# Patient Record
Sex: MALE | Race: WHITE | NOT HISPANIC OR LATINO | Employment: OTHER | URBAN - METROPOLITAN AREA
[De-identification: names, ages, dates, MRNs, and addresses within clinical notes are randomized per-mention and may not be internally consistent; named-entity substitution may affect disease eponyms.]

---

## 2017-11-17 ENCOUNTER — OFFICE VISIT (OUTPATIENT)
Dept: FAMILY MEDICINE | Facility: CLINIC | Age: 52
End: 2017-11-17
Payer: COMMERCIAL

## 2017-11-17 VITALS
OXYGEN SATURATION: 100 % | HEART RATE: 57 BPM | HEIGHT: 69 IN | BODY MASS INDEX: 25.3 KG/M2 | TEMPERATURE: 97.2 F | WEIGHT: 170.8 LBS | SYSTOLIC BLOOD PRESSURE: 136 MMHG | DIASTOLIC BLOOD PRESSURE: 72 MMHG

## 2017-11-17 DIAGNOSIS — L57.0 ACTINIC KERATOSIS: ICD-10-CM

## 2017-11-17 DIAGNOSIS — Z23 NEED FOR PROPHYLACTIC VACCINATION AND INOCULATION AGAINST INFLUENZA: ICD-10-CM

## 2017-11-17 DIAGNOSIS — I10 BENIGN ESSENTIAL HYPERTENSION: Primary | ICD-10-CM

## 2017-11-17 DIAGNOSIS — B35.1 ONYCHOMYCOSIS: ICD-10-CM

## 2017-11-17 LAB
ALBUMIN SERPL-MCNC: 4.1 G/DL (ref 3.4–5)
ALP SERPL-CCNC: 75 U/L (ref 40–150)
ALT SERPL W P-5'-P-CCNC: 35 U/L (ref 0–70)
ANION GAP SERPL CALCULATED.3IONS-SCNC: 7 MMOL/L (ref 3–14)
AST SERPL W P-5'-P-CCNC: 27 U/L (ref 0–45)
BILIRUB SERPL-MCNC: 0.6 MG/DL (ref 0.2–1.3)
BUN SERPL-MCNC: 23 MG/DL (ref 7–30)
CALCIUM SERPL-MCNC: 9.6 MG/DL (ref 8.5–10.1)
CHLORIDE SERPL-SCNC: 106 MMOL/L (ref 94–109)
CHOLEST SERPL-MCNC: 181 MG/DL
CO2 SERPL-SCNC: 27 MMOL/L (ref 20–32)
CREAT SERPL-MCNC: 0.86 MG/DL (ref 0.66–1.25)
GFR SERPL CREATININE-BSD FRML MDRD: >90 ML/MIN/1.7M2
GLUCOSE SERPL-MCNC: 80 MG/DL (ref 70–99)
HDLC SERPL-MCNC: 71 MG/DL
LDLC SERPL CALC-MCNC: 99 MG/DL
NONHDLC SERPL-MCNC: 110 MG/DL
POTASSIUM SERPL-SCNC: 3.9 MMOL/L (ref 3.4–5.3)
PROT SERPL-MCNC: 7.5 G/DL (ref 6.8–8.8)
SODIUM SERPL-SCNC: 140 MMOL/L (ref 133–144)
TRIGL SERPL-MCNC: 54 MG/DL

## 2017-11-17 PROCEDURE — 90471 IMMUNIZATION ADMIN: CPT | Performed by: FAMILY MEDICINE

## 2017-11-17 PROCEDURE — 80053 COMPREHEN METABOLIC PANEL: CPT | Performed by: FAMILY MEDICINE

## 2017-11-17 PROCEDURE — 17000 DESTRUCT PREMALG LESION: CPT | Performed by: FAMILY MEDICINE

## 2017-11-17 PROCEDURE — 90686 IIV4 VACC NO PRSV 0.5 ML IM: CPT | Performed by: FAMILY MEDICINE

## 2017-11-17 PROCEDURE — 80061 LIPID PANEL: CPT | Performed by: FAMILY MEDICINE

## 2017-11-17 PROCEDURE — 99203 OFFICE O/P NEW LOW 30 MIN: CPT | Mod: 25 | Performed by: FAMILY MEDICINE

## 2017-11-17 PROCEDURE — 36415 COLL VENOUS BLD VENIPUNCTURE: CPT | Performed by: FAMILY MEDICINE

## 2017-11-17 RX ORDER — TERBINAFINE HYDROCHLORIDE 250 MG/1
250 TABLET ORAL DAILY
Qty: 90 TABLET | Refills: 0 | Status: SHIPPED | OUTPATIENT
Start: 2017-11-17 | End: 2018-02-15

## 2017-11-17 RX ORDER — LISINOPRIL 10 MG/1
10 TABLET ORAL DAILY
Qty: 90 TABLET | Refills: 3 | Status: SHIPPED | OUTPATIENT
Start: 2017-11-17 | End: 2018-11-07

## 2017-11-17 NOTE — PROGRESS NOTES

## 2017-11-17 NOTE — PROGRESS NOTES
SUBJECTIVE:   Lane Bosch is a 52 year old male who presents to clinic today for the following health issues:      New Patient/Transfer of Care    Hypertension Follow-up      Outpatient blood pressures are being checked at store.  Results are 136 - 140 over 80 - 87.    Low Salt Diet: no added salt        Amount of exercise or physical activity: 4-5 days/week for an average of greater than 60 minutes    Problems taking medications regularly: has not taken any medication for 1 1/2 years    Medication side effects: NA    Diet: low salt and low carb, no processed carbs       Concern - patchy area on nose  Onset: 6 months    Description:   Gets flaky at times, rough    Intensity: mild    Progression of Symptoms:  same    Accompanying Signs & Symptoms:  none    Previous history of similar problem:   Yes on forehead    Precipitating factors:   Worsened by: picking at the area    Alleviating factors:  Improved by: none    Therapies Tried and outcome: none    Concern - toenail fungus  Onset: 1 year    Description:   Thick, discolored, crumbling    Intensity: mild    Progression of Symptoms:  same    Accompanying Signs & Symptoms:  none    Previous history of similar problem:   yes    Precipitating factors:   Worsened by: none    Alleviating factors:  Improved by: none    Therapies Tried and outcome: none  Treated with oral Lamisil in the past which worked well.    Problem list and histories reviewed & adjusted, as indicated.  Additional history: as documented    BP Readings from Last 3 Encounters:   11/17/17 136/72    Wt Readings from Last 3 Encounters:   11/17/17 170 lb 12.8 oz (77.5 kg)             Reviewed and updated as needed this visit by clinical staffTobacco  Allergies  Meds  Med Hx  Surg Hx  Fam Hx  Soc Hx      Reviewed and updated as needed this visit by Provider         ROS:  C: NEGATIVE for fever, chills, change in weight  R: NEGATIVE for significant cough or SOB  CV: NEGATIVE for chest pain,  "palpitations or peripheral edema    OBJECTIVE:     /72  Pulse 57  Temp 97.2  F (36.2  C) (Tympanic)  Ht 5' 8.75\" (1.746 m)  Wt 170 lb 12.8 oz (77.5 kg)  SpO2 100%  BMI 25.41 kg/m2  Body mass index is 25.41 kg/(m^2).  GENERAL: healthy, alert and no distress  RESP: lungs clear to auscultation - no rales, rhonchi or wheezes  CV: regular rate and rhythm, normal S1 S2, no S3 or S4, no murmur, click or rub, no peripheral edema and peripheral pulses strong  MS: no gross musculoskeletal defects noted, no edema  SKIN: toenails on right foot 2nd and 4th toenail yellowing from corners and some nail thickening  Left side of nose 5mm by 7mm area of scaly flaking skin.  The lesion was frozen to an ice ball of 3mm beyond the lesion and allowed to completely thaw and the freeze/thaw cycle was repeated two more times.          Diagnostic Test Results:  none     ASSESSMENT/PLAN:       Lane was seen today for establish care, derm problem, hypertension, toenail and flu shot.    Diagnoses and all orders for this visit:    Benign essential hypertension: borderline so again restart lisinopril 10mg daily  -recheck labs BMP in 4 weeks  -     lisinopril (PRINIVIL/ZESTRIL) 10 MG tablet; Take 1 tablet (10 mg) by mouth daily  -     Lipid panel reflex to direct LDL Fasting  -     Comprehensive metabolic panel  -     Comprehensive metabolic panel; Future    Actinic keratosis: on nose, discussed treatment options  -treatment with liquid nitrogen  -     DESTRUCT PREMALIGNANT LESION, FIRST    Onychomycosis: discussed treatment options  -plan lamasil  -liver function today and in 4-6 weeks.  --discussed risks, benefits, and side effects of this new medication. Patient understands and is in agreement.  -     terbinafine (LAMISIL) 250 MG tablet; Take 1 tablet (250 mg) by mouth daily  -     Comprehensive metabolic panel  -     Comprehensive metabolic panel; Future    Need for prophylactic vaccination and inoculation against influenza  -   "   FLU VAC, SPLIT VIRUS IM > 3 YO (QUADRIVALENT) [42467]  -     Vaccine Administration, Initial [14511]        Patient Instructions     1. To lab for blood work    Start the Lamisil for the nail fungus daily for 3 months, if toward the end of three months still seeing some fungus call clinic for one month refill.    Schedule a lab only visit in 4-6 weeks for recheck liver test  Starting the lamisil and electrolyte test after restarting the lisinopril    Froze the actinic keratosis today      Franc Mckeon MD  [facility]

## 2017-11-17 NOTE — PROGRESS NOTES
"  SUBJECTIVE:   Lane Bosch is a 52 year old male who presents to clinic today for the following health issues:    Establish Care - recent move to the area due to wife's job      Hypertension:   - had taken lisinopril for over 10 years - ran out about 1 1/2 years ago and did not refill   -Lisinopril: Was at the 20 mg dose most recently. No side effects with the Lisinopril in past.   -[facility]asional BP checks at [facility]   -Other medications: none  -Sxs: none noticed   -Diagnosis: First diagnosed at 23 yo   -FHx: Mother hx of Melanoma. Father hx of prostate and pancreatic cancer.   -Diet: No salt added. Low carb and fat. Water intake good.   -Exercise: Works out everyday at [employer]   -Drugs/ETOH: None. Non smoker.       Skin Changes:   - patchy area on left side of nose; x 6 months - patient concerned due to family hx of skin cancer (Mother)  -Denies changes. Flaky   -Saw derm 3 years ago. Biopsy taken of other lesion benign  -Fair skin.   -, reports not always using sunscreen     Right foot Onychomycosis   -fungus on right foot, 2nd and 5th toe; x 1 year   -Attributed to climbing a lot   -history of diagnosis treated with Lamisil in past orally   -No treatments tried       ROS:  C: NEGATIVE for fever, chills, change in weight  E/M: NEGATIVE for ear, mouth and throat problems  R: NEGATIVE for significant cough or SOB  CV: NEGATIVE for chest pain, palpitations or peripheral edema  : negative for dysuria, hematuria, decreased urinary stream, erectile dysfunction  MUSCULOSKELETAL: NEGATIVE for significant arthralgias or myalgia  NEURO: NEGATIVE for weakness, dizziness or paresthesias  ENDOCRINE: NEGATIVE for temperature intolerance, skin/hair changes  HEME/ALLERGY/IMMUNE: NEGATIVE for bleeding problems  PSYCHIATRIC: NEGATIVE for changes in mood or affect  ROS otherwise negative    OBJECTIVE:   /72  Pulse 57  Temp 97.2  F (36.2  C) (Tympanic)  Ht 5' 8.75\" (1.746 m)  Wt 170 lb 12.8 oz (77.5 kg)  SpO2 100%  " BMI 25.41 kg/m2  Body mass index is 25.41 kg/(m^2).  There were no vitals taken for this visit.  There is no height or weight on file to calculate BMI.     GENERAL: healthy, alert and no distress  EYES: Eyes grossly normal to inspection, PERRL and conjunctivae and sclerae normal  NECK: no adenopathy, no asymmetry, masses, or scars and thyroid normal to palpation  RESP: lungs clear to auscultation - no rales, rhonchi or wheezes  CV: regular rate and rhythm, normal S1 S2, no S3 or S4, no murmur, click or rub, no peripheral edema and peripheral pulses strong  MS: no gross musculoskeletal defects noted, no edema  SKIN:   Nose: Dry, flaky, 5 mm Actinic Keratosis on the left lateral aspect of the nose   Toenail: Nail thickening and yellowing of the 2nd and 5th digits of the right foot   PSYCH: mentation appears normal, affect normal/bright    Diagnostic Test Results:  none     ASSESSMENT/PLAN:   Lane Bosch is a 53yo male who presented today to [facility] care and the following diagnoses:    Benign essential hypertension  (primary encounter diagnosis), persistent   -30 year history of HTN most recently treated with 20 mg Lisinopril without symptoms.   -BP today 136/72 without medication for past 1.5 years so decided with pt to re-start Lisinopril at 10 mg dose   -Check labs for cholesterol, electrolytes, blood sugar and liver functions for medication initiation    Plan: lisinopril (PRINIVIL/ZESTRIL) 10 MG tablet,         Lipid panel reflex to direct LDL Fasting,         Comprehensive metabolic panel, Comprehensive         metabolic panel    Actinic keratosis  Comment: Clinical Diagnosis made on exam and with history of AK in past  Plan: Cryotherapy performed today  -Educated on normal blister to form with freezing but lesion should resolve.     Onychomycosis  -History of diagnosis in past with the same Lamisil treatment that resolved symptoms before.   -Liver functions to be performed before the start of medication and  mid way point of treatment to monitor for med side effects   Plan: terbinafine (LAMISIL) 250 MG tablet,         Comprehensive metabolic panel, Comprehensive         metabolic panel        FUTURE APPOINTMENTS:       - Schedule fasting labs in the next month, will call with results. If skin lesions persist or worsen. See pt handout.         Pt understands and is in agreement to plan.     JOSE FrancoS

## 2017-11-17 NOTE — NURSING NOTE
"Chief Complaint   Patient presents with     Establish Care     previously seen at clinic in [place], [place]      Derm Problem     patchy area on left side of nose; x 6 months - patient concerned due to family hx of skin cancer     Hypertension     elevated b/p - had taken lisinopril for over 10 years - ran out about 1 1/2 years ago and did not refill     Toenail     fungus on right foot, 2nd and 5th toe; x 1 year       Initial /72  Pulse 57  Temp 97.2  F (36.2  C) (Tympanic)  Ht 5' 8.75\" (1.746 m)  Wt 170 lb 12.8 oz (77.5 kg)  SpO2 100%  BMI 25.41 kg/m2 Estimated body mass index is 25.41 kg/(m^2) as calculated from the following:    Height as of this encounter: 5' 8.75\" (1.746 m).    Weight as of this encounter: 170 lb 12.8 oz (77.5 kg).  Medication Reconciliation: complete  "

## 2017-11-17 NOTE — PATIENT INSTRUCTIONS
1. To lab for blood work    Start the Lamisil for the nail fungus daily for 3 months, if toward the end of three months still seeing some fungus call clinic for one month refill.    Schedule a lab only visit in 4-6 weeks for recheck liver test  Starting the lamisil and electrolyte test after restarting the lisinopril    Froze the actinic keratosis today      Thank you for choosing [facility].  You may be receiving a survey in the mail from [employer] regarding your visit today.  Please take a few minutes to complete and return the survey to let us know how we are doing.      If you have questions or concerns, please contact us via [employer] or you can contact your care team at [phone].    Our Clinic hours are:  Monday 6:40 am  to 7:00 pm  Tuesday -Friday 6:40 am to 5:00 pm    The [place] outpatient lab hours are:  Monday - Friday 6:10 am to 4:45 pm  Saturdays 7:00 am to 11:00 am  Appointments are required, call [phone]    If you have clinical questions after hours or would like to schedule an appointment,  call the clinic at [phone].

## 2017-11-17 NOTE — MR AVS SNAPSHOT
After Visit Summary   11/17/2017    Lane Bosch    MRN: [id number]           Patient Information     Date Of Birth          1965        Visit Information        Provider Department      11/17/2017 1:20 PM Franc Mckeon MD [facility]        Today's Diagnoses     Benign essential hypertension    -  1    Actinic keratosis        Onychomycosis          Care Instructions      1. To lab for blood work    Start the Lamisil for the nail fungus daily for 3 months, if toward the end of three months still seeing some fungus call clinic for one month refill.    Schedule a lab only visit in 4-6 weeks for recheck liver test  Starting the lamisil and electrolyte test after restarting the lisinopril    Froze the actinic keratosis today      Thank you for choosing [facility].  You may be receiving a survey in the mail from [employer] regarding your visit today.  Please take a few minutes to complete and return the survey to let us know how we are doing.      If you have questions or concerns, please contact us via [employer] or you can contact your care team at [phone].    Our Clinic hours are:  Monday 6:40 am  to 7:00 pm  Tuesday -Friday 6:40 am to 5:00 pm    The [place] outpatient lab hours are:  Monday - Friday 6:10 am to 4:45 pm  Saturdays 7:00 am to 11:00 am  Appointments are required, call [phone]    If you have clinical questions after hours or would like to schedule an appointment,  call the clinic at [phone].          Follow-ups after your visit        Future tests that were ordered for you today     Open Future Orders        Priority Expected Expires Ordered    Comprehensive metabolic panel Routine  2/18/2018 11/17/2017            Who to contact     If you have questions or need follow up information about today's clinic visit or your schedule please contact [facility] directly at [phone].  Normal or non-critical lab and imaging results will be  "communicated to you by MyChart, letter or phone within 4 business days after the clinic has received the results. If you do not hear from us within 7 days, please contact the clinic through [employer] or phone. If you have a critical or abnormal lab result, we will notify you by phone as soon as possible.  Submit refill requests through [employer] or call your pharmacy and they will forward the refill request to us. Please allow 3 business days for your refill to be completed.          Additional Information About Your Visit        [employer] Information     [employer] lets you send messages to your doctor, view your test results, renew your prescriptions, schedule appointments and more. To sign up, go to [email or web address] . Click on \"Log in\" on the left side of the screen, which will take you to the Welcome page. Then click on \"Sign up Now\" on the right side of the page.     You will be asked to enter the access code listed below, as well as some personal information. Please follow the directions to create your username and password.     Your access code is: [email or web address]  Expires: 2/15/2018  2:23 PM     Your access code will  in 90 days. If you need help or a new code, please call your [place] clinic or [phone].        Care EveryWhere ID     This is your Care EveryWhere ID. This could be used by other organizations to access your [place] medical records  [id number]        Your Vitals Were     Pulse Temperature Height Pulse Oximetry BMI (Body Mass Index)       57 97.2  F (36.2  C) (Tympanic) 5' 8.75\" (1.746 m) 100% 25.41 kg/m2        Blood Pressure from Last 3 Encounters:   17 136/72    Weight from Last 3 Encounters:   17 170 lb 12.8 oz (77.5 kg)              We Performed the Following     Comprehensive metabolic panel     Lipid panel reflex to direct LDL Fasting          Today's Medication Changes          These changes are accurate as of: 17  2:23 PM.  If you have any questions, ask your " nurse or doctor.               Start taking these medicines.        Dose/Directions    lisinopril 10 MG tablet   Commonly known as:  PRINIVIL/ZESTRIL   Used for:  Benign essential hypertension   Started by:  Franc Mckeon MD        Dose:  10 mg   Take 1 tablet (10 mg) by mouth daily   Quantity:  90 tablet   Refills:  3       terbinafine 250 MG tablet   Commonly known as:  lamISIL   Used for:  Onychomycosis   Started by:  Franc Mckeon MD        Dose:  250 mg   Take 1 tablet (250 mg) by mouth daily   Quantity:  90 tablet   Refills:  0            Where to get your medicines      These medications were sent to [email or web address] - [facility] [address] [facility] [address] [facility] OF [address] [place] [address]  [address] [facility] [address]     Phone:  [phone]     lisinopril 10 MG tablet    terbinafine 250 MG tablet                Primary Care Provider Office Phone # Fax #    Franc Mckeon [phone] [address] [facility] [address]        Equal Access to Services     MELISSA HASSAN : Hadii gia ku hadasho Soomaali, waaxda luqadaha, qaybta kaalmada adeegyada, merle benoit . So [facility] [phone].    ATENCIÓN: Si habla español, tiene a mccann disposición servicios gratuitos de asistencia lingüística. Pantera al [phone].    We comply with applicable federal civil rights laws and [place] laws. We do not discriminate on the basis of race, color, national origin, age, disability, sex, sexual orientation, or gender identity.            Thank you!     Thank you for choosing [facility]  for your care. Our goal is always to provide you with excellent care. Hearing back from our patients is one way we can continue to improve our services. Please take a few minutes to complete the written survey that you may receive in the mail after your visit with us. Thank you!             Your Updated Medication List - Protect others around  you: Learn how to safely use, store and throw away your medicines at www.disposemymeds.org.          This list is accurate as of: 11/17/17  2:23 PM.  Always use your most recent med list.                   Brand Name Dispense Instructions for use Diagnosis    lisinopril 10 MG tablet    PRINIVIL/ZESTRIL    90 tablet    Take 1 tablet (10 mg) by mouth daily    Benign essential hypertension       terbinafine 250 MG tablet    lamISIL    90 tablet    Take 1 tablet (250 mg) by mouth daily    Onychomycosis

## 2018-02-15 DIAGNOSIS — B35.1 ONYCHOMYCOSIS: ICD-10-CM

## 2018-02-15 NOTE — TELEPHONE ENCOUNTER
Requested Prescriptions   Pending Prescriptions Disp Refills     terbinafine (LAMISIL) 250 MG tablet [Pharmacy Med Name: TERBINAFINE 250MG  Last Written Prescription Date:  11/17/17  Last Fill Quantity: 90,  # refills: 0   Last office visit: 11/17/2017 with prescribing provider:  11/17/17   Future Office Visit:     TABLETS] 90 tablet 0     Sig: TAKE ONE TABLET BY MOUTH DAILY    There is no refill protocol information for this order

## 2018-02-19 NOTE — TELEPHONE ENCOUNTER
Routing refill request to provider for review/approval because:  Drug not on the [facility] refill protocol     María Hughes RN

## 2018-02-20 RX ORDER — TERBINAFINE HYDROCHLORIDE 250 MG/1
TABLET ORAL
Qty: 90 TABLET | Refills: 0 | Status: SHIPPED | OUTPATIENT
Start: 2018-02-20 | End: 2018-05-22

## 2018-11-07 DIAGNOSIS — I10 BENIGN ESSENTIAL HYPERTENSION: ICD-10-CM

## 2018-11-07 RX ORDER — LISINOPRIL 10 MG/1
TABLET ORAL
Qty: 30 TABLET | Refills: 0 | Status: SHIPPED | OUTPATIENT
Start: 2018-11-07 | End: 2018-11-16

## 2018-11-07 NOTE — TELEPHONE ENCOUNTER
"Medication is being filled for 1 time refill only due to:  Patient needs labs .. Patient needs to be seen because last OV 11/17/17. Called pt and scheduled for 11/16/18 with Dr. Mckeon.     Requested Prescriptions   Pending Prescriptions Disp Refills     lisinopril (PRINIVIL/ZESTRIL) 10 MG tablet [Pharmacy Med Name: LISINOPRIL 10MG TABLETS] 90 tablet 0     Sig: TAKE ONE TABLET BY MOUTH DAILY    ACE Inhibitors (Including Combos) Protocol Passed    11/7/2018  4:57 PM       Passed - Blood pressure under 140/90 in past 12 months    BP Readings from Last 3 Encounters:   11/17/17 136/72                Passed - Recent (12 mo) or future (30 days) visit within the authorizing provider's specialty    Patient had office visit in the last 12 months or has a visit in the next 30 days with authorizing provider or within the authorizing provider's specialty.  See \"Patient Info\" tab in inbasket, or \"Choose Columns\" in Meds & Orders section of the refill encounter.             Passed - Patient is age 18 or older       Passed - Normal serum creatinine on file in past 12 months    Recent Labs   Lab Test  11/17/17   1426   CR  0.86            Passed - Normal serum potassium on file in past 12 months    Recent Labs   Lab Test  11/17/17   1426   POTASSIUM  3.9             Last Written Prescription Date:  11/17/17  Last Fill Quantity: 90,  # refills: 3   Last office visit: 11/17/2017 with prescribing provider:  Dr. Mckeon   Future Office Visit:      Constance HARRSION RN        "

## 2018-11-16 ENCOUNTER — OFFICE VISIT (OUTPATIENT)
Dept: FAMILY MEDICINE | Facility: CLINIC | Age: 53
End: 2018-11-16
Payer: COMMERCIAL

## 2018-11-16 VITALS
DIASTOLIC BLOOD PRESSURE: 70 MMHG | WEIGHT: 166.2 LBS | SYSTOLIC BLOOD PRESSURE: 124 MMHG | BODY MASS INDEX: 24.62 KG/M2 | HEIGHT: 69 IN | OXYGEN SATURATION: 98 % | TEMPERATURE: 96.2 F | HEART RATE: 50 BPM

## 2018-11-16 DIAGNOSIS — Z00.00 ROUTINE GENERAL MEDICAL EXAMINATION AT A HEALTH CARE FACILITY: Primary | ICD-10-CM

## 2018-11-16 DIAGNOSIS — Z23 NEED FOR PROPHYLACTIC VACCINATION AND INOCULATION AGAINST INFLUENZA: ICD-10-CM

## 2018-11-16 DIAGNOSIS — N28.89 URETEROCELE: ICD-10-CM

## 2018-11-16 DIAGNOSIS — Z71.89 ADVANCED DIRECTIVES, COUNSELING/DISCUSSION: ICD-10-CM

## 2018-11-16 DIAGNOSIS — I10 BENIGN ESSENTIAL HYPERTENSION: ICD-10-CM

## 2018-11-16 DIAGNOSIS — Z12.5 SCREENING FOR PROSTATE CANCER: ICD-10-CM

## 2018-11-16 LAB
ANION GAP SERPL CALCULATED.3IONS-SCNC: 6 MMOL/L (ref 3–14)
BUN SERPL-MCNC: 15 MG/DL (ref 7–30)
CALCIUM SERPL-MCNC: 9.3 MG/DL (ref 8.5–10.1)
CHLORIDE SERPL-SCNC: 103 MMOL/L (ref 94–109)
CO2 SERPL-SCNC: 28 MMOL/L (ref 20–32)
CREAT SERPL-MCNC: 0.77 MG/DL (ref 0.66–1.25)
GFR SERPL CREATININE-BSD FRML MDRD: >90 ML/MIN/1.7M2
GLUCOSE SERPL-MCNC: 76 MG/DL (ref 70–99)
POTASSIUM SERPL-SCNC: 3.8 MMOL/L (ref 3.4–5.3)
PSA SERPL-ACNC: 1.76 UG/L (ref 0–4)
SODIUM SERPL-SCNC: 137 MMOL/L (ref 133–144)

## 2018-11-16 PROCEDURE — 36415 COLL VENOUS BLD VENIPUNCTURE: CPT | Performed by: FAMILY MEDICINE

## 2018-11-16 PROCEDURE — G0103 PSA SCREENING: HCPCS | Performed by: FAMILY MEDICINE

## 2018-11-16 PROCEDURE — 99396 PREV VISIT EST AGE 40-64: CPT | Mod: 25 | Performed by: FAMILY MEDICINE

## 2018-11-16 PROCEDURE — 90686 IIV4 VACC NO PRSV 0.5 ML IM: CPT | Performed by: FAMILY MEDICINE

## 2018-11-16 PROCEDURE — 80048 BASIC METABOLIC PNL TOTAL CA: CPT | Performed by: FAMILY MEDICINE

## 2018-11-16 PROCEDURE — 90471 IMMUNIZATION ADMIN: CPT | Performed by: FAMILY MEDICINE

## 2018-11-16 RX ORDER — LISINOPRIL 10 MG/1
10 TABLET ORAL DAILY
Qty: 90 TABLET | Refills: 3 | Status: SHIPPED | OUTPATIENT
Start: 2018-11-16 | End: 2019-12-31

## 2018-11-16 NOTE — PATIENT INSTRUCTIONS
1. To lab for blood work    You will call to schedule follow up with Urologist      Thank you for choosing [facility].  You may be receiving a survey in the mail from [employer] regarding your visit today.  Please take a few minutes to complete and return the survey to let us know how we are doing.      If you have questions or concerns, please contact us via [employer] or you can contact your care team at [phone].    Our Clinic hours are:  Monday 6:40 am  to 7:00 pm  Tuesday -Friday 6:40 am to 5:00 pm    The [place] outpatient lab hours are:  Monday - Friday 6:10 am to 4:45 pm  Saturdays 7:00 am to 11:00 am  Appointments are required, call [phone]    If you have clinical questions after hours or would like to schedule an appointment,  call the clinic at [phone].    Preventive Health Recommendations  Male Ages 50 - 64    Yearly exam:             See your health care provider every year in order to  o   Review health changes.   o   Discuss preventive care.    o   Review your medicines if your doctor has prescribed any.     Have a cholesterol test every 5 years, or more frequently if you are at risk for high cholesterol/heart disease.     Have a diabetes test (fasting glucose) every three years. If you are at risk for diabetes, you should have this test more often.     Have a colonoscopy at age 50, or have a yearly FIT test (stool test). These exams will check for colon cancer.      Talk with your health care provider about whether or not a prostate cancer screening test (PSA) is right for you.    You should be tested each year for STDs (sexually transmitted diseases), if you re at risk.     Shots: Get a flu shot each year. Get a tetanus shot every 10 years.     Nutrition:    Eat at least 5 servings of fruits and vegetables daily.     Eat whole-grain bread, whole-wheat pasta and brown rice instead of white grains and rice.     Get adequate Calcium and Vitamin D.     Lifestyle    Exercise for at  least 150 minutes a week (30 minutes a day, 5 days a week). This will help you control your weight and prevent disease.     Limit alcohol to one drink per day.     No smoking.     Wear sunscreen to prevent skin cancer.     See your dentist every six months for an exam and cleaning.     See your eye doctor every 1 to 2 years.

## 2018-11-16 NOTE — PROGRESS NOTES
SUBJECTIVE:   CC: Lane Bosch is an 53 year old male who presents for preventative health visit.     Healthy Habits:    Do you get at least three servings of calcium containing foods daily (dairy, green leafy vegetables, etc.)? yes    Amount of exercise or daily activities, outside of work: 7 day(s) per week    Problems taking medications regularly No    Medication side effects: No    Have you had an eye exam in the past two years? no    Do you see a dentist twice per year? yes    Do you have sleep apnea, excessive snoring or daytime drowsiness?no       PROBLEMS TO ADD ON...  None          Today's PHQ-2 Score:   PHQ-2 ( 1999 Pfizer) 11/16/2018 11/17/2017   Q1: Little interest or pleasure in doing things 0 0   Q2: Feeling down, depressed or hopeless 0 0   PHQ-2 Score 0 0       Abuse: Current or Past(Physical, Sexual or Emotional)- No  Do you feel safe in your environment - Yes    Social History   Substance Use Topics     Smoking status: Never Smoker     Smokeless tobacco: Never Used     Alcohol use No      If you drink alcohol do you typically have >3 drinks per day or >7 drinks per week? No                      Last PSA: No results found for: PSA    Reviewed orders with patient. Reviewed health maintenance and updated orders accordingly - Yes  BP Readings from Last 3 Encounters:   11/16/18 124/70   11/17/17 136/72    Wt Readings from Last 3 Encounters:   11/16/18 166 lb 3.2 oz (75.4 kg)   11/17/17 170 lb 12.8 oz (77.5 kg)                    Reviewed and updated as needed this visit by clinical staff  Tobacco  Allergies  Meds  Med Hx  Surg Hx  Fam Hx  Soc Hx        Reviewed and updated as needed this visit by Provider            ROS:  CONSTITUTIONAL: NEGATIVE for fever, chills, change in weight  INTEGUMENTARY/SKIN: NEGATIVE for worrisome rashes, moles or lesions  EYES: NEGATIVE for vision changes or irritation  ENT: NEGATIVE for ear, mouth and throat problems  RESP: NEGATIVE for significant cough or  "SOB  CV: NEGATIVE for chest pain, palpitations or peripheral edema  GI: NEGATIVE for nausea, abdominal pain, heartburn, or change in bowel habits   male: negative for dysuria, hematuria, decreased urinary stream, erectile dysfunction, urethral discharge  MUSCULOSKELETAL: NEGATIVE for significant arthralgias or myalgia  NEURO: NEGATIVE for weakness, dizziness or paresthesias  PSYCHIATRIC: NEGATIVE for changes in mood or affect    OBJECTIVE:   /70  Pulse 50  Temp 96.2  F (35.7  C) (Tympanic)  Ht 5' 8.75\" (1.746 m)  Wt 166 lb 3.2 oz (75.4 kg)  SpO2 98%  BMI 24.72 kg/m2  EXAM:  GENERAL: healthy, alert and no distress  EYES: Eyes grossly normal to inspection, PERRL and conjunctivae and sclerae normal  HENT: ear canals and TM's normal, nose and mouth without ulcers or lesions  NECK: no adenopathy, no asymmetry, masses, or scars and thyroid normal to palpation  RESP: lungs clear to auscultation - no rales, rhonchi or wheezes  CV: regular rate and rhythm, normal S1 S2, no S3 or S4, no murmur, click or rub, no peripheral edema and peripheral pulses strong  ABDOMEN: soft, nontender, no hepatosplenomegaly, no masses and bowel sounds normal  MS: no gross musculoskeletal defects noted, no edema  SKIN: no suspicious lesions or rashes  NEURO: Normal strength and tone, mentation intact and speech normal  PSYCH: mentation appears normal, affect normal/bright    Diagnostic Test Results:  none     ASSESSMENT/PLAN:   Lane was seen today for physical and flu shot.    Diagnoses and all orders for this visit:    Routine general medical examination at a health care facility    Advanced directives, counseling/discussion    Benign essential hypertension: well controlled, refill  -     lisinopril (PRINIVIL/ZESTRIL) 10 MG tablet; Take 1 tablet (10 mg) by mouth daily  -     Basic metabolic panel    Ureterocele: referral for follow up ureterocele repair.  -     UROLOGY ADULT REFERRAL    Screening for prostate cancer  -     PSA, " "screen        COUNSELING:  Reviewed preventive health counseling, as reflected in patient instructions       Regular exercise       Healthy diet/nutrition       Vision screening       Prostate cancer screening    BP Readings from Last 1 Encounters:   11/16/18 124/70     Estimated body mass index is 24.72 kg/(m^2) as calculated from the following:    Height as of this encounter: 5' 8.75\" (1.746 m).    Weight as of this encounter: 166 lb 3.2 oz (75.4 kg).           reports that he has never smoked. He has never used smokeless tobacco.      Counseling Resources:  ATP IV Guidelines  Pooled Cohorts Equation Calculator  FRAX Risk Assessment  ICSI Preventive Guidelines  Dietary Guidelines for Americans, 2010  USDA's MyPlate  ASA Prophylaxis  Lung CA Screening    Franc Mckeon MD  [facility]  "

## 2018-11-16 NOTE — MR AVS SNAPSHOT
After Visit Summary   11/16/2018    Lane Bosch    MRN: [id number]           Patient Information     Date Of Birth          1965        Visit Information        Provider Department      11/16/2018 1:20 PM Franc Mckeon MD [facility]        Today's Diagnoses     Routine general medical examination at a health care facility    -  1    Advanced directives, counseling/discussion        Benign essential hypertension        Ureterocele        Screening for prostate cancer          Care Instructions    1. To lab for blood work    You will call to schedule follow up with Urologist      Thank you for choosing [facility].  You may be receiving a survey in the mail from [employer] regarding your visit today.  Please take a few minutes to complete and return the survey to let us know how we are doing.      If you have questions or concerns, please contact us via [employer] or you can contact your care team at [phone].    Our Clinic hours are:  Monday 6:40 am  to 7:00 pm  Tuesday -Friday 6:40 am to 5:00 pm    The [place] outpatient lab hours are:  Monday - Friday 6:10 am to 4:45 pm  Saturdays 7:00 am to 11:00 am  Appointments are required, call [phone]    If you have clinical questions after hours or would like to schedule an appointment,  call the clinic at [phone].    Preventive Health Recommendations  Male Ages 50 - 64    Yearly exam:             See your health care provider every year in order to  o   Review health changes.   o   Discuss preventive care.    o   Review your medicines if your doctor has prescribed any.     Have a cholesterol test every 5 years, or more frequently if you are at risk for high cholesterol/heart disease.     Have a diabetes test (fasting glucose) every three years. If you are at risk for diabetes, you should have this test more often.     Have a colonoscopy at age 50, or have a yearly FIT test (stool test). These exams will check for  colon cancer.      Talk with your health care provider about whether or not a prostate cancer screening test (PSA) is right for you.    You should be tested each year for STDs (sexually transmitted diseases), if you re at risk.     Shots: Get a flu shot each year. Get a tetanus shot every 10 years.     Nutrition:    Eat at least 5 servings of fruits and vegetables daily.     Eat whole-grain bread, whole-wheat pasta and brown rice instead of white grains and rice.     Get adequate Calcium and Vitamin D.     Lifestyle    Exercise for at least 150 minutes a week (30 minutes a day, 5 days a week). This will help you control your weight and prevent disease.     Limit alcohol to one drink per day.     No smoking.     Wear sunscreen to prevent skin cancer.     See your dentist every six months for an exam and cleaning.     See your eye doctor every 1 to 2 years.            Follow-ups after your visit        Additional Services     UROLOGY ADULT REFERRAL       Your provider has referred you to: OTHER PROVIDERS:  Patient has specialist urologist name from prior urology for referral.    Please be aware that coverage of these services is subject to the terms and limitations of your health insurance plan.  Call member services at your health plan with any benefit or coverage questions.      Please bring the following with you to your appointment:    (1) Any X-Rays, CTs or MRIs which have been performed.  Contact the facility where they were done to arrange for  prior to your scheduled appointment.    (2) List of current medications  (3) This referral request   (4) Any documents/labs given to you for this referral                  Who to contact     If you have questions or need follow up information about today's clinic visit or your schedule please contact [facility] directly at [phone].  Normal or non-critical lab and imaging results will be communicated to you by MyChart, letter or phone within 4  "business days after the clinic has received the results. If you do not hear from us within 7 days, please contact the clinic through [employer] or phone. If you have a critical or abnormal lab result, we will notify you by phone as soon as possible.  Submit refill requests through [employer] or call your pharmacy and they will forward the refill request to us. Please allow 3 business days for your refill to be completed.          Additional Information About Your Visit        [employer] Information     [employer] gives you secure access to your electronic health record. If you see a primary care provider, you can also send messages to your care team and make appointments. If you have questions, please call your primary care clinic.  If you do not have a primary care provider, please call [phone] and they will assist you.        Care EveryWhere ID     This is your Care EveryWhere ID. This could be used by other organizations to access your [place] medical records  [id number]        Your Vitals Were     Pulse Temperature Height Pulse Oximetry BMI (Body Mass Index)       50 96.2  F (35.7  C) (Tympanic) 5' 8.75\" (1.746 m) 98% 24.72 kg/m2        Blood Pressure from Last 3 Encounters:   11/16/18 124/70   11/17/17 136/72    Weight from Last 3 Encounters:   11/16/18 166 lb 3.2 oz (75.4 kg)   11/17/17 170 lb 12.8 oz (77.5 kg)              We Performed the Following     Basic metabolic panel     PSA, screen     UROLOGY ADULT REFERRAL          Today's Medication Changes          These changes are accurate as of 11/16/18  2:08 PM.  If you have any questions, ask your nurse or doctor.               These medicines have changed or have updated prescriptions.        Dose/Directions    lisinopril 10 MG tablet   Commonly known as:  PRINIVIL/ZESTRIL   This may have changed:  See the new instructions.   Used for:  Benign essential hypertension   Changed by:  Franc Mckeon MD        Dose:  10 mg   Take 1 tablet (10 mg) by mouth " daily   Quantity:  90 tablet   Refills:  3            Where to get your medicines      These medications were sent to [facility] - [place], [place] - [address] AT [address] [facility] [address]  [address], [place] [address]     Phone:  [phone]     lisinopril 10 MG tablet                Primary Care Provider Office Phone # Fax #    Franc Mckeon [phone] [address] [address]        Equal Access to Services     GISEL HASSAN : Hadii aad ku hadasho Soomaali, waaxda luqadaha, qaybta kaalmada adeegyada, waxay arielin haysg benoit . So [facility] [phone].    ATENCIÓN: Si habla español, tiene a mccann disposición servicios gratuitos de asistencia lingüística. [facility] [phone].    We comply with applicable federal civil rights laws and [place] laws. We do not discriminate on the basis of race, color, national origin, age, disability, sex, sexual orientation, or gender identity.            Thank you!     Thank you for choosing [facility]  for your care. Our goal is always to provide you with excellent care. Hearing back from our patients is one way we can continue to improve our services. Please take a few minutes to complete the written survey that you may receive in the mail after your visit with us. Thank you!             Your Updated Medication List - Protect others around you: Learn how to safely use, store and throw away your medicines at www.disposemymeds.org.          This list is accurate as of 11/16/18  2:08 PM.  Always use your most recent med list.                   Brand Name Dispense Instructions for use Diagnosis    lisinopril 10 MG tablet    PRINIVIL/ZESTRIL    90 tablet    Take 1 tablet (10 mg) by mouth daily    Benign essential hypertension       terbinafine 250 MG tablet    lamISIL    90 tablet    TAKE ONE TABLET BY MOUTH DAILY    Onychomycosis

## 2019-02-06 ENCOUNTER — TELEPHONE (OUTPATIENT)
Dept: FAMILY MEDICINE | Facility: CLINIC | Age: 54
End: 2019-02-06

## 2019-02-06 NOTE — LETTER
[facility]  [address]  [facility] [place], [place] [address]  Phone: [phone]  February 6, 2019      Lane Bosch  [address] [address]      Dear Lane,    We care about your health and have reviewed your health plan including your medical conditions, medications, and lab results.  Based on this review, it is recommended that you follow up regarding the following health topic(s):  -Colon Cancer Screening    We recommend you take the following action(s):  -schedule a COLONOSCOPY to look for colon cancer (due every 10 years or 5 years in higher risk situations.)  Colonoscopies can prevent 90-95% of colon cancer deaths.  Problem lesions can be removed before they ever become cancer.  If you do not wish to do a colonoscopy or cannot afford to do one at this time, there is another option called a Fecal Immunochemical Occult Blood Test (FIT) a take home stool sample kit.  It does not replace the colonoscopy for colorectal cancer screening, but it can detect hidden bleeding in the lower colon.  It does need to be repeated every year and if a positive result is obtained, you would be referred for a colonoscopy.  If you have completed either one of these tests at another facility, please have the records sent to our clinic for our records.     Please call us at the [facility] [phone] (or use [employer]) to address the above recommendations.     Thank you for trusting [facility] and we appreciate the opportunity to serve you.  We look forward to supporting your healthcare needs in the future.    Healthy Regards,    Your Health Care Team  [facility]

## 2019-02-06 NOTE — TELEPHONE ENCOUNTER
Panel Management Review      Patient has the following on his problem list:     Hypertension   Last three blood pressure readings:  BP Readings from Last 3 Encounters:   11/16/18 124/70   11/17/17 136/72     Blood pressure: Passed    HTN Guidelines:  Age 18-59 BP range:  Less than 140/90  Age 60-85 with Diabetes:  Less than 140/90  Age 60-85 without Diabetes:  less than 150/90      Composite cancer screening  Chart review shows that this patient is due/due soon for the following Colonoscopy and Fecal Colorectal (FIT)  Summary:    Patient is due/failing the following:   COLONOSCOPY and FIT    Action needed:   Patient needs referral/order: Colonoscopy and Fit test. When patient calls please see which test patient would like and put order in.    Type of outreach:    Sent letter.    Questions for provider review:    None                                                                                                                                    Jazlyn Pereira MA       Chart routed to None .

## 2019-07-19 ENCOUNTER — ANCILLARY PROCEDURE (OUTPATIENT)
Dept: GENERAL RADIOLOGY | Facility: CLINIC | Age: 54
End: 2019-07-19
Attending: FAMILY MEDICINE
Payer: COMMERCIAL

## 2019-07-19 ENCOUNTER — OFFICE VISIT (OUTPATIENT)
Dept: ORTHOPEDICS | Facility: CLINIC | Age: 54
End: 2019-07-19
Payer: COMMERCIAL

## 2019-07-19 VITALS
HEIGHT: 69 IN | SYSTOLIC BLOOD PRESSURE: 172 MMHG | BODY MASS INDEX: 19.26 KG/M2 | WEIGHT: 130 LBS | DIASTOLIC BLOOD PRESSURE: 98 MMHG

## 2019-07-19 DIAGNOSIS — M25.511 PAIN IN JOINT OF RIGHT SHOULDER: ICD-10-CM

## 2019-07-19 DIAGNOSIS — M25.571 ACUTE RIGHT ANKLE PAIN: ICD-10-CM

## 2019-07-19 DIAGNOSIS — M25.511 RIGHT SHOULDER PAIN, UNSPECIFIED CHRONICITY: ICD-10-CM

## 2019-07-19 DIAGNOSIS — M76.822 POSTERIOR TIBIAL TENDINITIS OF LEFT LEG: Primary | ICD-10-CM

## 2019-07-19 DIAGNOSIS — M25.571 RIGHT ANKLE PAIN: ICD-10-CM

## 2019-07-19 PROCEDURE — 73610 X-RAY EXAM OF ANKLE: CPT | Mod: RT

## 2019-07-19 PROCEDURE — 99214 OFFICE O/P EST MOD 30 MIN: CPT | Performed by: FAMILY MEDICINE

## 2019-07-19 PROCEDURE — 73030 X-RAY EXAM OF SHOULDER: CPT | Mod: RT

## 2019-07-19 ASSESSMENT — MIFFLIN-ST. JEOR: SCORE: 1406.09

## 2019-07-19 NOTE — LETTER
2019         RE: Lane Bosch  [address] [address]        Dear Colleague,    Thank you for referring your patient, Lane Bosch, to the [facility] [place]. Please see a copy of my visit note below.    Lane Bosch  :  1965  DOS: 2019  MRN: [id number]    Sports Medicine Clinic Visit    PCP: Franc Mckeon    Lane Bosch is a 54 year old male who is seen as a self referral presenting with right ankle pain and right shoulder.    Injury: 17 for right ankle, no nancy, pain is at his posterior deltoid ligaments.  Pain in the shoulder started over 10 years ago, unspecified anterior superior aspect of right shoulder. Pain located over right ankle and right shoulder, nonradiating.Additional Features:  Positive: Right Ankle: swelling and instability, Right Shoulder: nothing.  Symptoms are better with Nothing for the right ankle, resting feels better for the right shoulder.  Symptoms are worse with: flexion, running and stairs in the morning for the right ankle, overhead motions and overuse causes pain the next day for the right shoulder.  Other evaluation and/or treatments so far consists of: Ice, Heat, Ibuprofen, Rest and Elevation for both shoulder and ankle. Prior imaging: No recent imaging completed.  Prior History of related problems: no prior hx for both ankle and right shoulder.   He takes lisinopril for his blood pressure - he did not take that this am, in the pm is when it is taken    Social History: He is retired, enjoys trail running, swimming, biking.    Review of Systems  Musculoskeletal: as above  Remainder of review of systems is negative including constitutional, CV, pulmonary, GI, Skin and Neurologic except as noted in HPI or medical history.    Past Medical History:   Diagnosis Date     Hypertension      Past Surgical History:   Procedure Laterality Date     C CYSTOTOMY,REPAIR URETEROCELE  2015     HEMILAMINECTOMY, DISCECTOMY  "LUMBAR ONE LEVEL, COMBINED  12/13/2012     L4-5     Family History   Problem Relation Age of Onset     Prostate Cancer Father      Other Cancer Father         pancreatic     Prostate Cancer Paternal Grandfather      Other - See Comments Son         cleft palete       Objective  BP (!) 172/98 (BP Location: Right arm, Patient Position: Sitting, Cuff Size: Adult Regular)   Ht 1.746 m (5' 8.75\")   Wt 59 kg (130 lb)   BMI 19.34 kg/m         General: healthy, alert and in no distress      HEENT: no scleral icterus or conjunctival erythema     Skin: no suspicious lesions or rash. No jaundice.     CV: regular rhythm by palpation, 2+ distal pulses, no pedal edema      Resp: normal respiratory effort without conversational dyspnea     Psych: normal mood and affect      Gait: nonantalgic, appropriate coordination and balance     Neuro: normal light touch sensory exam of the extremities. Motor strength as noted below     Right Ankle/Foot Exam:    Inspection:       no visible ecchymosis       edema noted medial ankle along PTT       Normal DP artery pulse       Normal PT artery pulse    Foot inspection:       no deformity noted       pes cavus    ROM:        full ROM with dorsiflexion, plantarflexion, inversion and eversion    Tender:       tibialis posterior tendon, posterior to medial malleolus    Non-Tender:       remainder of foot and ankle    Skin:       well perfused       capillary refill less than 2 seconds    Special Tests:       neg (-) anterior drawer        neg (-) talar tilt        neg (-) external rotation testing     Gait:       normal    Proprioception:        deferred    Right Shoulder exam    ROM:        Full active and passive ROM with flexion, extension, abduction, internal and external rotation.       asymmetric scapular motion on R, mild       Painful terminal flexion and abduction, mildly in ER    Tender:        Lateral deltoid    Non Tender:       remainder of shoulder       sternoclavicular joint      "  acromioclavicular joint       posterior shoulder       periscapular region    Strength:        abduction 5/5       internal rotation 5/5       external rotation 5/5       adduction 5/5    Impingement testing:       neg Neer       neg Cavanaugh       neg empty can       neg (-) crossover       Pos O'dayana       Very mild pain with crank    Stability testing:       neg (-) relocation       neg (-) anterior glide       neg (-) sulcus sign    Skin:       no visible deformities       well perfused       capillary refill brisk    Sensation:        normal sensation over shoulder and upper extremity     Radiology:  Recent Results (from the past 744 hour(s))   XR Ankle Right G/E 3 Views    Narrative    RIGHT ANKLE THREE OR MORE VIEWS   7/19/2019 9:21 AM     HISTORY:  Right ankle pain.      Impression    IMPRESSION: Calcaneal spurring. Otherwise unremarkable.    JIMMIE JANG MD   XR Shoulder Right G/E 3 Views    Narrative    RIGHT SHOULDER THREE OR MORE VIEWS   7/19/2019 9:21 AM     HISTORY:  Right shoulder pain, unspecified chronicity.      Impression    IMPRESSION: Unremarkable exam.    JIMMIE JANG MD       Assessment:  1. Posterior tibial tendinitis of left leg    2. Pain in joint of right shoulder    3. Acute right ankle pain        Plan:  Discussed the assessment with the patient.  Follow up: prn  PT offered and ordered   Pes planus reviewed, superfeet vs orthotics reviewed  trilok brace for support  Activity modification reviewed, focus on lower impact  Consider podiatry referral for labile sx  shouldre pain seems glenohumeral, likely flare of underlying labral damage, reviewed in detail  We discussed modified progressive pain-free activity as tolerated  XR images independently visualized and reviewed with patient today in clinic  No acute findings  Home handouts provided and supportive care reviewed  All questions were answered today  Contact us with additional questions or concerns  Signs and sx of concern  reviewed      Romero Stark DO, OLIVA  Primary Care Sports Medicine  [facility]               Again, thank you for allowing me to participate in the care of your patient.        Sincerely,        Romero Stark DO

## 2019-07-19 NOTE — PROGRESS NOTES
Lane Bosch  :  1965  DOS: 2019  MRN: [id number]    Sports Medicine Clinic Visit    PCP: Franc Mckeon    Lane Bosch is a 54 year old male who is seen as a self referral presenting with right ankle pain and right shoulder.    Injury: 19 for right ankle, no nancy, pain is at his posterior deltoid ligaments.  Pain in the shoulder started over 10 years ago, unspecified anterior superior aspect of right shoulder. Pain located over right ankle and right shoulder, nonradiating.Additional Features:  Positive: Right Ankle: swelling and instability, Right Shoulder: nothing.  Symptoms are better with Nothing for the right ankle, resting feels better for the right shoulder.  Symptoms are worse with: flexion, running and stairs in the morning for the right ankle, overhead motions and overuse causes pain the next day for the right shoulder.  Other evaluation and/or treatments so far consists of: Ice, Heat, Ibuprofen, Rest and Elevation for both shoulder and ankle. Prior imaging: No recent imaging completed.  Prior History of related problems: no prior hx for both ankle and right shoulder.   He takes lisinopril for his blood pressure - he did not take that this am, in the pm is when it is taken    Social History: He is retired, enjoys trail running, swimming, biking.    Review of Systems  Musculoskeletal: as above  Remainder of review of systems is negative including constitutional, CV, pulmonary, GI, Skin and Neurologic except as noted in HPI or medical history.    Past Medical History:   Diagnosis Date     Hypertension      Past Surgical History:   Procedure Laterality Date     C CYSTOTOMY,REPAIR URETEROCELE       HEMILAMINECTOMY, DISCECTOMY LUMBAR ONE LEVEL, COMBINED  2012     L4-5     Family History   Problem Relation Age of Onset     Prostate Cancer Father      Other Cancer Father         pancreatic     Prostate Cancer Paternal Grandfather      Other - See Comments Son         cleft  "palete       Objective  BP (!) 172/98 (BP Location: Right arm, Patient Position: Sitting, Cuff Size: Adult Regular)   Ht 1.746 m (5' 8.75\")   Wt 59 kg (130 lb)   BMI 19.34 kg/m        General: healthy, alert and in no distress      HEENT: no scleral icterus or conjunctival erythema     Skin: no suspicious lesions or rash. No jaundice.     CV: regular rhythm by palpation, 2+ distal pulses, no pedal edema      Resp: normal respiratory effort without conversational dyspnea     Psych: normal mood and affect      Gait: nonantalgic, appropriate coordination and balance     Neuro: normal light touch sensory exam of the extremities. Motor strength as noted below     Right Ankle/Foot Exam:    Inspection:       no visible ecchymosis       edema noted medial ankle along PTT       Normal DP artery pulse       Normal PT artery pulse    Foot inspection:       no deformity noted       pes cavus    ROM:        full ROM with dorsiflexion, plantarflexion, inversion and eversion    Tender:       tibialis posterior tendon, posterior to medial malleolus    Non-Tender:       remainder of foot and ankle    Skin:       well perfused       capillary refill less than 2 seconds    Special Tests:       neg (-) anterior drawer        neg (-) talar tilt        neg (-) external rotation testing     Gait:       normal    Proprioception:        deferred    Right Shoulder exam    ROM:        Full active and passive ROM with flexion, extension, abduction, internal and external rotation.       asymmetric scapular motion on R, mild       Painful terminal flexion and abduction, mildly in ER    Tender:        Lateral deltoid    Non Tender:       remainder of shoulder       sternoclavicular joint       acromioclavicular joint       posterior shoulder       periscapular region    Strength:        abduction 5/5       internal rotation 5/5       external rotation 5/5       adduction 5/5    Impingement testing:       neg Neer       neg Cavanaugh       neg " empty can       neg (-) crossover       Pos O'dayana       Very mild pain with crank    Stability testing:       neg (-) relocation       neg (-) anterior glide       neg (-) sulcus sign    Skin:       no visible deformities       well perfused       capillary refill brisk    Sensation:        normal sensation over shoulder and upper extremity     Radiology:  Recent Results (from the past 744 hour(s))   XR Ankle Right G/E 3 Views    Narrative    RIGHT ANKLE THREE OR MORE VIEWS   7/19/2019 9:21 AM     HISTORY:  Right ankle pain.      Impression    IMPRESSION: Calcaneal spurring. Otherwise unremarkable.    JIMMIE JANG MD   XR Shoulder Right G/E 3 Views    Narrative    RIGHT SHOULDER THREE OR MORE VIEWS   7/19/2019 9:21 AM     HISTORY:  Right shoulder pain, unspecified chronicity.      Impression    IMPRESSION: Unremarkable exam.    JIMMIE JANG MD       Assessment:  1. Posterior tibial tendinitis of left leg    2. Pain in joint of right shoulder    3. Acute right ankle pain        Plan:  Discussed the assessment with the patient.  Follow up: prn  PT offered and ordered   Pes planus reviewed, superfeet vs orthotics reviewed  trilok brace for support  Activity modification reviewed, focus on lower impact  Consider podiatry referral for labile sx  shouldre pain seems glenohumeral, likely flare of underlying labral damage, reviewed in detail  We discussed modified progressive pain-free activity as tolerated  XR images independently visualized and reviewed with patient today in clinic  No acute findings  Home handouts provided and supportive care reviewed  All questions were answered today  Contact us with additional questions or concerns  Signs and sx of concern reviewed      Romero Stark DO, OLIVA  Primary Care Sports Medicine  [facility]

## 2019-07-19 NOTE — PATIENT INSTRUCTIONS
Plan:  - Today's Plan of Care:  Rehab: Physical Therapy: [facility] - [phone]  Medical Equipment: Trilok ankle brace    Follow Up: as needed    If you have any further questions for your physician or physician s care team you can call [phone] and use option 3 to leave a voice message. Calls received during business hours will be returned same day.        Lane to follow up with Primary Care provider regarding elevated blood pressure.

## 2019-08-02 ENCOUNTER — HOSPITAL ENCOUNTER (OUTPATIENT)
Dept: PHYSICAL THERAPY | Facility: CLINIC | Age: 54
Setting detail: THERAPIES SERIES
End: 2019-08-02
Attending: FAMILY MEDICINE
Payer: COMMERCIAL

## 2019-08-02 DIAGNOSIS — M25.511 PAIN IN JOINT OF RIGHT SHOULDER: ICD-10-CM

## 2019-08-02 DIAGNOSIS — M76.822 POSTERIOR TIBIAL TENDINITIS OF LEFT LEG: ICD-10-CM

## 2019-08-02 DIAGNOSIS — M25.571 ACUTE RIGHT ANKLE PAIN: ICD-10-CM

## 2019-08-02 PROCEDURE — 97162 PT EVAL MOD COMPLEX 30 MIN: CPT | Mod: GP | Performed by: PHYSICAL THERAPIST

## 2019-08-02 PROCEDURE — 97140 MANUAL THERAPY 1/> REGIONS: CPT | Mod: GP | Performed by: PHYSICAL THERAPIST

## 2019-08-02 NOTE — PROGRESS NOTES
08/02/19 1400   General Information   Type of Visit Initial OP Ortho PT Evaluation   Start of Care Date 08/02/19   Referring Physician Repa   Patient/Family Goals Statement decrease pain and run   Orders Evaluate and Treat   Date of Order 07/26/19   Medical Diagnosis ankle, hip shoulder pain   Surgical/Medical history reviewed Yes   Precautions/Limitations no known precautions/limitations   Body Part(s)   Body Part(s) Ankle/Foot;Hip   Presentation and Etiology   Pertinent history of current problem (include personal factors and/or comorbidities that impact the POC) hx of ankle pain after a long trail run.  has been bad.  feels twisted.  has hip pain too from limping.  has an awkward gait with the hips.  the hip is more sore in the groin and LLB.     Impairments A. Pain;C. Swelling;D. Decreased ROM   Functional Limitations perform activities of daily living;perform desired leisure / sports activities   Pain rating (0-10 point scale) Best (/10);Worst (/10)   Best (/10) 1   Worst (/10) 5   Pain quality A. Sharp   Frequency of pain/symptoms B. Intermittent   Pain/symptoms are: The same all the time   Pain/symptoms exacerbated by G. Certain positions;H. Overhead reach   Pain/symptoms eased by C. Rest;E. Changing positions   Fall Risk Screen   Have you fallen 2 or more times in the past year? No   Have you fallen and had an injury in the past year? No   Is patient a fall risk? No   Ankle/Foot Objective Findings   Side (if bilateral, select both right and left) Right   Foot Position In Standing feet are fairly nuetral mid and rearfoot prone.  mod Midfoot mobility, normal STJ play   Palpation tender PTT, post tt jct   Right DF (Knee Ext) AROM full   Right DF/Inversion Strength dorsi 4/5 all other 5/5   Right Gastroc (in WB) Flexibility 20 deg   Right Soleus (in WB) Flexibility 22 deg   Hip Objective Findings   Side (if bilateral, select both right and left) Left   Pelvic Screen L post innom, L AKIL post   Straight Leg  Raise Test -   Scour Test -   FADIR Test +   Palpation tender SI margins   Left Hip Flexion PROM 95 with groin pain   Left Hip Abduction PROM 5   Left Hip Adduction PROM 4   Left Hip ER PROM 35   Left Hip IR PROM 5   Left Hip Ext PROM 10, R 20 prone   Left Hamstring Flexibility 75 bilat   Left Piriformis Flexibility L severe tight with groin pain   Planned Therapy Interventions   Planned Therapy Interventions ROM;strengthening;stretching;manual therapy;joint mobilization;balance training;neuromuscular re-education   Clinical Impression   Criteria for Skilled Therapeutic Interventions Met yes, treatment indicated   Clinical Presentation Evolving/Changing   Clinical Presentation Rationale chronic shoulder pain, acute ankle pain and tightness, chronic hip pain,  SP lumbar surgery   Therapy Frequency 1 time/week   Predicted Duration of Therapy Intervention (days/wks) 8   Risk & Benefits of therapy have been explained Yes   Patient, Family & other staff in agreement with plan of care Yes   Education Assessment   Preferred Learning Style Demonstration   Barriers to Learning No barriers   ORTHO GOALS   PT Ortho Eval Goals 1;2;3   Ortho Goal 1   Goal Identifier 1   Goal Description pt will be able to sit without hip pain   Target Date 09/02/19   Ortho Goal 2   Goal Identifier 2   Goal Description pt will be able to jog 3 miles without ankle pain   Target Date 10/02/19   Total Evaluation Time   PT Eval, Moderate Complexity Minutes (17081) 45

## 2019-08-08 ENCOUNTER — HOSPITAL ENCOUNTER (OUTPATIENT)
Dept: PHYSICAL THERAPY | Facility: CLINIC | Age: 54
Setting detail: THERAPIES SERIES
End: 2019-08-08
Attending: FAMILY MEDICINE
Payer: COMMERCIAL

## 2019-08-08 PROCEDURE — 97140 MANUAL THERAPY 1/> REGIONS: CPT | Mod: GP | Performed by: PHYSICAL THERAPIST

## 2019-08-15 ENCOUNTER — HOSPITAL ENCOUNTER (OUTPATIENT)
Dept: PHYSICAL THERAPY | Facility: CLINIC | Age: 54
Setting detail: THERAPIES SERIES
End: 2019-08-15
Attending: FAMILY MEDICINE
Payer: COMMERCIAL

## 2019-08-15 PROCEDURE — 97140 MANUAL THERAPY 1/> REGIONS: CPT | Mod: GP | Performed by: PHYSICAL THERAPIST

## 2019-08-23 ENCOUNTER — HOSPITAL ENCOUNTER (OUTPATIENT)
Dept: PHYSICAL THERAPY | Facility: CLINIC | Age: 54
Setting detail: THERAPIES SERIES
End: 2019-08-23
Attending: FAMILY MEDICINE
Payer: COMMERCIAL

## 2019-08-23 PROCEDURE — 97140 MANUAL THERAPY 1/> REGIONS: CPT | Mod: GP | Performed by: PHYSICAL THERAPIST

## 2019-08-29 ENCOUNTER — HOSPITAL ENCOUNTER (OUTPATIENT)
Dept: PHYSICAL THERAPY | Facility: CLINIC | Age: 54
Setting detail: THERAPIES SERIES
End: 2019-08-29
Attending: FAMILY MEDICINE
Payer: COMMERCIAL

## 2019-08-29 PROCEDURE — 97140 MANUAL THERAPY 1/> REGIONS: CPT | Mod: GP | Performed by: PHYSICAL THERAPIST

## 2019-09-05 ENCOUNTER — HOSPITAL ENCOUNTER (OUTPATIENT)
Dept: PHYSICAL THERAPY | Facility: CLINIC | Age: 54
Setting detail: THERAPIES SERIES
End: 2019-09-05
Attending: FAMILY MEDICINE
Payer: COMMERCIAL

## 2019-09-05 PROCEDURE — 97140 MANUAL THERAPY 1/> REGIONS: CPT | Mod: GP | Performed by: PHYSICAL THERAPIST

## 2019-09-05 PROCEDURE — 97110 THERAPEUTIC EXERCISES: CPT | Mod: GP | Performed by: PHYSICAL THERAPIST

## 2019-09-12 ENCOUNTER — HOSPITAL ENCOUNTER (OUTPATIENT)
Dept: PHYSICAL THERAPY | Facility: CLINIC | Age: 54
Setting detail: THERAPIES SERIES
End: 2019-09-12
Attending: FAMILY MEDICINE
Payer: COMMERCIAL

## 2019-09-12 PROCEDURE — 97112 NEUROMUSCULAR REEDUCATION: CPT | Mod: GP | Performed by: PHYSICAL THERAPIST

## 2019-09-19 ENCOUNTER — HOSPITAL ENCOUNTER (OUTPATIENT)
Dept: PHYSICAL THERAPY | Facility: CLINIC | Age: 54
Setting detail: THERAPIES SERIES
End: 2019-09-19
Attending: FAMILY MEDICINE
Payer: COMMERCIAL

## 2019-09-19 PROCEDURE — 97112 NEUROMUSCULAR REEDUCATION: CPT | Mod: GP | Performed by: PHYSICAL THERAPIST

## 2019-10-30 ENCOUNTER — OFFICE VISIT (OUTPATIENT)
Dept: ORTHOPEDICS | Facility: CLINIC | Age: 54
End: 2019-10-30
Payer: COMMERCIAL

## 2019-10-30 VITALS
HEIGHT: 69 IN | DIASTOLIC BLOOD PRESSURE: 62 MMHG | WEIGHT: 165.4 LBS | BODY MASS INDEX: 24.5 KG/M2 | SYSTOLIC BLOOD PRESSURE: 118 MMHG

## 2019-10-30 DIAGNOSIS — M79.671 PAIN OF RIGHT MIDFOOT: ICD-10-CM

## 2019-10-30 DIAGNOSIS — M25.511 PAIN IN JOINT OF RIGHT SHOULDER: ICD-10-CM

## 2019-10-30 DIAGNOSIS — M25.571 CHRONIC PAIN OF RIGHT ANKLE: Primary | ICD-10-CM

## 2019-10-30 DIAGNOSIS — G89.29 CHRONIC PAIN OF RIGHT ANKLE: Primary | ICD-10-CM

## 2019-10-30 PROCEDURE — 99214 OFFICE O/P EST MOD 30 MIN: CPT | Performed by: FAMILY MEDICINE

## 2019-10-30 ASSESSMENT — MIFFLIN-ST. JEOR: SCORE: 1566.66

## 2019-10-30 NOTE — PROGRESS NOTES
Lane Bosch  :  1965  DOS: 2019  MRN: [id number]    Sports Medicine Clinic Visit    PCP: Franc Mckeon    Lane Bosch is a 54 year old male who is seen as a self referral presenting with right ankle pain and right shoulder.    Injury: 19 for right ankle, no nancy, pain is at his posterior deltoid ligaments.  Pain in the shoulder started over 10 years ago, unspecified anterior superior aspect of right shoulder. Pain located over right ankle and right shoulder, nonradiating.Additional Features:  Positive: Right Ankle: swelling and instability, Right Shoulder: nothing.  Symptoms are better with Nothing for the right ankle, resting feels better for the right shoulder.  Symptoms are worse with: flexion, running and stairs in the morning for the right ankle, overhead motions and overuse causes pain the next day for the right shoulder.  Other evaluation and/or treatments so far consists of: Ice, Heat, Ibuprofen, Rest and Elevation for both shoulder and ankle. Prior imaging: No recent imaging completed.  Prior History of related problems: no prior hx for both ankle and right shoulder.   He takes lisinopril for his blood pressure - he did not take that this am, in the pm is when it is taken    Social History: He is retired, enjoys trail running, swimming, biking.    Interim History - 2019  Since last visit on 2019 patient has overall improved right ankle pain.  Patient has completed 6 visits of PT with Matt Toro in [place].  Currently doing stair master instead of running.  Getting ready for Nordic skiing season. Pain with inversion. No interim injury.       Review of Systems  Musculoskeletal: as above  Remainder of review of systems is negative including constitutional, CV, pulmonary, GI, Skin and Neurologic except as noted in HPI or medical history.    Past Medical History:   Diagnosis Date     Hypertension      Past Surgical History:   Procedure Laterality Date     C  "CYSTOTOMY,REPAIR URETEROCELE  2015     HEMILAMINECTOMY, DISCECTOMY LUMBAR ONE LEVEL, COMBINED  12/13/2012     L4-5     Family History   Problem Relation Age of Onset     Prostate Cancer Father      Other Cancer Father         pancreatic     Prostate Cancer Paternal Grandfather      Other - See Comments Son         cleft palete       Objective  /62   Ht 1.746 m (5' 8.75\")   Wt 75 kg (165 lb 6.4 oz)   BMI 24.60 kg/m        General: healthy, alert and in no distress      HEENT: no scleral icterus or conjunctival erythema     Skin: no suspicious lesions or rash. No jaundice.     CV: regular rhythm by palpation, 2+ distal pulses, no pedal edema      Resp: normal respiratory effort without conversational dyspnea     Psych: normal mood and affect      Gait: nonantalgic, appropriate coordination and balance     Neuro: normal light touch sensory exam of the extremities. Motor strength as noted below     Right Ankle/Foot Exam:    Inspection:       no visible ecchymosis       edema noted medial ankle along PTT, mildly along the lateral ankle as well and lateral midfoot       Normal DP artery pulse       Normal PT artery pulse    Foot inspection:       no deformity noted       pes cavus    ROM:        full ROM with dorsiflexion, plantarflexion, inversion and eversion    Tender:       tibialis posterior tendon, posterior to medial malleolus    Non-Tender:       remainder of foot and ankle    Skin:       well perfused       capillary refill less than 2 seconds    Special Tests:       neg (-) anterior drawer        neg (-) talar tilt        neg (-) external rotation testing     Gait:       normal    Proprioception:        deferred    Right Shoulder exam    ROM:        Full active and passive ROM with flexion, extension, abduction, internal and external rotation.       asymmetric scapular motion on R, mild       Painful terminal flexion and abduction, mildly in ER    Tender:        Lateral deltoid mild       Mild anterior " joint line    Non Tender:       remainder of shoulder       sternoclavicular joint       acromioclavicular joint       posterior shoulder       periscapular region    Strength:        abduction 5/5       internal rotation 5/5       external rotation 5/5       adduction 5/5    Impingement testing:       neg Neer       neg Cavanaugh       neg empty can       neg (-) crossover       Pos O'dayana       Very mild pain with crank    Stability testing:       neg (-) relocation       neg (-) anterior glide       neg (-) sulcus sign    Skin:       no visible deformities       well perfused       capillary refill brisk    Sensation:        normal sensation over shoulder and upper extremity     Radiology:  Recent Results (from the past 744 hour(s))   XR Ankle Right G/E 3 Views    Narrative    RIGHT ANKLE THREE OR MORE VIEWS   7/19/2019 9:21 AM     HISTORY:  Right ankle pain.      Impression    IMPRESSION: Calcaneal spurring. Otherwise unremarkable.    JIMMIE JANG MD   XR Shoulder Right G/E 3 Views    Narrative    RIGHT SHOULDER THREE OR MORE VIEWS   7/19/2019 9:21 AM     HISTORY:  Right shoulder pain, unspecified chronicity.      Impression    IMPRESSION: Unremarkable exam.    JIMMIE JANG MD       Assessment:  1. Chronic pain of right ankle    2. Pain of right midfoot    3. Pain in joint of right shoulder        Plan:  Discussed the assessment with the patient.  Follow up: prn  PT offered and ordered   Pes planus reviewed, superfeet vs orthotics reviewed  Podiatry referral for orthotics and subspecialty opinion available in the future as needed  trilok brace for support reviewed, I recommend trying this for one of his trail runs to see if it helps  Activity modification reviewed, focus on lower impact  MRI ordered today for further evaluation of any structural pathology around his ankle and midfoot  Still suspect that his shoulder pain is glenohumeral, likely flare of underlying labral damage, reviewed in detail  Can offer a  diagnostic and hopefully therapeutic glenohumeral joint injection the future  We discussed modified progressive pain-free activity as tolerated  XR images independently visualized and reviewed with patient today in clinic  No acute findings  Home handouts provided and supportive care reviewed  All questions were answered today  Contact us with additional questions or concerns  Signs and sx of concern reviewed      Romero Stark DO, CAERIK  Primary Care Sports Medicine  [facility]

## 2019-10-30 NOTE — LETTER
10/30/2019         RE: Lane Bosch  [address] [place] [address]        Dear Colleague,    Thank you for referring your patient, Lane Bosch, to the [facility] [place]. Please see a copy of my visit note below.    Lane Bosch  :  1965  DOS: 2019  MRN: [id number]    Sports Medicine Clinic Visit    PCP: Franc Mckeon    Lane Boshc is a 54 year old male who is seen as a self referral presenting with right ankle pain and right shoulder.    Injury: 19 for right ankle, no nancy, pain is at his posterior deltoid ligaments.  Pain in the shoulder started over 10 years ago, unspecified anterior superior aspect of right shoulder. Pain located over right ankle and right shoulder, nonradiating.Additional Features:  Positive: Right Ankle: swelling and instability, Right Shoulder: nothing.  Symptoms are better with Nothing for the right ankle, resting feels better for the right shoulder.  Symptoms are worse with: flexion, running and stairs in the morning for the right ankle, overhead motions and overuse causes pain the next day for the right shoulder.  Other evaluation and/or treatments so far consists of: Ice, Heat, Ibuprofen, Rest and Elevation for both shoulder and ankle. Prior imaging: No recent imaging completed.  Prior History of related problems: no prior hx for both ankle and right shoulder.   He takes lisinopril for his blood pressure - he did not take that this am, in the pm is when it is taken    Social History: He is retired, enjoys trail running, swimming, biking.    Interim History - 2019  Since last visit on 2019 patient has overall improved right ankle pain.  Patient has completed 6 visits of PT with Matt Toro in [place].  Currently doing stair master instead of running.  Getting ready for Nordic skiing season. Pain with inversion. No interim injury.       Review of Systems  Musculoskeletal: as above  Remainder of review  "of systems is negative including constitutional, CV, pulmonary, GI, Skin and Neurologic except as noted in HPI or medical history.    Past Medical History:   Diagnosis Date     Hypertension      Past Surgical History:   Procedure Laterality Date     C CYSTOTOMY,REPAIR URETEROCELE  2015     HEMILAMINECTOMY, DISCECTOMY LUMBAR ONE LEVEL, COMBINED  12/13/2012     L4-5     Family History   Problem Relation Age of Onset     Prostate Cancer Father      Other Cancer Father         pancreatic     Prostate Cancer Paternal Grandfather      Other - See Comments Son         cleft palete       Objective  /62   Ht 1.746 m (5' 8.75\")   Wt 75 kg (165 lb 6.4 oz)   BMI 24.60 kg/m         General: healthy, alert and in no distress      HEENT: no scleral icterus or conjunctival erythema     Skin: no suspicious lesions or rash. No jaundice.     CV: regular rhythm by palpation, 2+ distal pulses, no pedal edema      Resp: normal respiratory effort without conversational dyspnea     Psych: normal mood and affect      Gait: nonantalgic, appropriate coordination and balance     Neuro: normal light touch sensory exam of the extremities. Motor strength as noted below     Right Ankle/Foot Exam:    Inspection:       no visible ecchymosis       edema noted medial ankle along PTT, mildly along the lateral ankle as well and lateral midfoot       Normal DP artery pulse       Normal PT artery pulse    Foot inspection:       no deformity noted       pes cavus    ROM:        full ROM with dorsiflexion, plantarflexion, inversion and eversion    Tender:       tibialis posterior tendon, posterior to medial malleolus    Non-Tender:       remainder of foot and ankle    Skin:       well perfused       capillary refill less than 2 seconds    Special Tests:       neg (-) anterior drawer        neg (-) talar tilt        neg (-) external rotation testing     Gait:       normal    Proprioception:        deferred    Right Shoulder exam    ROM:        Full " active and passive ROM with flexion, extension, abduction, internal and external rotation.       asymmetric scapular motion on R, mild       Painful terminal flexion and abduction, mildly in ER    Tender:        Lateral deltoid mild       Mild anterior joint line    Non Tender:       remainder of shoulder       sternoclavicular joint       acromioclavicular joint       posterior shoulder       periscapular region    Strength:        abduction 5/5       internal rotation 5/5       external rotation 5/5       adduction 5/5    Impingement testing:       neg Neer       neg Cavanaugh       neg empty can       neg (-) crossover       Pos O'dayana       Very mild pain with crank    Stability testing:       neg (-) relocation       neg (-) anterior glide       neg (-) sulcus sign    Skin:       no visible deformities       well perfused       capillary refill brisk    Sensation:        normal sensation over shoulder and upper extremity     Radiology:  Recent Results (from the past 744 hour(s))   XR Ankle Right G/E 3 Views    Narrative    RIGHT ANKLE THREE OR MORE VIEWS   7/19/2019 9:21 AM     HISTORY:  Right ankle pain.      Impression    IMPRESSION: Calcaneal spurring. Otherwise unremarkable.    JIMMIE JANG MD   XR Shoulder Right G/E 3 Views    Narrative    RIGHT SHOULDER THREE OR MORE VIEWS   7/19/2019 9:21 AM     HISTORY:  Right shoulder pain, unspecified chronicity.      Impression    IMPRESSION: Unremarkable exam.    JIMMIE JANG MD       Assessment:  1. Chronic pain of right ankle    2. Pain of right midfoot    3. Pain in joint of right shoulder        Plan:  Discussed the assessment with the patient.  Follow up: prn  PT offered and ordered   Pes planus reviewed, superfeet vs orthotics reviewed  Podiatry referral for orthotics and subspecialty opinion available in the future as needed  trilok brace for support reviewed, I recommend trying this for one of his trail runs to see if it helps  Activity modification reviewed,  focus on lower impact  MRI ordered today for further evaluation of any structural pathology around his ankle and midfoot  Still suspect that his shoulder pain is glenohumeral, likely flare of underlying labral damage, reviewed in detail  Can offer a diagnostic and hopefully therapeutic glenohumeral joint injection the future  We discussed modified progressive pain-free activity as tolerated  XR images independently visualized and reviewed with patient today in clinic  No acute findings  Home handouts provided and supportive care reviewed  All questions were answered today  Contact us with additional questions or concerns  Signs and sx of concern reviewed      Romero Stark DO, OLIVA  Primary Care Sports Medicine  [facility]               Again, thank you for allowing me to participate in the care of your patient.        Sincerely,        Romero Stark DO

## 2019-11-05 ENCOUNTER — HOSPITAL ENCOUNTER (OUTPATIENT)
Dept: MRI IMAGING | Facility: CLINIC | Age: 54
Discharge: HOME | End: 2019-11-05
Attending: FAMILY MEDICINE | Admitting: FAMILY MEDICINE
Payer: COMMERCIAL

## 2019-11-05 DIAGNOSIS — G89.29 CHRONIC PAIN OF RIGHT ANKLE: ICD-10-CM

## 2019-11-05 DIAGNOSIS — M25.571 CHRONIC PAIN OF RIGHT ANKLE: ICD-10-CM

## 2019-11-05 DIAGNOSIS — M79.671 PAIN OF RIGHT MIDFOOT: ICD-10-CM

## 2019-11-05 PROCEDURE — 73721 MRI JNT OF LWR EXTRE W/O DYE: CPT | Mod: RT

## 2019-11-07 ENCOUNTER — TELEPHONE (OUTPATIENT)
Dept: ORTHOPEDICS | Facility: CLINIC | Age: 54
End: 2019-11-07

## 2019-12-28 ENCOUNTER — TELEPHONE (OUTPATIENT)
Dept: FAMILY MEDICINE | Facility: CLINIC | Age: 54
End: 2019-12-28

## 2019-12-28 DIAGNOSIS — I10 BENIGN ESSENTIAL HYPERTENSION: ICD-10-CM

## 2019-12-30 NOTE — TELEPHONE ENCOUNTER
Patient did not want to make appointment, just said that he didn't have his calendar. Trudi Christensen on 12/30/2019 at 4:34 PM

## 2019-12-30 NOTE — TELEPHONE ENCOUNTER
Left message on answering machine for patient to call back.    Patient needs appointment.    Thank you    Ysabel GASTON RN

## 2019-12-30 NOTE — TELEPHONE ENCOUNTER
"Requested Prescriptions   Pending Prescriptions Disp Refills     lisinopril (PRINIVIL/ZESTRIL) 10 MG tablet [Pharmacy Med Name: LISINOPRIL 10MG TABLETS] 90 tablet 3     Sig: TAKE 1 TABLET(10 MG) BY MOUTH DAILY   Last Written Prescription Date:  11/16/18  Last Fill Quantity: 90 tab,  # refills: 3   Last office visit: 11/16/18 previous visit found with prescribing provider:  Franc Mckeon     Future Office Visit:        ACE Inhibitors (Including Combos) Protocol Failed - 12/28/2019  3:46 PM        Failed - Recent (12 mo) or future (30 days) visit within the authorizing provider's specialty     Patient has had an office visit with the authorizing provider or a provider within the authorizing providers department within the previous 12 mos or has a future within next 30 days. See \"Patient Info\" tab in inbasket, or \"Choose Columns\" in Meds & Orders section of the refill encounter.              Failed - Normal serum creatinine on file in past 12 months     Recent Labs   Lab Test 11/16/18  1411   CR 0.77             Failed - Normal serum potassium on file in past 12 months     Recent Labs   Lab Test 11/16/18  1411   POTASSIUM 3.8             Passed - Blood pressure under 140/90 in past 12 months     BP Readings from Last 3 Encounters:   10/30/19 118/62   07/19/19 (!) 172/98   11/16/18 124/70                 Passed - Medication is active on med list        Passed - Patient is age 18 or older          "

## 2019-12-31 RX ORDER — LISINOPRIL 10 MG/1
TABLET ORAL
Qty: 30 TABLET | Refills: 0 | Status: SHIPPED | OUTPATIENT
Start: 2019-12-31 | End: 2020-03-18

## 2020-03-11 ENCOUNTER — HEALTH MAINTENANCE LETTER (OUTPATIENT)
Age: 55
End: 2020-03-11

## 2020-03-18 ENCOUNTER — VIRTUAL VISIT (OUTPATIENT)
Dept: FAMILY MEDICINE | Facility: CLINIC | Age: 55
End: 2020-03-18
Payer: COMMERCIAL

## 2020-03-18 DIAGNOSIS — I10 BENIGN ESSENTIAL HYPERTENSION: ICD-10-CM

## 2020-03-18 PROCEDURE — 99441 ZZC PHYSICIAN TELEPHONE EVALUATION 5-10 MIN: CPT | Performed by: FAMILY MEDICINE

## 2020-03-18 RX ORDER — LISINOPRIL 10 MG/1
10 TABLET ORAL DAILY
Qty: 90 TABLET | Refills: 4 | Status: SHIPPED | OUTPATIENT
Start: 2020-03-18 | End: 2020-03-20

## 2020-03-18 NOTE — PROGRESS NOTES
"Lane Bosch is a 54 year old male who is being evaluated via a billable telephone visit.      The patient has been notified of following:     \"This telephone visit will be conducted via a call between you and your physician/provider. We have found that certain health care needs can be provided without the need for a physical exam.  This service lets us provide the care you need with a short phone conversation.  If a prescription is necessary we can send it directly to your pharmacy.  If lab work is needed we can place an order for that and you can then stop by our lab to have the test done at a later time.    If during the course of the call the physician/provider feels a telephone visit is not appropriate, you will not be charged for this service.\"       Lane Bosch complains of    Chief Complaint   Patient presents with     Recheck Medication     Lisinopril       I have reviewed and updated the patient's Past Medical History, Social History, Family History and Medication List.    ALLERGIES  Patient has no known allergies.    Jazlyn Pereira MA   (MA signature)      Hypertension Follow-up      Do you check your blood pressure regularly outside of the clinic? No Yes at dentist is in goal.    Are you following a low salt diet? No    Are your blood pressures ever more than 140 on the top number (systolic) OR more   than 90 on the bottom number (diastolic), for example 140/90? No    No orthostasis.  Working out daily, running. Has lost 7 pounds over the last 5 months with exercise.  Shingles shot question: recommend to discuss at physical in 5-6 months.  Colonoscopy done around age 48 in [facility], need records, plan to sign at next physical.  Assessment/Plan:  (I10) Benign essential hypertension  Comment: stable, refill, plan physical and labs in 5-6 months.  Plan: lisinopril (ZESTRIL) 10 MG tablet oral daily.              I have reviewed the note as documented above.  This accurately captures the substance of " my conversation with the patient.    Phone call contact time  7:44 as per phone timer    Franc Mckeon MD

## 2020-03-20 ENCOUNTER — MYC MEDICAL ADVICE (OUTPATIENT)
Dept: FAMILY MEDICINE | Facility: CLINIC | Age: 55
End: 2020-03-20

## 2020-03-20 DIAGNOSIS — I10 BENIGN ESSENTIAL HYPERTENSION: ICD-10-CM

## 2020-03-20 RX ORDER — LISINOPRIL 20 MG/1
20 TABLET ORAL DAILY
Qty: 90 TABLET | Refills: 3 | Status: SHIPPED | OUTPATIENT
Start: 2020-03-20 | End: 2020-09-08

## 2020-03-20 NOTE — TELEPHONE ENCOUNTER
See MyChart, are you recommending coming to clinic or continuing to monitor.      KALEB WillamsN, RN

## 2020-09-08 ENCOUNTER — OFFICE VISIT (OUTPATIENT)
Dept: FAMILY MEDICINE | Facility: CLINIC | Age: 55
End: 2020-09-08
Payer: COMMERCIAL

## 2020-09-08 VITALS
DIASTOLIC BLOOD PRESSURE: 78 MMHG | HEIGHT: 69 IN | WEIGHT: 162 LBS | OXYGEN SATURATION: 98 % | SYSTOLIC BLOOD PRESSURE: 126 MMHG | RESPIRATION RATE: 16 BRPM | TEMPERATURE: 96.8 F | HEART RATE: 52 BPM | BODY MASS INDEX: 23.99 KG/M2

## 2020-09-08 DIAGNOSIS — Z23 NEED FOR PROPHYLACTIC VACCINATION AND INOCULATION AGAINST INFLUENZA: ICD-10-CM

## 2020-09-08 DIAGNOSIS — Z23 NEED FOR VACCINATION: ICD-10-CM

## 2020-09-08 DIAGNOSIS — Z12.5 SCREENING FOR PROSTATE CANCER: ICD-10-CM

## 2020-09-08 DIAGNOSIS — Z12.11 COLON CANCER SCREENING: Primary | ICD-10-CM

## 2020-09-08 DIAGNOSIS — L57.0 ACTINIC KERATOSIS: ICD-10-CM

## 2020-09-08 DIAGNOSIS — G57.02 PIRIFORMIS SYNDROME, LEFT: ICD-10-CM

## 2020-09-08 DIAGNOSIS — I10 BENIGN ESSENTIAL HYPERTENSION: ICD-10-CM

## 2020-09-08 LAB
ANION GAP SERPL CALCULATED.3IONS-SCNC: 4 MMOL/L (ref 3–14)
BUN SERPL-MCNC: 11 MG/DL (ref 7–30)
CALCIUM SERPL-MCNC: 8.8 MG/DL (ref 8.5–10.1)
CHLORIDE SERPL-SCNC: 104 MMOL/L (ref 94–109)
CHOLEST SERPL-MCNC: 142 MG/DL
CO2 SERPL-SCNC: 30 MMOL/L (ref 20–32)
CREAT SERPL-MCNC: 0.75 MG/DL (ref 0.66–1.25)
GFR SERPL CREATININE-BSD FRML MDRD: >90 ML/MIN/{1.73_M2}
GLUCOSE SERPL-MCNC: 95 MG/DL (ref 70–99)
HDLC SERPL-MCNC: 62 MG/DL
LDLC SERPL CALC-MCNC: 71 MG/DL
NONHDLC SERPL-MCNC: 80 MG/DL
POTASSIUM SERPL-SCNC: 3.9 MMOL/L (ref 3.4–5.3)
PSA SERPL-ACNC: 1.29 UG/L (ref 0–4)
SODIUM SERPL-SCNC: 138 MMOL/L (ref 133–144)
TRIGL SERPL-MCNC: 44 MG/DL

## 2020-09-08 PROCEDURE — G0103 PSA SCREENING: HCPCS | Performed by: FAMILY MEDICINE

## 2020-09-08 PROCEDURE — 90471 IMMUNIZATION ADMIN: CPT | Performed by: FAMILY MEDICINE

## 2020-09-08 PROCEDURE — 90472 IMMUNIZATION ADMIN EACH ADD: CPT | Performed by: FAMILY MEDICINE

## 2020-09-08 PROCEDURE — 90750 HZV VACC RECOMBINANT IM: CPT | Performed by: FAMILY MEDICINE

## 2020-09-08 PROCEDURE — 36415 COLL VENOUS BLD VENIPUNCTURE: CPT | Performed by: FAMILY MEDICINE

## 2020-09-08 PROCEDURE — 90682 RIV4 VACC RECOMBINANT DNA IM: CPT | Performed by: FAMILY MEDICINE

## 2020-09-08 PROCEDURE — 17000 DESTRUCT PREMALG LESION: CPT | Performed by: FAMILY MEDICINE

## 2020-09-08 PROCEDURE — 90715 TDAP VACCINE 7 YRS/> IM: CPT | Performed by: FAMILY MEDICINE

## 2020-09-08 PROCEDURE — 99214 OFFICE O/P EST MOD 30 MIN: CPT | Mod: 25 | Performed by: FAMILY MEDICINE

## 2020-09-08 PROCEDURE — 80048 BASIC METABOLIC PNL TOTAL CA: CPT | Performed by: FAMILY MEDICINE

## 2020-09-08 PROCEDURE — 80061 LIPID PANEL: CPT | Performed by: FAMILY MEDICINE

## 2020-09-08 RX ORDER — LISINOPRIL 20 MG/1
20 TABLET ORAL DAILY
Qty: 90 TABLET | Refills: 3 | Status: SHIPPED | OUTPATIENT
Start: 2020-09-08 | End: 2021-05-21

## 2020-09-08 ASSESSMENT — MIFFLIN-ST. JEOR: SCORE: 1550.21

## 2020-09-08 NOTE — PATIENT INSTRUCTIONS
Piriformis muscle: continue Physical therapy, rolling muscle,   Next step could be a trigger point injection. So I'll have the [place] [facility] call to schedule a consult for procedure.      Patient Education     Understanding Actinic Keratosis    Actinic keratosis is a skin growth caused by sun damage. These growths are not cancer, but they may develop into skin cancer (precancerous). Many people get these growths, especially as they age. This is because of cumulative sun exposure over years. Multiple growths are called actinic keratoses.  What causes actinic keratosis?  Sun damage causes actinic keratosis. These growths usually appear on skin that is most often exposed to the sun, such as the face, ears, or back of the hands. People who easily sunburn are likely to develop actinic keratoses. Actinic keratoses often appear later in life from cumulative, extensive sun exposure.  What are the symptoms of actinic keratosis?  Actinic keratosis growths may be described as:    Rough, like sandpaper    Wart-like    Scaly or scabby    More easily felt than seen  They may appear singly or in clusters. They may start out as red patches, and the skin around them may be discolored.  Actinic keratoses usually are not painful. For some people they may make the skin feel tender.  How is actinic keratosis treated?  Because actinic keratoses may develop into skin cancer, a healthcare provider should look at them. Your healthcare provider may choose to remove one or more of these growths. It may be examined under a microscope. This is called a biopsy. You may also wish to have actinic keratoses removed if they bother you. They can be removed in several ways:    By using a medicine on the skin such as 5 fluorouracil or imiquimod    By removing them with a scalpel    By freezing them with liquid nitrogen  How can I prevent actinic keratoses?  Avoiding sun damage to your skin is the best way to prevent actinic keratoses. Here are  some ways to protect your skin:    Use sunscreen with an SPF of 30 or higher on exposed skin when you are outside.    Wear a hat to protect your face and scalp. Consider wearing clothing that covers your arms and legs.    Avoid the sun in the middle of the day, when sunlight is most direct.    Be aware of how long you have been out in the sun. Reapply sunscreen according to package directions.    Do not use tanning beds.  What are the complications of actinic keratosis?  Actinic keratoses are a sign of skin damage. They may become cancerous. It s a good idea to ask your healthcare provider to check new skin growths. Report any skin problem that concerns you.  When should I call my healthcare provider?  Call your healthcare provider right away if any of these occur:    You have an actinic keratosis sore that does not respond to treatment    You have an actinic keratosis sore that does not heal within a few weeks, or heals and then comes back    You have a skin growth that is changing in size, shape, or color    You have a skin growth that looks different on one side from the other    You have a skin growth that is not the same color throughout   Date Last Reviewed: 5/1/2016 2000-2019 [employer]. [address] [place] [address], [place] [address]. All rights reserved. This information is not intended as a substitute for professional medical care. Always follow your healthcare professional's instructions.

## 2020-09-08 NOTE — H&P (VIEW-ONLY)
Subjective     Lane Bosch is a 55 year old male who presents to clinic today for the following health issues:    HPI   - informed patient that we may not have time to go over everything. Patient states the skin issue is more important to him.    Hypertension Follow-up      Do you check your blood pressure regularly outside of the clinic? Yes- at home    Are you following a low salt diet? Yes    Are your blood pressures ever more than 140 on the top number (systolic) OR more   than 90 on the bottom number (diastolic), for example 140/90? Yes- it was but in the last month it has gone down. Patient decreased his coffee intake.      How many servings of fruits and vegetables do you eat daily?  4 or more    On average, how many sweetened beverages do you drink each day (Examples: soda, juice, sweet tea, etc.  Do NOT count diet or artificially sweetened beverages)?   0    How many days per week do you exercise enough to make your heart beat faster? 7    How many minutes a day do you exercise enough to make your heart beat faster? 60 or more    How many days per week do you miss taking your medication? 0    Skin issue: Patient has a couple spots on his skin that he needs checked out. One on right had, one on forehead, one on right rib cage are, and one on right lower arm. Patient states his mother has basal cell carcinoma.     Back pain:  Left side low back in to upper buttocks. Patient states this has been going on for about a year off and on. Patient rates his pain today at a 3 out of 10. Patient states it doesn't hurt when walking, lifting, and twisting. Patient states it only hurts at night, when he goes for run, and when he sits. Patient wondering about a referral or wondering what he should do. He doesn't do anything at home for his pain.        Review of Systems   Constitutional, HEENT, cardiovascular, pulmonary, gi and gu systems are negative, except as otherwise noted.      Objective    /78   Pulse 52    "Temp 96.8  F (36  C) (Tympanic)   Resp 16   Ht 1.753 m (5' 9\")   Wt 73.5 kg (162 lb)   SpO2 98%   BMI 23.92 kg/m    Body mass index is 23.92 kg/m .  Physical Exam   GENERAL: healthy, alert and no distress  NECK: no adenopathy, no asymmetry, masses, or scars and thyroid normal to palpation  RESP: lungs clear to auscultation - no rales, rhonchi or wheezes  CV: regular rate and rhythm, normal S1 S2, no S3 or S4, no murmur, click or rub, no peripheral edema and peripheral pulses strong  MS: extremities normal- no gross deformities noted  Low  left piriformis only, non-tender rest of low back and SI joints.  Left hip decrease RANGE OF MOTION so unable to stretch piriformis.  SKIN: no suspicious lesions or rashes and keratoses - actinic # 3 right hand, left side of forehead, and right forearm. - frozen The lesions were frozen to an ice ball of 3mm beyond the lesion and allowed to completely thaw and the freeze/thaw cycle was repeated one more time.              Assessment & Plan     Lane was seen today for hypertension, skin check, back pain, flu shot, imm/inj and imm/inj.    Diagnoses and all orders for this visit:    Colon cancer screening  -     GASTROENTEROLOGY ADULT REF PROCEDURE ONLY; Future    Benign essential hypertension: stable, refill and check labs.  -     Lipid panel reflex to direct LDL Fasting  -     lisinopril (ZESTRIL) 20 MG tablet; Take 1 tablet (20 mg) by mouth daily  -     Basic metabolic panel  (Ca, Cl, CO2, Creat, Gluc, K, Na, BUN)    Screening for prostate cancer  -     PSA, screen    Actinic keratosis: cryotherapy    Piriformis syndrome, left: has done Physical therapy and still doing home exercises, dose stress ball rolling, unable to stretch, plan trigger point injection.  -     PAIN MANAGEMENT REFERRAL; Future    Need for vaccination  -     ZOSTER VACCINE RECOMBINANT ADJUVANTED IM NJX  -     TDAP VACCINE  -     EA ADD'L VACCINE    Need for prophylactic vaccination and " inoculation against influenza  -     INFLUENZA QUAD, RECOMBINANT, P-FREE (RIV4) (FLUBLOCK) [79552]  -     Vaccine Administration, Initial [[id number]]           Patient Instructions     Piriformis muscle: continue Physical therapy, rolling muscle,   Next step could be a trigger point injection. So I'll have the [facility] call to schedule a consult for procedure.      Patient Education     Understanding Actinic Keratosis    Actinic keratosis is a skin growth caused by sun damage. These growths are not cancer, but they may develop into skin cancer (precancerous). Many people get these growths, especially as they age. This is because of cumulative sun exposure over years. Multiple growths are called actinic keratoses.  What causes actinic keratosis?  Sun damage causes actinic keratosis. These growths usually appear on skin that is most often exposed to the sun, such as the face, ears, or back of the hands. People who easily sunburn are likely to develop actinic keratoses. Actinic keratoses often appear later in life from cumulative, extensive sun exposure.  What are the symptoms of actinic keratosis?  Actinic keratosis growths may be described as:    Rough, like sandpaper    Wart-like    Scaly or scabby    More easily felt than seen  They may appear singly or in clusters. They may start out as red patches, and the skin around them may be discolored.  Actinic keratoses usually are not painful. For some people they may make the skin feel tender.  How is actinic keratosis treated?  Because actinic keratoses may develop into skin cancer, a healthcare provider should look at them. Your healthcare provider may choose to remove one or more of these growths. It may be examined under a microscope. This is called a biopsy. You may also wish to have actinic keratoses removed if they bother you. They can be removed in several ways:    By using a medicine on the skin such as 5 fluorouracil or imiquimod    By removing them with  a scalpel    By freezing them with liquid nitrogen  How can I prevent actinic keratoses?  Avoiding sun damage to your skin is the best way to prevent actinic keratoses. Here are some ways to protect your skin:    Use sunscreen with an SPF of 30 or higher on exposed skin when you are outside.    Wear a hat to protect your face and scalp. Consider wearing clothing that covers your arms and legs.    Avoid the sun in the middle of the day, when sunlight is most direct.    Be aware of how long you have been out in the sun. Reapply sunscreen according to package directions.    Do not use tanning beds.  What are the complications of actinic keratosis?  Actinic keratoses are a sign of skin damage. They may become cancerous. It s a good idea to ask your healthcare provider to check new skin growths. Report any skin problem that concerns you.  When should I call my healthcare provider?  Call your healthcare provider right away if any of these occur:    You have an actinic keratosis sore that does not respond to treatment    You have an actinic keratosis sore that does not heal within a few weeks, or heals and then comes back    You have a skin growth that is changing in size, shape, or color    You have a skin growth that looks different on one side from the other    You have a skin growth that is not the same color throughout   Date Last Reviewed: 5/1/2016 2000-2019 [employer]. [address] [place] [address]. All rights reserved. This information is not intended as a substitute for professional medical care. Always follow your healthcare professional's instructions.               Return in about 4 weeks (around 10/6/2020).    Franc Mckeon MD  [facility]

## 2020-09-08 NOTE — NURSING NOTE
Chief Complaint   Patient presents with     Hypertension     Skin Check     Back Pain     Flu Shot     Imm/Inj     tdap, shingrix - patient did check with insurance andshingrix  is covered in clinic.      Imm/Inj     Flu Shot         Prior to immunization administration, verified patients identity using patient s name and date of birth. Please see Immunization Activity for additional information.     Screening Questionnaire for Adult Immunization    Are you sick today?   No   Do you have allergies to medications, food, a vaccine component or latex?   No   Have you ever had a serious reaction after receiving a vaccination?   No   Do you have a long-term health problem with heart, lung, kidney, or metabolic disease (e.g., diabetes), asthma, a blood disorder, no spleen, complement component deficiency, a cochlear implant, or a spinal fluid leak?  Are you on long-term aspirin therapy?   No   Do you have cancer, leukemia, HIV/AIDS, or any other immune system problem?   No   Do you have a parent, brother, or sister with an immune system problem?   No   In the past 3 months, have you taken medications that affect  your immune system, such as prednisone, other steroids, or anticancer drugs; drugs for the treatment of rheumatoid arthritis, Crohn s disease, or psoriasis; or have you had radiation treatments?   No   Have you had a seizure, or a brain or other nervous system problem?   No   During the past year, have you received a transfusion of blood or blood    products, or been given immune (gamma) globulin or antiviral drug?   No   For women: Are you pregnant or is there a chance you could become       pregnant during the next month?   No   Have you received any vaccinations in the past 4 weeks?   No     Immunization questionnaire answers were all negative.         Patient instructed to remain in clinic for 15 minutes afterwards, and to report any adverse reaction to me immediately.       Screening performed by Maynor JUAREZ  BOB Garcia on 9/8/2020 at 10:07 AM.

## 2020-09-08 NOTE — PROGRESS NOTES
Subjective     Lane Bosch is a 55 year old male who presents to clinic today for the following health issues:    HPI   - informed patient that we may not have time to go over everything. Patient states the skin issue is more important to him.    Hypertension Follow-up      Do you check your blood pressure regularly outside of the clinic? Yes- at home    Are you following a low salt diet? Yes    Are your blood pressures ever more than 140 on the top number (systolic) OR more   than 90 on the bottom number (diastolic), for example 140/90? Yes- it was but in the last month it has gone down. Patient decreased his coffee intake.      How many servings of fruits and vegetables do you eat daily?  4 or more    On average, how many sweetened beverages do you drink each day (Examples: soda, juice, sweet tea, etc.  Do NOT count diet or artificially sweetened beverages)?   0    How many days per week do you exercise enough to make your heart beat faster? 7    How many minutes a day do you exercise enough to make your heart beat faster? 60 or more    How many days per week do you miss taking your medication? 0    Skin issue: Patient has a couple spots on his skin that he needs checked out. One on right had, one on forehead, one on right rib cage are, and one on right lower arm. Patient states his mother has basal cell carcinoma.     Back pain:  Left side low back in to upper buttocks. Patient states this has been going on for about a year off and on. Patient rates his pain today at a 3 out of 10. Patient states it doesn't hurt when walking, lifting, and twisting. Patient states it only hurts at night, when he goes for run, and when he sits. Patient wondering about a referral or wondering what he should do. He doesn't do anything at home for his pain.        Review of Systems   Constitutional, HEENT, cardiovascular, pulmonary, gi and gu systems are negative, except as otherwise noted.      Objective    /78   Pulse 52    "Temp 96.8  F (36  C) (Tympanic)   Resp 16   Ht 1.753 m (5' 9\")   Wt 73.5 kg (162 lb)   SpO2 98%   BMI 23.92 kg/m    Body mass index is 23.92 kg/m .  Physical Exam   GENERAL: healthy, alert and no distress  NECK: no adenopathy, no asymmetry, masses, or scars and thyroid normal to palpation  RESP: lungs clear to auscultation - no rales, rhonchi or wheezes  CV: regular rate and rhythm, normal S1 S2, no S3 or S4, no murmur, click or rub, no peripheral edema and peripheral pulses strong  MS: extremities normal- no gross deformities noted  Low  left piriformis only, non-tender rest of low back and SI joints.  Left hip decrease RANGE OF MOTION so unable to stretch piriformis.  SKIN: no suspicious lesions or rashes and keratoses - actinic # 3 right hand, left side of forehead, and right forearm. - frozen The lesions were frozen to an ice ball of 3mm beyond the lesion and allowed to completely thaw and the freeze/thaw cycle was repeated one more time.              Assessment & Plan     Lane was seen today for hypertension, skin check, back pain, flu shot, imm/inj and imm/inj.    Diagnoses and all orders for this visit:    Colon cancer screening  -     GASTROENTEROLOGY ADULT REF PROCEDURE ONLY; Future    Benign essential hypertension: stable, refill and check labs.  -     Lipid panel reflex to direct LDL Fasting  -     lisinopril (ZESTRIL) 20 MG tablet; Take 1 tablet (20 mg) by mouth daily  -     Basic metabolic panel  (Ca, Cl, CO2, Creat, Gluc, K, Na, BUN)    Screening for prostate cancer  -     PSA, screen    Actinic keratosis: cryotherapy    Piriformis syndrome, left: has done Physical therapy and still doing home exercises, dose stress ball rolling, unable to stretch, plan trigger point injection.  -     PAIN MANAGEMENT REFERRAL; Future    Need for vaccination  -     ZOSTER VACCINE RECOMBINANT ADJUVANTED IM NJX  -     TDAP VACCINE  -     EA ADD'L VACCINE    Need for prophylactic vaccination and " inoculation against influenza  -     INFLUENZA QUAD, RECOMBINANT, P-FREE (RIV4) (FLUBLOCK) [64956]  -     Vaccine Administration, Initial [49056]           Patient Instructions     Piriformis muscle: continue Physical therapy, rolling muscle,   Next step could be a trigger point injection. So I'll have the [place] call to schedule a consult for procedure.      Patient Education     Understanding Actinic Keratosis    Actinic keratosis is a skin growth caused by sun damage. These growths are not cancer, but they may develop into skin cancer (precancerous). Many people get these growths, especially as they age. This is because of cumulative sun exposure over years. Multiple growths are called actinic keratoses.  What causes actinic keratosis?  Sun damage causes actinic keratosis. These growths usually appear on skin that is most often exposed to the sun, such as the face, ears, or back of the hands. People who easily sunburn are likely to develop actinic keratoses. Actinic keratoses often appear later in life from cumulative, extensive sun exposure.  What are the symptoms of actinic keratosis?  Actinic keratosis growths may be described as:    Rough, like sandpaper    Wart-like    Scaly or scabby    More easily felt than seen  They may appear singly or in clusters. They may start out as red patches, and the skin around them may be discolored.  Actinic keratoses usually are not painful. For some people they may make the skin feel tender.  How is actinic keratosis treated?  Because actinic keratoses may develop into skin cancer, a healthcare provider should look at them. Your healthcare provider may choose to remove one or more of these growths. It may be examined under a microscope. This is called a biopsy. You may also wish to have actinic keratoses removed if they bother you. They can be removed in several ways:    By using a medicine on the skin such as 5 fluorouracil or imiquimod    By removing them with  a scalpel    By freezing them with liquid nitrogen  How can I prevent actinic keratoses?  Avoiding sun damage to your skin is the best way to prevent actinic keratoses. Here are some ways to protect your skin:    Use sunscreen with an SPF of 30 or higher on exposed skin when you are outside.    Wear a hat to protect your face and scalp. Consider wearing clothing that covers your arms and legs.    Avoid the sun in the middle of the day, when sunlight is most direct.    Be aware of how long you have been out in the sun. Reapply sunscreen according to package directions.    Do not use tanning beds.  What are the complications of actinic keratosis?  Actinic keratoses are a sign of skin damage. They may become cancerous. It s a good idea to ask your healthcare provider to check new skin growths. Report any skin problem that concerns you.  When should I call my healthcare provider?  Call your healthcare provider right away if any of these occur:    You have an actinic keratosis sore that does not respond to treatment    You have an actinic keratosis sore that does not heal within a few weeks, or heals and then comes back    You have a skin growth that is changing in size, shape, or color    You have a skin growth that looks different on one side from the other    You have a skin growth that is not the same color throughout   Date Last Reviewed: 5/1/2016 2000-2019 [employer]. [address] [place] [address]. All rights reserved. This information is not intended as a substitute for professional medical care. Always follow your healthcare professional's instructions.               Return in about 4 weeks (around 10/6/2020).    Franc Mckeon MD  [facility]

## 2020-09-22 DIAGNOSIS — Z11.59 ENCOUNTER FOR SCREENING FOR OTHER VIRAL DISEASES: Primary | ICD-10-CM

## 2020-09-23 ENCOUNTER — VIRTUAL VISIT (OUTPATIENT)
Dept: PALLIATIVE MEDICINE | Facility: CLINIC | Age: 55
End: 2020-09-23
Attending: FAMILY MEDICINE
Payer: COMMERCIAL

## 2020-09-23 DIAGNOSIS — G57.02 PIRIFORMIS SYNDROME, LEFT: ICD-10-CM

## 2020-09-23 PROCEDURE — 99204 OFFICE O/P NEW MOD 45 MIN: CPT | Mod: 95 | Performed by: PAIN MEDICINE

## 2020-09-23 RX ORDER — METHOCARBAMOL 500 MG/1
500 TABLET, FILM COATED ORAL 2 TIMES DAILY PRN
Qty: 60 TABLET | Refills: 1 | Status: SHIPPED | OUTPATIENT
Start: 2020-09-23 | End: 2020-11-30

## 2020-09-23 ASSESSMENT — PAIN SCALES - GENERAL: PAINLEVEL: MILD PAIN (2)

## 2020-09-23 NOTE — PATIENT INSTRUCTIONS
- Further procedures recommended:    - consider Left piriformis injection  - Medication Management:    - start robaxin 500mg BID prn   - Physical Therapy:    - order placed for [facility] PT  - Clinical Health Psychologist to address issues of relaxation, behavioral change, coping style, and other factors important to improvement: no  - Diagnostic Studies: Consider lumbar MRI if no improvement   - Urine toxicology screen today: no   - Follow up:    - 1 month virtual   ----------------------------------------------------------------  Clinic Number:  [phone]     Call with any questions about your care and for scheduling assistance.     Calls are returned Monday through Friday between 8 AM and 4:30 PM. We usually get back to you within 2 business days depending on the issue/request.    If we are prescribing your medications:    For opioid medication refills, call the clinic or send a [employer] message 7 days in advance.  Please include:    Name of requested medication    Name of the pharmacy.    For non-opioid medications, call your pharmacy directly to request a refill. Please allow 3-4 days to be processed.     Per [place] State Law:    All controlled substance prescriptions must be filled within 30 days of being written.      For those controlled substances allowing refills, pickup must occur within 30 days of last fill.      We believe regular attendance is key to your success in our program!      Any time you are unable to keep your appointment we ask that you call us at least 24 hours in advance to cancel.This will allow us to offer the appointment time to another patient.     Multiple missed appointments may lead to dismissal from the clinic.

## 2020-09-23 NOTE — PROGRESS NOTES
"Lane Bosch is a 55 year old male who is being evaluated via a billable video visit.      The patient has been notified of following:     \"This video visit will be conducted via a call between you and your physician/provider. We have found that certain health care needs can be provided without the need for an in-person physical exam.  This service lets us provide the care you need with a video conversation.  If a prescription is necessary we can send it directly to your pharmacy.  If lab work is needed we can place an order for that and you can then stop by our lab to have the test done at a later time.    Video visits are billed at different rates depending on your insurance coverage.  Please reach out to your insurance provider with any questions.    If during the course of the call the physician/provider feels a video visit is not appropriate, you will not be charged for this service.\"    Patient has given verbal consent for Video visit? Yes  How would you like to obtain your AVS? [employer]  If you are dropped from the video visit, the video invite should be resent to: Contact through [employer]  Will anyone else be joining your video visit? No    Natalie Collins CMA ([facility])      Video-Visit Details    Type of service:  Video Visit    Video Start Time: 8  Video End Time: 830    Originating Location (pt. Location): Home    Distant Location (provider location):  [facility]     Platform used for Video Visit: Tawanda Cardona MD        "

## 2020-09-23 NOTE — PROGRESS NOTES
[facility] Consultation    Date of visit: 9/23/2020    Reason for consultation:    Primary Care Provider is Franc Mckeon.  Pain medications are being prescribed by pcp.    Please see the [facility] health questionnaire which the patient completed and reviewed with me in detail.    Chief Complaint:    No chief complaint on file.    MME prescribed prior to seeing patient:  Current MME:    Pain history:  Lane Bosch is a 55 year old male who first started having problems with pain feel his pain started a yr ago. He denies any specific inciting event. The pt reports he was in shower and felt a severe spasm. The pt has hx of of lami on left side 8 yrs ago .  Patient is unsure what level.  Unable to find any documentation of imaging in the chart.  He reports current symptoms are different than prior. At that time he had terrible pain in his buttocks and left ankle. The pt reports reports amazing benefit with the surgery. He reports he hasnt had much back pain. Currently the pt reports most of symptoms are at night and while being more active. The pain is currently spasms in his lower outer buttocks.  He notes occasional radiation to his lateral posterior thigh the pain is also sharp stabbing.  The pain is intermittent. The pt reports intermittent burning. Denies any numbness tingling or burning. In general minor radiation Occasionaly radiates to lat/post thigh.The pain is not as bad as herniated in disc.   The pt reports pain is worse with prolonged activity.   The pt reports benefit with massage.The pt reports benefit when rubbing with a lacrosse nerve. Worse when laying on his buttocks in bed. The pain is slightly worse with stretching in certain ways. Occasionally hurts with prolonged sitting. The pt feels his range of motion at his hip is worse. Feels 2/2to stiffness and pain. The pt reports he occasionally does piriformis stretches   He denies any recent falls. Denies  overt progressive weakness, but does feel over weakness.denies any incontinence.    He reports he is having issues sleeping     Overal sig affects quality of life    Sometime its unbarealbe         Pain rating: intensity  Averages 5/10 on a 0-10 scale.      Current treatments include:  n/a    Previous medication treatments included:  muslce relaxer    Other treatments have included:  Lane Bosch has not been seen at a pain clinic in the past.    PT: Not recently  Chiropractic: n  Acupuncture: n  TENs Unit: n  Injections: n    Past Medical History:  Past Medical History:   Diagnosis Date     Hypertension      Past Surgical History:  Past Surgical History:   Procedure Laterality Date     C CYSTOTOMY,REPAIR URETEROCELE  2015     HEMILAMINECTOMY, DISCECTOMY LUMBAR ONE LEVEL, COMBINED  12/13/2012     L4-5     Medications:  Current Outpatient Medications   Medication Sig Dispense Refill     lisinopril (ZESTRIL) 20 MG tablet Take 1 tablet (20 mg) by mouth daily 90 tablet 3     Allergies:   No Known Allergies  Social History:  Home situation: [facility]  Occupation/Schooling: y  Tobacco use: n  Alcohol use: n  Drug use: n  History of chemical dependency treatment: n    Family history:  Family History   Problem Relation Age of Onset     Prostate Cancer Father      Other Cancer Father         pancreatic     Prostate Cancer Paternal Grandfather      Other - See Comments Son         cleft palete     Family history ha no    Review of Systems:  Skin: negative  Eyes: negative  Ears/Nose/Throat: negative  Respiratory: No shortness of breath, dyspnea on exertion, cough, or hemoptysis  Cardiovascular: negative  Gastrointestinal: negative  Genitourinary: negative  Musculoskeletal: negative  Neurologic: negative  Psychiatric: negative  Hematologic/Lymphatic/Immunologic: negative  Endocrine: negative    Physical Exam:  There were no vitals filed for this visit.  Exam:  Constitutional: healthy, alert and no distress  Respiratory: Speaking in  full sentences no accessory muscles use     Psychiatric: mentation appears normal and affect normal/bright    Musculoskeletal exam:  Gait/Station/Posture: wnl           Lumbar spine:     ROM: wnl                 Straight leg exam: neg   JUAN CARLOS/FADIR: neg                 Motor: Able to easily overcome gravity  n/a      Diagnostic tests:  M            Assessment/Plan:  Lane Bosch is a 55 year old male who presents with the complaints of left-sided buttocks pain with occasional radiation.   Diagnoses and all orders for this visit:    Piriformis syndrome, left  -     PAIN MANAGEMENT REFERRAL         - Further procedures recommended:    - consider Left piriformis injection  - Medication Management:    - start robaxin 500mg BID prn   - Physical Therapy:    - order placed for [facility] PT  - Clinical Health Psychologist to address issues of relaxation, behavioral change, coping style, and other factors important to improvement: no  - Diagnostic Studies: Consider lumbar MRI if no improvement   - Urine toxicology screen today: no   - Follow up:    - 1 month virtual           Total time spent was 30 minutes    Cheng Cardona MD  [facility]  This note was created with voice recognition software, and while reviewed for accuracy, typos may remain.

## 2020-09-28 ENCOUNTER — ANESTHESIA EVENT (OUTPATIENT)
Dept: GASTROENTEROLOGY | Facility: CLINIC | Age: 55
End: 2020-09-28
Payer: COMMERCIAL

## 2020-09-28 NOTE — ANESTHESIA PREPROCEDURE EVALUATION
Anesthesia Pre-Procedure Evaluation    Patient: Lane Bosch   MRN: [id number] : 1965          Preoperative Diagnosis: Special screening for malignant neoplasms, colon [Z12.11]    Procedure(s):  COLONOSCOPY    Past Medical History:   Diagnosis Date     Hypertension      Past Surgical History:   Procedure Laterality Date     C CYSTOTOMY,REPAIR URETEROCELE       HEMILAMINECTOMY, DISCECTOMY LUMBAR ONE LEVEL, COMBINED  2012     L4-5       Anesthesia Evaluation     . Pt has had prior anesthetic. Type: General and MAC    History of anesthetic complications    Urinary retention      ROS/MED HX    ENT/Pulmonary:  - neg pulmonary ROS     Neurologic:  - neg neurologic ROS     Cardiovascular:     (+) hypertension----. : . . . :. .       METS/Exercise Tolerance:  >4 METS   Hematologic:  - neg hematologic  ROS       Musculoskeletal:  - neg musculoskeletal ROS       GI/Hepatic:  - neg GI/hepatic ROS       Renal/Genitourinary:  - ROS Renal section negative       Endo:  - neg endo ROS       Psychiatric:  - neg psychiatric ROS       Infectious Disease:  - neg infectious disease ROS       Malignancy:      - no malignancy   Other:    - neg other ROS                      Physical Exam  Normal systems: cardiovascular and pulmonary    Airway   Mallampati: II  TM distance: >3 FB  Neck ROM: full    Dental   (+) caps    Cardiovascular       Pulmonary             Lab Results   Component Value Date     2020    POTASSIUM 3.9 2020    CHLORIDE 104 2020    CO2 30 2020    BUN 11 2020    CR 0.75 2020    GLC 95 2020    ROHITH 8.8 2020    ALBUMIN 4.1 2017    PROTTOTAL 7.5 2017    ALT 35 2017    AST 27 2017    ALKPHOS 75 2017    BILITOTAL 0.6 2017       Preop Vitals  BP Readings from Last 3 Encounters:   20 126/78   10/30/19 118/62   19 (!) 172/98    Pulse Readings from Last 3 Encounters:   20 52   18 50   17 57     "  Resp Readings from Last 3 Encounters:   09/08/20 16    SpO2 Readings from Last 3 Encounters:   09/08/20 98%   11/16/18 98%   11/17/17 100%      Temp Readings from Last 1 Encounters:   09/08/20 36  C (96.8  F) (Tympanic)    Ht Readings from Last 1 Encounters:   09/08/20 1.753 m (5' 9\")      Wt Readings from Last 1 Encounters:   09/08/20 73.5 kg (162 lb)    Estimated body mass index is 23.92 kg/m  as calculated from the following:    Height as of 9/8/20: 1.753 m (5' 9\").    Weight as of 9/8/20: 73.5 kg (162 lb).       Anesthesia Plan      History & Physical Review      ASA Status:  2 .    NPO Status:  > 4 hours    Plan for MAC Reason for MAC:  Deep or markedly invasive procedure (G8)           Postoperative Care      Consents  Anesthetic plan, risks, benefits and alternatives discussed with:  Patient..                 Anshu Bonilla CRNA, APRN CRNA  "

## 2020-10-02 DIAGNOSIS — Z11.59 ENCOUNTER FOR SCREENING FOR OTHER VIRAL DISEASES: ICD-10-CM

## 2020-10-02 PROCEDURE — U0003 INFECTIOUS AGENT DETECTION BY NUCLEIC ACID (DNA OR RNA); SEVERE ACUTE RESPIRATORY SYNDROME CORONAVIRUS 2 (SARS-COV-2) (CORONAVIRUS DISEASE [COVID-19]), AMPLIFIED PROBE TECHNIQUE, MAKING USE OF HIGH THROUGHPUT TECHNOLOGIES AS DESCRIBED BY CMS-2020-01-R: HCPCS | Performed by: SURGERY

## 2020-10-03 LAB
SARS-COV-2 RNA SPEC QL NAA+PROBE: NOT DETECTED
SPECIMEN SOURCE: NORMAL

## 2020-10-05 ENCOUNTER — HOSPITAL ENCOUNTER (OUTPATIENT)
Facility: CLINIC | Age: 55
Discharge: HOME | End: 2020-10-05
Attending: SURGERY | Admitting: SURGERY
Payer: COMMERCIAL

## 2020-10-05 ENCOUNTER — ANESTHESIA (OUTPATIENT)
Dept: GASTROENTEROLOGY | Facility: CLINIC | Age: 55
End: 2020-10-05
Payer: COMMERCIAL

## 2020-10-05 VITALS
HEART RATE: 39 BPM | RESPIRATION RATE: 16 BRPM | HEIGHT: 69 IN | TEMPERATURE: 97.5 F | SYSTOLIC BLOOD PRESSURE: 135 MMHG | OXYGEN SATURATION: 100 % | BODY MASS INDEX: 22.86 KG/M2 | WEIGHT: 154.32 LBS | DIASTOLIC BLOOD PRESSURE: 84 MMHG

## 2020-10-05 LAB — COLONOSCOPY: NORMAL

## 2020-10-05 PROCEDURE — 45380 COLONOSCOPY AND BIOPSY: CPT | Mod: PT | Performed by: SURGERY

## 2020-10-05 PROCEDURE — 258N000003 HC RX IP 258 OP 636: Performed by: SURGERY

## 2020-10-05 PROCEDURE — 88305 TISSUE EXAM BY PATHOLOGIST: CPT | Mod: 26 | Performed by: PATHOLOGY

## 2020-10-05 PROCEDURE — 250N000009 HC RX 250: Performed by: NURSE ANESTHETIST, CERTIFIED REGISTERED

## 2020-10-05 PROCEDURE — 45380 COLONOSCOPY AND BIOPSY: CPT | Performed by: SURGERY

## 2020-10-05 PROCEDURE — 88305 TISSUE EXAM BY PATHOLOGIST: CPT | Mod: TC | Performed by: SURGERY

## 2020-10-05 PROCEDURE — 370N000001 HC ANESTHESIA TECHNICAL FEE, 1ST 30 MIN: Performed by: SURGERY

## 2020-10-05 PROCEDURE — 250N000009 HC RX 250: Performed by: SURGERY

## 2020-10-05 PROCEDURE — 250N000011 HC RX IP 250 OP 636: Performed by: NURSE ANESTHETIST, CERTIFIED REGISTERED

## 2020-10-05 RX ORDER — GLYCOPYRROLATE 0.2 MG/ML
INJECTION, SOLUTION INTRAMUSCULAR; INTRAVENOUS PRN
Status: DISCONTINUED | OUTPATIENT
Start: 2020-10-05 | End: 2020-10-05

## 2020-10-05 RX ORDER — PROPOFOL 10 MG/ML
INJECTION, EMULSION INTRAVENOUS CONTINUOUS PRN
Status: DISCONTINUED | OUTPATIENT
Start: 2020-10-05 | End: 2020-10-05

## 2020-10-05 RX ORDER — SODIUM CHLORIDE, SODIUM LACTATE, POTASSIUM CHLORIDE, CALCIUM CHLORIDE 600; 310; 30; 20 MG/100ML; MG/100ML; MG/100ML; MG/100ML
INJECTION, SOLUTION INTRAVENOUS CONTINUOUS
Status: DISCONTINUED | OUTPATIENT
Start: 2020-10-05 | End: 2020-10-05 | Stop reason: HOSPADM

## 2020-10-05 RX ORDER — LIDOCAINE 40 MG/G
CREAM TOPICAL
Status: DISCONTINUED | OUTPATIENT
Start: 2020-10-05 | End: 2020-10-05 | Stop reason: HOSPADM

## 2020-10-05 RX ORDER — LIDOCAINE HYDROCHLORIDE 10 MG/ML
INJECTION, SOLUTION EPIDURAL; INFILTRATION; INTRACAUDAL; PERINEURAL PRN
Status: DISCONTINUED | OUTPATIENT
Start: 2020-10-05 | End: 2020-10-05

## 2020-10-05 RX ORDER — PROPOFOL 10 MG/ML
INJECTION, EMULSION INTRAVENOUS PRN
Status: DISCONTINUED | OUTPATIENT
Start: 2020-10-05 | End: 2020-10-05

## 2020-10-05 RX ADMIN — PROPOFOL 200 MCG/KG/MIN: 10 INJECTION, EMULSION INTRAVENOUS at 07:32

## 2020-10-05 RX ADMIN — LIDOCAINE HYDROCHLORIDE 50 MG: 10 INJECTION, SOLUTION EPIDURAL; INFILTRATION; INTRACAUDAL; PERINEURAL at 07:32

## 2020-10-05 RX ADMIN — PROPOFOL 100 MG: 10 INJECTION, EMULSION INTRAVENOUS at 07:32

## 2020-10-05 RX ADMIN — GLYCOPYRROLATE 0.2 MG: 0.2 INJECTION, SOLUTION INTRAMUSCULAR; INTRAVENOUS at 07:32

## 2020-10-05 RX ADMIN — LIDOCAINE HYDROCHLORIDE 0.1 ML: 10 INJECTION, SOLUTION EPIDURAL; INFILTRATION; INTRACAUDAL; PERINEURAL at 07:00

## 2020-10-05 RX ADMIN — SODIUM CHLORIDE, POTASSIUM CHLORIDE, SODIUM LACTATE AND CALCIUM CHLORIDE 1000 ML: 600; 310; 30; 20 INJECTION, SOLUTION INTRAVENOUS at 06:59

## 2020-10-05 ASSESSMENT — MIFFLIN-ST. JEOR: SCORE: 1515.38

## 2020-10-05 NOTE — LETTER
Lane Bosch  [address] [facility] [address] [facility] [address]      October 12, 2020    Dear Lane,  This letter is written to inform you of the results of your recent colonoscopy.  Your examination showed polyp(s) in your rectum. All polyps were removed in their entirety and sent for review by a pathologist. As you will see on the pathology report below, the tissue(s) were hyperplastic polyps. Your examination was otherwise without abnormality.    SPECIMEN(S):   Rectal polyp     FINAL DIAGNOSIS:   Rectum, polyp, polypectomy   - Hyperplastic polyp.   - No evidence of dysplasia or malignancy.     Hyperplastic polyps are entirely benign (non-cancerous) and rarely associated with the development of additional polyps or colorectal cancer.    Given these findings, I recommend that you undergo a repeat colonoscopy in ten years for screening. We will enter you into a recall system so you receive a reminder closer to the time that you are due for repeat examination.     Please remember that this recommendation is made with the understanding that you are not experiencing persistent changes in bowel function, bleeding per rectum, and/or significant abdominal pain. If you experience these symptoms, please contact your primary care provider for a further evaluation.     If you have any questions or concerns about the results of your colonoscopy or the appropriate follow-up, please contact my assistant at [phone]    Sincerely,        [facility],   [facility]  ___

## 2020-10-05 NOTE — INTERVAL H&P NOTE
The History and Physical has been reviewed, the patient has been examined and no changes have occurred in the patient's condition since the H & P was completed.     1st colonoscopy was 7 years in [place] - (normal at the time); htn; no abdominal surgery; no colon cancer    [facility] DO

## 2020-10-05 NOTE — ANESTHESIA POSTPROCEDURE EVALUATION
Patient: Lane Bosch    Procedure(s):  COLONOSCOPY, WITH POLYPECTOMY AND BIOPSY    Diagnosis:Special screening for malignant neoplasms, colon [Z12.11]  Diagnosis Additional Information: No value filed.    Anesthesia Type:  MAC    Note:  Anesthesia Post Evaluation    Patient location during evaluation: Bedside  Patient participation: Able to fully participate in evaluation  Level of consciousness: awake and alert  Pain management: adequate  Airway patency: patent  Cardiovascular status: acceptable  Respiratory status: acceptable  Hydration status: acceptable  PONV: none     Anesthetic complications: None          Last vitals:  Vitals:    10/05/20 0633   BP: 135/82   Pulse: 53   Resp: 16   Temp: 36.4  C (97.5  F)   SpO2: 99%         Electronically Signed By: MARCELA Hansen CRNA  October 5, 2020  7:56 AM

## 2020-10-06 ENCOUNTER — HOSPITAL ENCOUNTER (OUTPATIENT)
Dept: PHYSICAL THERAPY | Facility: CLINIC | Age: 55
Setting detail: THERAPIES SERIES
End: 2020-10-06
Attending: PAIN MEDICINE
Payer: COMMERCIAL

## 2020-10-06 DIAGNOSIS — G57.02 PIRIFORMIS SYNDROME, LEFT: ICD-10-CM

## 2020-10-06 LAB — COPATH REPORT: NORMAL

## 2020-10-06 PROCEDURE — 97140 MANUAL THERAPY 1/> REGIONS: CPT | Mod: GP | Performed by: PHYSICAL THERAPIST

## 2020-10-06 PROCEDURE — 97161 PT EVAL LOW COMPLEX 20 MIN: CPT | Mod: GP | Performed by: PHYSICAL THERAPIST

## 2020-10-06 PROCEDURE — 97162 PT EVAL MOD COMPLEX 30 MIN: CPT | Mod: GP | Performed by: PHYSICAL THERAPIST

## 2020-10-06 PROCEDURE — 97110 THERAPEUTIC EXERCISES: CPT | Mod: GP | Performed by: PHYSICAL THERAPIST

## 2020-10-08 NOTE — PROGRESS NOTES
10/06/20 1300   General Information   Type of Visit Initial OP Ortho PT Evaluation   Start of Care Date 10/06/20   Referring Physician Dr. Cardona   Patient/Family Goals Statement decrease pain, increased ROM in the hip    Orders Evaluate and Treat   Date of Order 09/23/20   Certification Required? Yes   Medical Diagnosis Piriformis syndrome, left    Surgical/Medical history reviewed Yes   Precautions/Limitations no known precautions/limitations   Body Part(s)   Body Part(s) Hip   Presentation and Etiology   Pertinent history of current problem (include personal factors and/or comorbidities that impact the POC) States that symptoms have been going on for about a year. Has limited ROM in the hip. Hx of laminectomy 10 years ago and limped on the L side for a long time, wondering if thi contributes. Started taking Robaxim which has really helped with the pain at night. Pain located in primarily the glute, sometimes will radiation to thigh.    Impairments A. Pain;D. Decreased ROM   Functional Limitations perform activities of daily living;perform required work activities;perform desired leisure / sports activities   Symptom Location L posterior hip   How/Where did it occur From insidious onset   Onset date of current episode/exacerbation 10/06/19   Chronicity Chronic   Pain rating (0-10 point scale) Best (/10);Worst (/10)   Best (/10) 0   Worst (/10) 8   Pain quality D. Burning;E. Shooting;G. Cramping   Frequency of pain/symptoms D. Other   Pain frequency comment sleeping   Pain/symptoms exacerbated by M. Other;I. Bending;C. Lifting;A. Sitting   Pain exacerbation comment running, skatings, swimming   Pain/symptoms eased by H. Cold;K. Other   Pain eased by comment medications   Progression of symptoms since onset: Unchanged  (improved with the muscle relaxer )   Prior Level of Function   Prior Level of Function-Mobility independent   Prior Level of Function-ADLs independent   Current Level of Function   Patient  role/employment history A. Employed   Employment Comments self-employed, statitician.    Fall Risk Screen   Fall screen completed by PT   Have you fallen 2 or more times in the past year? No   Have you fallen and had an injury in the past year? No   Is patient a fall risk? No   Functional Scales   Functional Scales Other   Other Scales  LEFS: 69/80   Hip Objective Findings   Side (if bilateral, select both right and left) Right;Left   Gait/Locomotion normal   Lumbar ROM Flexion-fingertips to floor. Extension-trace. SB fingertips to mid thigh. No pain. Pt reports he has always been stiff.    Pelvic Screen + supine to sit    Functional Closed Chain Screen DL squat - limited L hip flexion w/pain   Femoral Nerve Stretch Test -   Gluteal Tendopathy Test -   Straight Leg Raise Test -   Scour Test + L for groin pain   JUAN CARLOS Test + L for groin pain   FADIR Test + L for groin pain   Palpation tender L piriformis and glutes    Accessory Motion/Joint Mobility hypomobile L hip joint    Right Hip Flexion PROM 115   Right Hip Abduction PROM 40   Right Hip ER PROM 45   Right Hip IR PROM 20   Right Hip Ext PROM WNL   Right Hip Flexion Strength 5   Right Hip Abduction Strength 5   Right Hip IR Strength 5   Right Hip ER Strength 5   Right Knee Flexion Strength 5   Right Knee Extension Strength 5   Tony Flexibility Test very tight L hip   Right Hamstring Flexibility tight   Right Piriformis Flexibility tight   Right Prone Quad Flexibility tight   Left Hip Flexion PROM 100, pain anterior hip   Left Hip Abduction PROM 30   Left Hip ER PROM 30   Left Hip IR PROM 0   Left Hip Ext PROM limited    Left Hip Flexion Strength 5   Left Hip Abduction Strength 5   Left Hip IR Strength 5   Left Hip ER Strength 5   Left Knee Flexion Strength 5   Left Knee Extension Strength 5   Left Hamstring Flexibility tight   Left Piriformis Flexibility tight   Left Prone Quad Flexibility tight   Planned Therapy Interventions   Planned Therapy Interventions  balance training;gait training;joint mobilization;manual therapy;neuromuscular re-education;ROM;strengthening;stretching   Clinical Impression   Criteria for Skilled Therapeutic Interventions Met yes, treatment indicated   PT Diagnosis L posterior hip pain   Influenced by the following impairments pain, decreased ROM, decreased flexibility, decreased strength   Functional limitations due to impairments sleeping, squatting, lifting, running/swimming   Clinical Presentation Stable/Uncomplicated   Clinical Presentation Rationale sx stable   Clinical Decision Making (Complexity) Low complexity   Therapy Frequency 1 time/week   Predicted Duration of Therapy Intervention (days/wks) 8 weeks   Risk & Benefits of therapy have been explained Yes   Patient, Family & other staff in agreement with plan of care Yes   Education Assessment   Preferred Learning Style Listening;Demonstration;Pictures/video   Barriers to Learning No barriers   ORTHO GOALS   PT Ortho Eval Goals 1;2;3;4   Ortho Goal 1   Goal Identifier 1   Goal Description Patient will be able to perform lifting with only minimal difficulty.    Target Date 12/03/20   Ortho Goal 2   Goal Identifier 2   Goal Description Patient will be able to perfrom squatting with only minimal difficulty.    Target Date 12/03/20   Ortho Goal 3   Goal Identifier 3   Goal Description Patient will report reduced pain with sleeping.    Target Date 12/03/20   Ortho Goal 4   Goal Identifier 4   Goal Description Patient will be independent with HEP to aid functional recovery.    Target Date 12/03/20   Total Evaluation Time   PT Ruby Low Complexity Minutes   (54597) 30   Therapy Certification   Certification date from 10/06/20   Certification date to 12/03/20   Medical Diagnosis Piriformis syndrome, left        Please contact me with any questions or concerns.  Thank you for your referral.    Lay Shanks, PT, DPT, OCS  Physical Therapist, Orthopedic Certified Specialist    [facility]  [facility]  [address] [address]  [email or web address]   Office: [phone]   Employed by [employer] [facility] [employer]

## 2020-10-08 NOTE — PROGRESS NOTES
[facility]          OUTPATIENT PHYSICAL THERAPY ORTHOPEDIC EVALUATION  PLAN OF TREATMENT FOR OUTPATIENT REHABILITATION  (COMPLETE FOR INITIAL CLAIMS ONLY)  Patient's Last Name, First Name, M.I.  YOB: 1965  Lane Bosch       Provider s Name:  [facility]   Medical Record No.  [id number]   Start of Care Date:  10/06/20   Onset Date:  10/06/19   Type:     _X__PT   ___OT   ___SLP Medical Diagnosis:  Piriformis syndrome, left      PT Diagnosis:  L posterior hip pain   Visits from SOC:  1      _________________________________________________________________________________  Plan of Treatment/Functional Goals:  balance training, gait training, joint mobilization, manual therapy, neuromuscular re-education, ROM, strengthening, stretching           Goals  Goal Identifier: 1  Goal Description: Patient will be able to perform lifting with only minimal difficulty.   Target Date: 12/03/20    Goal Identifier: 2  Goal Description: Patient will be able to perfrom squatting with only minimal difficulty.   Target Date: 12/03/20    Goal Identifier: 3  Goal Description: Patient will report reduced pain with sleeping.   Target Date: 12/03/20    Goal Identifier: 4  Goal Description: Patient will be independent with HEP to aid functional recovery.   Target Date: 12/03/20                             Therapy Frequency:  1 time/week  Predicted Duration of Therapy Intervention:  8 weeks    Lay Shanks, PT                 I CERTIFY THE NEED FOR THESE SERVICES FURNISHED UNDER        THIS PLAN OF TREATMENT AND WHILE UNDER MY CARE     (Physician co-signature of this document indicates review and certification of the therapy plan).                       Certification Date From:  10/06/20   Certification Date To:  12/03/20    Referring Provider:  Dr. Cardona    Initial Assessment        See Epic Evaluation Start of Care Date: 10/06/20

## 2020-10-26 ENCOUNTER — VIRTUAL VISIT (OUTPATIENT)
Dept: PALLIATIVE MEDICINE | Facility: CLINIC | Age: 55
End: 2020-10-26
Payer: COMMERCIAL

## 2020-10-26 DIAGNOSIS — G57.02 PIRIFORMIS SYNDROME, LEFT: Primary | ICD-10-CM

## 2020-10-26 PROCEDURE — 99213 OFFICE O/P EST LOW 20 MIN: CPT | Mod: 95 | Performed by: PAIN MEDICINE

## 2020-10-26 ASSESSMENT — PAIN SCALES - GENERAL: PAINLEVEL: NO PAIN (0)

## 2020-10-26 NOTE — PROGRESS NOTES
"Lane Bosch is a 55 year old male who is being evaluated via a billable video visit.      The patient has been notified of following:     \"This video visit will be conducted via a call between you and your physician/provider. We have found that certain health care needs can be provided without the need for an in-person physical exam.  This service lets us provide the care you need with a video conversation.  If a prescription is necessary we can send it directly to your pharmacy.  If lab work is needed we can place an order for that and you can then stop by our lab to have the test done at a later time.    Video visits are billed at different rates depending on your insurance coverage.  Please reach out to your insurance provider with any questions.    If during the course of the call the physician/provider feels a video visit is not appropriate, you will not be charged for this service.\"    Patient has given verbal consent for Video visit? Yes  How would you like to obtain your AVS? MyChart  If you are dropped from the video visit, the video invite should be resent to: [phone]  Will anyone else be joining your video visit? No       Moose Carpio MA        Video-Visit Details    Type of service:  Video Visit    Video Start Time: 2  Video End Time: 210    Originating Location (pt. Location): Home    Distant Location (provider location):  [facility] [place]     Platform used for Video Visit: Tawanda Cardona MD        [facility] f/u        Reason for consultation:    Primary Care Provider is Franc Mckeon.  Pain medications are being prescribed by pcp.    Please see the [facility] [place] health questionnaire which the patient completed and reviewed with me in detail.    Chief Complaint:    Chief Complaint   Patient presents with     Pain     Video visit due to COVID-19      MME prescribed prior to seeing patient:  Current MME:    rec    - " Further procedures recommended:    - consider Left piriformis injection  - Medication Management:    - start robaxin 500mg BID prn   - Physical Therapy:    - order placed for [facility] PT  - Clinical Health Psychologist to address issues of relaxation, behavioral change, coping style, and other factors important to improvement: no  - Diagnostic Studies: Consider lumbar MRI if no improvement   - Urine toxicology screen today: no   - Follow up:     Interval  Patient reports significant benefit since starting Robaxin.  Additionally, he has been very active in his physical therapy.  Patient continues to do his stretches.  I discussed with him the importance of continuing to do his PT.  The patient reports he has been more active of late.  He notes sometimes he may push himself too hard which can exacerbate his symptoms.  Patient reports that he has been sleeping considerably better with Robaxin, which he feels has also helped his pain symptoms.  Patient denies any significant side effects from the medications.  The patient reports significant improvement in his quality of life.  Of note patient was initially taking twice daily, but he is gradually titrated down his dosage.  He reports he no longer takes every night scheduled but rather only when symptoms are more present.  Overall he feels symptoms are currently tolerable.  He denies any recent falls.  He denies any overt weakness.  He denies any new symptoms.      Pain history:  Lane Bosch is a 55 year old male who first started having problems with pain feel his pain started a yr ago. He denies any specific inciting event. The pt reports he was in shower and felt a severe spasm. The pt has hx of of lami on left side 8 yrs ago .  Patient is unsure what level.  Unable to find any documentation of imaging in the chart.  He reports current symptoms are different than prior. At that time he had terrible pain in his buttocks and left ankle. The pt reports reports amazing  benefit with the surgery. He reports he hasnt had much back pain. Currently the pt reports most of symptoms are at night and while being more active. The pain is currently spasms in his lower outer buttocks.  He notes occasional radiation to his lateral posterior thigh the pain is also sharp stabbing.  The pain is intermittent. The pt reports intermittent burning. Denies any numbness tingling or burning. In general minor radiation Occasionaly radiates to lat/post thigh.The pain is not as bad as herniated in disc.   The pt reports pain is worse with prolonged activity.   The pt reports benefit with massage.The pt reports benefit when rubbing with a lacrosse nerve. Worse when laying on his buttocks in bed. The pain is slightly worse with stretching in certain ways. Occasionally hurts with prolonged sitting. The pt feels his range of motion at his hip is worse. Feels 2/2to stiffness and pain. The pt reports he occasionally does piriformis stretches   He denies any recent falls. Denies overt progressive weakness, but does feel over weakness.denies any incontinence.    He reports he is having issues sleeping     Overal sig affects quality of life    Sometime its unbarealbe         Pain rating: intensity  Averages 5/10 on a 0-10 scale.      Current treatments include:  robaxin    Previous medication treatments included:  muslce relaxer    Other treatments have included:  Lane Bosch has not been seen at a pain clinic in the past.    PT: Not recently  Chiropractic: n  Acupuncture: n  TENs Unit: n  Injections: n    Past Medical History:  Past Medical History:   Diagnosis Date     Hypertension      Past Surgical History:  Past Surgical History:   Procedure Laterality Date     C CYSTOTOMY,REPAIR URETEROCELE  2015     COLONOSCOPY N/A 10/5/2020    Procedure: COLONOSCOPY, WITH POLYPECTOMY AND BIOPSY;  Surgeon: Jackie Ruiz MD;  Location: [facility]     HEMILAMINECTOMY, DISCECTOMY LUMBAR ONE LEVEL, COMBINED  12/13/2012     L4-5      Medications:  Current Outpatient Medications   Medication Sig Dispense Refill     lisinopril (ZESTRIL) 20 MG tablet Take 1 tablet (20 mg) by mouth daily 90 tablet 3     methocarbamol (ROBAXIN) 500 MG tablet Take 1 tablet (500 mg) by mouth 2 times daily as needed for muscle spasms 60 tablet 1     Allergies:   No Known Allergies  Social History:  Home situation: [facility]  Occupation/Schooling: y  Tobacco use: n  Alcohol use: n  Drug use: n  History of chemical dependency treatment: n    Family history:  Family History   Problem Relation Age of Onset     Prostate Cancer Father      Other Cancer Father         pancreatic     Prostate Cancer Paternal Grandfather      Other - See Comments Son         cleft palete     Family history ha no    Review of Systems:  Skin: negative  Eyes: negative  Ears/Nose/Throat: negative  Respiratory: No shortness of breath, dyspnea on exertion, cough, or hemoptysis  Cardiovascular: negative  Gastrointestinal: negative  Genitourinary: negative  Musculoskeletal: negative  Neurologic: negative  Psychiatric: negative  Hematologic/Lymphatic/Immunologic: negative  Endocrine: negative    Physical Exam:  There were no vitals filed for this visit.  Exam:  Constitutional: healthy, alert and no distress  Respiratory: Speaking in full sentences no accessory muscles use     Psychiatric: mentation appears normal and affect normal/bright    Musculoskeletal exam:  Gait/Station/Posture: wnl      Motor: Able to easily overcome gravity  n/a      Diagnostic tests:  M            Assessment/Plan:  Lane Bosch is a 55 year old male who presents with the complaints of left-sided buttocks pain with occasional radiation.   Lane was seen today for pain.    Diagnoses and all orders for this visit:    Piriformis syndrome, left         - Further procedures recommended:    -Would not recommend a piriformis injection at this time.  - Medication Management:    -Continue robaxin 500mg BID prn   - Physical Therapy:     -Continue home stretches  - Follow up:    -As needed          Total time spent was 10 minutes    Cheng Cardona MD  [facility] [place] [facility]  This note was created with voice recognition software, and while reviewed for accuracy, typos may remain.

## 2020-11-30 ENCOUNTER — TELEPHONE (OUTPATIENT)
Dept: DERMATOLOGY | Facility: CLINIC | Age: 55
End: 2020-11-30

## 2020-11-30 ENCOUNTER — MYC REFILL (OUTPATIENT)
Dept: PALLIATIVE MEDICINE | Facility: CLINIC | Age: 55
End: 2020-11-30

## 2020-11-30 DIAGNOSIS — G57.02 PIRIFORMIS SYNDROME, LEFT: ICD-10-CM

## 2020-11-30 RX ORDER — METHOCARBAMOL 500 MG/1
500 TABLET, FILM COATED ORAL 2 TIMES DAILY PRN
Qty: 60 TABLET | Refills: 1 | Status: SHIPPED | OUTPATIENT
Start: 2020-11-30 | End: 2021-03-02

## 2020-11-30 RX ORDER — METHOCARBAMOL 500 MG/1
500 TABLET, FILM COATED ORAL 2 TIMES DAILY PRN
Qty: 60 TABLET | Refills: 1 | Status: CANCELLED | OUTPATIENT
Start: 2020-11-30

## 2020-11-30 NOTE — TELEPHONE ENCOUNTER
Received request from patient requesting refill(s) for methocarbamol (ROBAXIN) 500 MG tablet    Last refilled on 10/16/20    Pt last seen on 10/26/20  Next appt scheduled for : none    Will facilitate refill.

## 2021-03-02 DIAGNOSIS — G57.02 PIRIFORMIS SYNDROME, LEFT: ICD-10-CM

## 2021-03-02 RX ORDER — METHOCARBAMOL 500 MG/1
500 TABLET, FILM COATED ORAL 2 TIMES DAILY PRN
Qty: 60 TABLET | Refills: 1 | Status: SHIPPED | OUTPATIENT
Start: 2021-03-02 | End: 2021-05-26

## 2021-04-25 ENCOUNTER — HEALTH MAINTENANCE LETTER (OUTPATIENT)
Age: 56
End: 2021-04-25

## 2021-05-10 ENCOUNTER — MYC MEDICAL ADVICE (OUTPATIENT)
Dept: FAMILY MEDICINE | Facility: CLINIC | Age: 56
End: 2021-05-10

## 2021-05-10 DIAGNOSIS — S09.93XA INJURY OF TONGUE, INITIAL ENCOUNTER: Primary | ICD-10-CM

## 2021-05-10 NOTE — TELEPHONE ENCOUNTER
Please ask ENT on call to check out this MyChart message and picture to make a recommendation on if, when, where he should be seen?    Thank you,  Franc Mckeon MD

## 2021-05-10 NOTE — TELEPHONE ENCOUNTER
FP requesting ENT to review Viddyad message. Patient has injured tongue, with tooth @ 8 days ago. Concerned with the way it is healing. Image attached.     No openings this week. Okay to see next week or other suggestion?     Thank you,   Fuentes BILLY RN   Specialty Clinics

## 2021-05-10 NOTE — TELEPHONE ENCOUNTER
Reviewed by Dr. Nowak- his recommendations:     My recommendation would be to stick to a soft diet and continue to let it heal.  These things need to be repaired immediately.  After about 24 to 48 hours, we let them heal by secondary intention (without sutures or closure).  Takes about 10 to 21 days for healing.  The bump is normal part of the healing process as the tongue tries to fill in the tissue loss.  Sometimes there is a persistent area of heaped tissue while its healing.  The best thing would be for observation and if the symptoms are persistent past a few weeks, we could excise the area if it was still problematic.     The best thing at this point would be to stick to a soft diet and keep the area clean.  Salt and baking soda rinses a few times a day to keep the mouth clean is also reasonable.     Message sent to patient. Routed to PCP RN as FYI of advise given.     Fuentes BILLY RN   Specialty Clinics

## 2021-05-10 NOTE — TELEPHONE ENCOUNTER
Will send to specialty clinic to review and advise.    Dr. Mckeon would like ENT to review mychart message and advise.    Thank you    Ysabel GASTON RN

## 2021-05-13 ENCOUNTER — MYC MEDICAL ADVICE (OUTPATIENT)
Dept: PALLIATIVE MEDICINE | Facility: CLINIC | Age: 56
End: 2021-05-13

## 2021-05-13 NOTE — PROGRESS NOTES
Chief Complaint   Patient presents with     Consult     bump on tongue from biting it on May 2nd.     History of Present Illness   Lane Bosch is a 56 year old male who presents today for evaluation.  I am seeing this patient in consultation for injury of tongue at the request of the provider Dr. Mckeon.  The patient presents with a lesion on the right dorsal oral tongue.  The patient bit his tongue on May 2 and had an open laceration in the area.  This did not heal well initially and started to have a hypertrophic lump in the area.  This is no longer painful.  The bump does bother him some.  No bleeding from the area.  He is a non-smoker.  No neck lumps/bumps/swelling.    Past Medical History  Patient Active Problem List   Diagnosis     Benign essential hypertension     Actinic keratosis     Advanced directives, counseling/discussion     Current Medications     Current Outpatient Medications:      lisinopril (ZESTRIL) 20 MG tablet, Take 1 tablet (20 mg) by mouth daily, Disp: 90 tablet, Rfl: 3     methocarbamol (ROBAXIN) 500 MG tablet, Take 1 tablet (500 mg) by mouth 2 times daily as needed for muscle spasms, Disp: 60 tablet, Rfl: 1     triamcinolone (KENALOG) 0.1 % paste, Take by mouth 2 times daily, Disp: 30 g, Rfl: 1    Allergies  No Known Allergies    Social History   Social History     Socioeconomic History     Marital status:      Spouse name: Not on file     Number of children: Not on file     Years of education: Not on file     Highest education level: Not on file   Occupational History     Not on file   Social Needs     Financial resource strain: Not on file     Food insecurity     Worry: Not on file     Inability: Not on file     Transportation needs     Medical: Not on file     Non-medical: Not on file   Tobacco Use     Smoking status: Never Smoker     Smokeless tobacco: Never Used   Substance and Sexual Activity     Alcohol use: No     Drug use: No     Sexual activity: Yes     Partners: Female      Birth control/protection: Female Surgical   Lifestyle     Physical activity     Days per week: Not on file     Minutes per session: Not on file     Stress: Not on file   Relationships     Social connections     Talks on phone: Not on file     Gets together: Not on file     Attends Advent service: Not on file     Active member of club or organization: Not on file     Attends meetings of clubs or organizations: Not on file     Relationship status: Not on file     Intimate partner violence     Fear of current or ex partner: Not on file     Emotionally abused: Not on file     Physically abused: Not on file     Forced sexual activity: Not on file   Other Topics Concern     Parent/sibling w/ CABG, MI or angioplasty before 65F 55M? No   Social History Narrative     Not on file       Family History  Family History   Problem Relation Age of Onset     Prostate Cancer Father      Other Cancer Father         pancreatic     Prostate Cancer Paternal Grandfather      Other - See Comments Son         cleft palete       Review of Systems  As per HPI and PMHx, otherwise 10+ comprehensive system review is negative.    Physical Exam  /74   Pulse 63   Temp 97.7  F (36.5  C)   GENERAL: Patient is a pleasant, cooperative 56 year old male in no acute distress.  HEAD: Normocephalic, atraumatic.  Hair and scalp are normal.  EYES: Pupils are equal, round, reactive to light and accommodation.  Extraocular movements are intact.  The sclera nonicteric without injection.  The extraocular structures are normal.  EARS: Normal shape and symmetry.  No tenderness when palpating the mastoid or tragal areas bilaterally.     NOSE: Nares are patent.  Nasal mucosa is pink and moist.  Negative anterior rhinoscopy.  ORAL CAVITY: Dentition is in good repair.  Mucous membranes are moist.  Examination of the oral cavity showed a 5 mm lesion located on the right anterior tongue on the dorsal portion adjacent to the laceration is healing.  There is  no open lesion or ulceration.  There is no other tongue masses or lesions.  Noadditional oral cavity or oropharyngeal masses, lesions, ulcerations, leukoplakia.  NECK: Supple, trachea is midline.  There no palpable cervical lymphadenopathy or masses bilaterally.     NEUROLOGIC: Cranial nerves II through XII are grossly intact.  Voice is strong.  Patient is House-Brackmann I/VI bilaterally.  CARDIOVASCULAR: Extremities are warm and well-perfused.  No significant peripheral edema.  RESPIRATORY: Patient has nonlabored breathing without cough, wheeze, stridor.  PSYCHIATRIC: Patient is alert and oriented.  Mood and affect appear normal.  SKIN: Warm and dry.  No scalp, face, or neck lesions noted.    Assessment and Plan     ICD-10-CM    1. Tongue injury, sequela  S09.93XS triamcinolone (KENALOG) 0.1 % paste   2. Fibroma of tongue  D10.1 triamcinolone (KENALOG) 0.1 % paste     It was my pleasure seeing Lane Bosch today in clinic.  The patient presents today with post injury fibroma after tongue laceration that was healing by secondary intention.  He is developed a bit of a fibroma adjacent to the laceration.  At this point time, I would recommend this with Orabase for approximately two weeks to see if he has any improvement. I would like to see the patient back in 2-4 weeks to make sure it heals. If not, we could proceed with excisional biopsy in the office to help close and heal the lesion on the tongue.      Heriberto Nowak MD  Department of Otolarygology-Head and Neck Surgery  [facility]

## 2021-05-17 ENCOUNTER — OFFICE VISIT (OUTPATIENT)
Dept: OTOLARYNGOLOGY | Facility: CLINIC | Age: 56
End: 2021-05-17
Payer: COMMERCIAL

## 2021-05-17 VITALS — DIASTOLIC BLOOD PRESSURE: 74 MMHG | HEART RATE: 63 BPM | SYSTOLIC BLOOD PRESSURE: 114 MMHG | TEMPERATURE: 97.7 F

## 2021-05-17 DIAGNOSIS — S09.93XS: Primary | ICD-10-CM

## 2021-05-17 DIAGNOSIS — D10.1 FIBROMA OF TONGUE: ICD-10-CM

## 2021-05-17 PROCEDURE — 99213 OFFICE O/P EST LOW 20 MIN: CPT | Performed by: OTOLARYNGOLOGY

## 2021-05-17 RX ORDER — TRIAMCINOLONE ACETONIDE 0.1 %
PASTE (GRAM) DENTAL 2 TIMES DAILY
Qty: 30 G | Refills: 1 | Status: SHIPPED | OUTPATIENT
Start: 2021-05-17 | End: 2022-02-22

## 2021-05-17 NOTE — LETTER
5/17/2021         RE: Lane Bosch  [address] [facility] [address]        Dear Colleague,    Thank you for referring your patient, Lane Bosch, to the [facility]. Please see a copy of my visit note below.    Chief Complaint   Patient presents with     Consult     bump on tongue from biting it on May 2nd.     History of Present Illness   Lane Bosch is a 56 year old male who presents today for evaluation.  I am seeing this patient in consultation for injury of tongue at the request of the provider Dr. Mckeon.  The patient presents with a lesion on the right dorsal oral tongue.  The patient bit his tongue on May 2 and had an open laceration in the area.  This did not heal well initially and started to have a hypertrophic lump in the area.  This is no longer painful.  The bump does bother him some.  No bleeding from the area.  He is a non-smoker.  No neck lumps/bumps/swelling.    Past Medical History  Patient Active Problem List   Diagnosis     Benign essential hypertension     Actinic keratosis     Advanced directives, counseling/discussion     Current Medications     Current Outpatient Medications:      lisinopril (ZESTRIL) 20 MG tablet, Take 1 tablet (20 mg) by mouth daily, Disp: 90 tablet, Rfl: 3     methocarbamol (ROBAXIN) 500 MG tablet, Take 1 tablet (500 mg) by mouth 2 times daily as needed for muscle spasms, Disp: 60 tablet, Rfl: 1     triamcinolone (KENALOG) 0.1 % paste, Take by mouth 2 times daily, Disp: 30 g, Rfl: 1    Allergies  No Known Allergies    Social History   Social History     Socioeconomic History     Marital status:      Spouse name: Not on file     Number of children: Not on file     Years of education: Not on file     Highest education level: Not on file   Occupational History     Not on file   Social Needs     Financial resource strain: Not on file     Food insecurity     Worry: Not on file     Inability: Not on file     Transportation  needs     Medical: Not on file     Non-medical: Not on file   Tobacco Use     Smoking status: Never Smoker     Smokeless tobacco: Never Used   Substance and Sexual Activity     Alcohol use: No     Drug use: No     Sexual activity: Yes     Partners: Female     Birth control/protection: Female Surgical   Lifestyle     Physical activity     Days per week: Not on file     Minutes per session: Not on file     Stress: Not on file   Relationships     Social connections     Talks on phone: Not on file     Gets together: Not on file     Attends Ten[employer] service: Not on file     Active member of club or organization: Not on file     Attends meetings of clubs or organizations: Not on file     Relationship status: Not on file     Intimate partner violence     Fear of current or ex partner: Not on file     Emotionally abused: Not on file     Physically abused: Not on file     Forced sexual activity: Not on file   Other Topics Concern     Parent/sibling w/ CABG, MI or angioplasty before 65F 55M? No   Social History Narrative     Not on file       Family History  Family History   Problem Relation Age of Onset     Prostate Cancer Father      Other Cancer Father         pancreatic     Prostate Cancer Paternal Grandfather      Other - See Comments Son         cleft palete       Review of Systems  As per HPI and PMHx, otherwise 10+ comprehensive system review is negative.    Physical Exam  /74   Pulse 63   Temp 97.7  F (36.5  C)   GENERAL: Patient is a pleasant, cooperative 56 year old male in no acute distress.  HEAD: Normocephalic, atraumatic.  Hair and scalp are normal.  EYES: Pupils are equal, round, reactive to light and accommodation.  Extraocular movements are intact.  The sclera nonicteric without injection.  The extraocular structures are normal.  EARS: Normal shape and symmetry.  No tenderness when palpating the mastoid or tragal areas bilaterally.     NOSE: Nares are patent.  Nasal mucosa is pink and moist.   Negative anterior rhinoscopy.  ORAL CAVITY: Dentition is in good repair.  Mucous membranes are moist.  Examination of the oral cavity showed a 5 mm lesion located on the right anterior tongue on the dorsal portion adjacent to the laceration is healing.  There is no open lesion or ulceration.  There is no other tongue masses or lesions.  Noadditional oral cavity or oropharyngeal masses, lesions, ulcerations, leukoplakia.  NECK: Supple, trachea is midline.  There no palpable cervical lymphadenopathy or masses bilaterally.     NEUROLOGIC: Cranial nerves II through XII are grossly intact.  Voice is strong.  Patient is House-Brackmann I/VI bilaterally.  CARDIOVASCULAR: Extremities are warm and well-perfused.  No significant peripheral edema.  RESPIRATORY: Patient has nonlabored breathing without cough, wheeze, stridor.  PSYCHIATRIC: Patient is alert and oriented.  Mood and affect appear normal.  SKIN: Warm and dry.  No scalp, face, or neck lesions noted.    Assessment and Plan     ICD-10-CM    1. Tongue injury, sequela  S09.93XS triamcinolone (KENALOG) 0.1 % paste   2. Fibroma of tongue  D10.1 triamcinolone (KENALOG) 0.1 % paste     It was my pleasure seeing Lane Bosch today in clinic.  The patient presents today with post injury fibroma after tongue laceration that was healing by secondary intention.  He is developed a bit of a fibroma adjacent to the laceration.  At this point time, I would recommend this with Orabase for approximately two weeks to see if he has any improvement. I would like to see the patient back in 2-4 weeks to make sure it heals. If not, we could proceed with excisional biopsy in the office to help close and heal the lesion on the tongue.      Heriberto Nowak MD  Department of Otolarygology-Head and Neck Surgery  [facility]       Again, thank you for allowing me to participate in the care of your patient.        Sincerely,        Heriberto Nowak MD

## 2021-05-17 NOTE — NURSING NOTE
"Initial Temp 97.7  F (36.5  C)  Estimated body mass index is 22.79 kg/m  as calculated from the following:    Height as of 10/5/20: 1.753 m (5' 9\").    Weight as of 10/5/20: 70 kg (154 lb 5.2 oz). .    Consatnce Villegas MA on 5/17/2021 at 1:52 PM    "

## 2021-05-21 ENCOUNTER — OFFICE VISIT (OUTPATIENT)
Dept: FAMILY MEDICINE | Facility: CLINIC | Age: 56
End: 2021-05-21
Payer: COMMERCIAL

## 2021-05-21 VITALS
HEIGHT: 69 IN | WEIGHT: 162.8 LBS | SYSTOLIC BLOOD PRESSURE: 118 MMHG | HEART RATE: 52 BPM | DIASTOLIC BLOOD PRESSURE: 72 MMHG | BODY MASS INDEX: 24.11 KG/M2 | OXYGEN SATURATION: 98 % | TEMPERATURE: 97 F | RESPIRATION RATE: 12 BRPM

## 2021-05-21 DIAGNOSIS — D17.30 LIPOMA OF SKIN AND SUBCUTANEOUS TISSUE: ICD-10-CM

## 2021-05-21 DIAGNOSIS — Z12.5 SCREENING FOR PROSTATE CANCER: ICD-10-CM

## 2021-05-21 DIAGNOSIS — I73.00 RAYNAUD'S DISEASE WITHOUT GANGRENE: Primary | ICD-10-CM

## 2021-05-21 DIAGNOSIS — I10 BENIGN ESSENTIAL HYPERTENSION: ICD-10-CM

## 2021-05-21 PROCEDURE — 99214 OFFICE O/P EST MOD 30 MIN: CPT | Mod: 25 | Performed by: FAMILY MEDICINE

## 2021-05-21 PROCEDURE — 90471 IMMUNIZATION ADMIN: CPT | Performed by: FAMILY MEDICINE

## 2021-05-21 PROCEDURE — 90750 HZV VACC RECOMBINANT IM: CPT | Performed by: FAMILY MEDICINE

## 2021-05-21 RX ORDER — LISINOPRIL 20 MG/1
20 TABLET ORAL DAILY
Qty: 90 TABLET | Refills: 3 | Status: SHIPPED | OUTPATIENT
Start: 2021-05-21 | End: 2022-08-12

## 2021-05-21 ASSESSMENT — MIFFLIN-ST. JEOR: SCORE: 1552.8

## 2021-05-21 NOTE — NURSING NOTE
Prior to immunization administration, verified patients identity using patient s name and date of birth. Please see Immunization Activity for additional information.     Screening Questionnaire for Adult Immunization    Are you sick today?   No   Do you have allergies to medications, food, a vaccine component or latex?   No   Have you ever had a serious reaction after receiving a vaccination?   No   Do you have a long-term health problem with heart, lung, kidney, or metabolic disease (e.g., diabetes), asthma, a blood disorder, no spleen, complement component deficiency, a cochlear implant, or a spinal fluid leak?  Are you on long-term aspirin therapy?   No   Do you have cancer, leukemia, HIV/AIDS, or any other immune system problem?   No   Do you have a parent, brother, or sister with an immune system problem?   No   In the past 3 months, have you taken medications that affect  your immune system, such as prednisone, other steroids, or anticancer drugs; drugs for the treatment of rheumatoid arthritis, Crohn s disease, or psoriasis; or have you had radiation treatments?   No   Have you had a seizure, or a brain or other nervous system problem?   No   During the past year, have you received a transfusion of blood or blood    products, or been given immune (gamma) globulin or antiviral drug?   No   For women: Are you pregnant or is there a chance you could become       pregnant during the next month?   No   Have you received any vaccinations in the past 4 weeks?   No     Immunization questionnaire answers were all negative.        Per orders of Dr. Mckeon, injection of Shingrix #2 given by Jovita Fleming. Patient instructed to remain in clinic for 15 minutes afterwards, and to report any adverse reaction to me immediately.       Screening performed by Jovita Fleming on 5/21/2021 at 8:58 AM.

## 2021-05-21 NOTE — PATIENT INSTRUCTIONS
Raynaud's (vasospasm) in the fingers  Option  Amlodipine 5mg for both blood pressures and to prevent vasospasm that causes the white.    Likely a lipoma (collection of fat cells) in the subcutaneous fat layer    Patient Education     Raynaud Disease  Your healthcare provider has told you that you have Raynaud disease. It is also called Raynaud phenomenon or Raynaud syndrome. There is no cure for Raynaud disease, but you can manage it to help prevent attacks.    What are the symptoms of Raynaud disease?  A Raynaud disease attack is often triggered by cold or stress. During an attack, blood vessels suddenly narrow (called vasospasm).  This most often happens in fingers and toes. In rare cases, the nose, ears, nipples, or even tongue are affected. Narrowed blood vessels reduce the blood supply to the area. The area then turns white, blue, or red. The area may feel numb or painful. As the attack passes, the blood vessels open. The affected area may turn bright red as it warms up, then returns to normal color.  What is the cause of Raynaud disease?  With Raynaud disease, it is believed that blood vessels in the affected areas overrespond to certain triggers, such as cold. This makes them narrow (called vasospasm) much more than in people without the disease. Experts don t know what causes the blood vessels to react so strongly to certain triggers. In between attacks, the blood vessels are normal and healthy. Attacks don t permanently damage the blood vessels. But may thicken the artery walls.   In some cases, Raynaud disease happens along with another disease or condition. This is often a connective tissue disorder, such as lupus, scleroderma, or rheumatoid arthritis. This is called secondary Raynaud disease (as opposed to primary Raynaud disease discussed above) and may be more severe. If this is the case for you, you and your healthcare provider can discuss treatment for the underlying condition.  What are the risk  factors?  Risk factors for Raynaud disease include:    Women are more likely to get Raynaud disease than men.    Younger people are at higher risk, usually ages 15 to 30.    Living in colder climates increases risk.    Having a family member with Raynaud disease increases your risk.    Underlying rheumatoid conditions may increase your risk.   What are possible triggers?  Triggers for Raynaud disease include:     Cold    Stress    Caffeine    Smoking    Repetitive movements    Certain medicines, such as beta-blockers, migraine medicine, birth control pills and others    Injury  How is Raynaud disease diagnosed?  Your description of your symptoms, a health history, and a physical exam are often enough for a diagnosis. Blood tests and other tests may be done to see if any underlying conditions are present and rule out other problems.  How is Raynaud disease treated?  There is no cure for Raynaud disease. But you can control symptoms and reduce the number and severity of attacks. For most people, avoiding triggers is enough to limit attacks. Your healthcare provider may suggest the following:    Take precautions to help prevent your hands and feet from losing circulation. This includes:  ? Dressing warmly in cold weather.  ? Wearing gloves or mittens when your hands may become cold, such as when you use the refrigerator or freezer.  ? Avoiding stress and caffeine.  ? Exercising regularly. This may reduce the number and severity of attacks.   ? If you smoke, quitting may improve the condition. This is because smoking causes your blood vessels to narrow and reduces blood flow.    Soak your hands or feet in warm (not hot) water. Do this at the first sign of an attack. Keep soaking until your skin color returns to normal.  In some people, symptoms are persistent or troubling. For these cases, other treatments are a choice. Your healthcare provider can tell you more about the following:    Prescription medicines. Some  medicines that relax and widen blood vessels, such as calcium channel blockers. These may help relieve symptoms.    Nerve surgery. This is used for severe cases that don t respond to other treatments. Surgery removes the nerves that surround the blood vessels in the hands and feet. Without nerve stimulation, the blood vessels stay more relaxed. They are less likely to become very narrow due to stimuli. Nerves may be blocked using injections in some cases.  Most cases of Raynaud disease are not cause for concern. The disease doesn t get worse and isn t likely to cause any permanent damage. If attacks are severe, last for a long time, or occur very often, skin damage may result. Controlling attacks can help prevent this.  When to seek medical care  The following problems happen rarely, but they can be serious. Call your healthcare provider right away if you notice any of the following:    Infection or sores on the skin    A finger or toe turns black    The skin breaks open on its own    A rash develops    A finger or toe joint becomes painful or swollen  Boris last reviewed this educational content on 6/1/2018 2000-2021 [employer]. All rights reserved. This information is not intended as a substitute for professional medical care. Always follow your healthcare professional's instructions.

## 2021-05-21 NOTE — PROGRESS NOTES
Assessment & Plan     Benign essential hypertension  Stable, refill, discussed option of switching to amlodipine for the raynauds and BP, he will consider and contact me in the winter if not controlled with just hand warmers  - lisinopril (ZESTRIL) 20 MG tablet; Take 1 tablet (20 mg) by mouth daily  - **Basic metabolic panel FUTURE anytime; Future    Raynaud's disease without gangrene  Discussed, new diagnosis    Screening for prostate cancer  - PSA, screen; Future    Lipoma of skin and subcutaneous tissue  discussed           Return in about 1 year (around 5/21/2022) for Routine preventive, with me, in person.    Franc Mckeon MD  [facility] JUSTINE Mir is a 56 year old who presents for the following health issues    HPI     Hypertension Follow-up      Do you check your blood pressure regularly outside of the clinic? Yes     Are you following a low salt diet? Yes    Are your blood pressures ever more than 140 on the top number (systolic) OR more   than 90 on the bottom number (diastolic), for example 140/90? No       How many servings of fruits and vegetables do you eat daily?  4 or more    On average, how many sweetened beverages do you drink each day (Examples: soda, juice, sweet tea, etc.  Do NOT count diet or artificially sweetened beverages)?   0    How many days per week do you exercise enough to make your heart beat faster? 7    How many minutes a day do you exercise enough to make your heart beat faster? 60 or more    How many days per week do you miss taking your medication? 0     Hands Cold/white       Duration: ongoing - progressively worsening     Description (location/character/radiation): cold, white. 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     Intensity:  moderate    Accompanying signs and symptoms: painful. White hands. States it is bothersome because he is  "active outdoors.     History (similar episodes/previous evaluation): None    Precipitating or alleviating factors: None    Therapies tried and outcome: None, hand warmers      Bump under skin      Duration: x 6 mo    Description (location/character/radiation): right abdominal area     Intensity:  mild    Accompanying signs and symptoms: denies pain. It is under the skin     History (similar episodes/previous evaluation): None    Precipitating or alleviating factors: None    Therapies tried and outcome: None   No prior surgeries.          Review of Systems   Constitutional, HEENT, cardiovascular, pulmonary, gi and gu systems are negative, except as otherwise noted.      Objective    /72 (BP Location: Right arm, Patient Position: Sitting, Cuff Size: Adult Regular)   Pulse 52   Temp 97  F (36.1  C) (Tympanic)   Resp 12   Ht 1.759 m (5' 9.25\")   Wt 73.8 kg (162 lb 12.8 oz)   SpO2 98%   BMI 23.87 kg/m    There is no height or weight on file to calculate BMI.  Physical Exam   GENERAL: healthy, alert and no distress  RESP: lungs clear to auscultation - no rales, rhonchi or wheezes  CV: regular rate and rhythm, normal S1 S2, no S3 or S4, no murmur, click or rub, no peripheral edema and peripheral pulses strong  ABDOMEN: soft, nontender,small subcutaneous lump, mobile from the abdominal muscle wall. no hepatosplenomegaly, no masses and bowel sounds normal  MS: no gross musculoskeletal defects noted, no edema            "

## 2021-05-26 ENCOUNTER — VIRTUAL VISIT (OUTPATIENT)
Dept: PALLIATIVE MEDICINE | Facility: CLINIC | Age: 56
End: 2021-05-26
Payer: COMMERCIAL

## 2021-05-26 DIAGNOSIS — G57.02 PIRIFORMIS SYNDROME, LEFT: Primary | ICD-10-CM

## 2021-05-26 DIAGNOSIS — M70.62 GREATER TROCHANTERIC BURSITIS OF LEFT HIP: ICD-10-CM

## 2021-05-26 PROCEDURE — 99214 OFFICE O/P EST MOD 30 MIN: CPT | Mod: 95 | Performed by: PAIN MEDICINE

## 2021-05-26 RX ORDER — KETOROLAC TROMETHAMINE 10 MG/1
10 TABLET, FILM COATED ORAL EVERY 6 HOURS PRN
Qty: 20 TABLET | Refills: 0 | Status: SHIPPED | OUTPATIENT
Start: 2021-05-26 | End: 2022-02-22

## 2021-05-26 RX ORDER — METHOCARBAMOL 750 MG/1
750 TABLET, FILM COATED ORAL 3 TIMES DAILY PRN
Qty: 90 TABLET | Refills: 3 | Status: SHIPPED | OUTPATIENT
Start: 2021-05-26 | End: 2022-02-22

## 2021-05-26 ASSESSMENT — PAIN SCALES - GENERAL: PAINLEVEL: MODERATE PAIN (5)

## 2021-05-26 NOTE — PATIENT INSTRUCTIONS
----------------------------------------------------------------  [facility] Number:  [phone]     Call with any questions about your care and for scheduling assistance.     Calls are returned Monday through Friday between 8 AM and 4:30 PM. We usually get back to you within 2 business days depending on the issue/request.    If we are prescribing your medications:    For opioid medication refills, call the clinic or send a [employer] message 7 days in advance.  Please include:    Name of requested medication    Name of the pharmacy.    For non-opioid medications, call your pharmacy directly to request a refill. Please allow 3-4 days to be processed.     Per [place] State Law:    All controlled substance prescriptions must be filled within 30 days of being written.      For those controlled substances allowing refills, pickup must occur within 30 days of last fill.      We believe regular attendance is key to your success in our program!      Any time you are unable to keep your appointment we ask that you call us at least 24 hours in advance to cancel.This will allow us to offer the appointment time to another patient.     Multiple missed appointments may lead to dismissal from the clinic.

## 2021-05-26 NOTE — PROGRESS NOTES
Clarke is a 56 year old who is being evaluated via a billable video visit.      How would you like to obtain your AVS? [email or web address]  If the video visit is dropped, the invitation should be resent by: Julia waiting room  Will anyone else be joining your video visit? Christen Collins CMA (AAMA)        Video Start Time: 1025  Video-Visit Details    Type of service:  Video Visit    Video End Time:10:46 AM    Originating Location (pt. Location): Home    Distant Location (provider location):  [facility] [place]     Platform used for Video Visit: [facility] f/u        Reason for consultation:    Primary Care Provider is Franc Mckeon.  Pain medications are being prescribed by pcp.    Please see the [facility] health questionnaire which the patient completed and reviewed with me in detail.    Chief Complaint:    Chief Complaint   Patient presents with     Pain     Video visit due to COVID-19     MME prescribed prior to seeing patient:  Current MME:    rec     Further procedures recommended:    -Would not recommend a piriformis injection at this time.  - Medication Management:    -Continue robaxin 500mg BID prn   - Physical Therapy:    -Continue home stretches  - Follow up:    -As needed        Interval  Reports the tongue is improving   The pt reports approx 2 wks ago he was having severe pain   The pt was considering going to the ER  The pt reports for 2 days he took a lot of motrin and robaxin with benefit  The pt understands he took too much of motrin  I discussed the robaxin dose was safe    The pt reports with activity the pain returns  The pain is in his left upper buttocks   He denies any radiation down his  Left LE   He denies any numbness  He denies any tingling   He denies burning  He denies any weakness  He denies any recent falls    Overall the pain is sig affecting his quality of life  Overall the pain is affecting his sleep     Of  note he has noticed some decreased rom of his left ankle and maybe some weakness      Pain history:  Lane Bosch is a 55 year old male who first started having problems with pain feel his pain started a yr ago. He denies any specific inciting event. The pt reports he was in shower and felt a severe spasm. The pt has hx of of lami on left side 8 yrs ago .  Patient is unsure what level.  Unable to find any documentation of imaging in the chart.  He reports current symptoms are different than prior. At that time he had terrible pain in his buttocks and left ankle. The pt reports reports amazing benefit with the surgery. He reports he hasnt had much back pain. Currently the pt reports most of symptoms are at night and while being more active. The pain is currently spasms in his lower outer buttocks.  He notes occasional radiation to his lateral posterior thigh the pain is also sharp stabbing.  The pain is intermittent. The pt reports intermittent burning. Denies any numbness tingling or burning. In general minor radiation Occasionaly radiates to lat/post thigh.The pain is not as bad as herniated in disc.   The pt reports pain is worse with prolonged activity.   The pt reports benefit with massage.The pt reports benefit when rubbing with a lacrosse nerve. Worse when laying on his buttocks in bed. The pain is slightly worse with stretching in certain ways. Occasionally hurts with prolonged sitting. The pt feels his range of motion at his hip is worse. Feels 2/2to stiffness and pain. The pt reports he occasionally does piriformis stretches   He denies any recent falls. Denies overt progressive weakness, but does feel over weakness.denies any incontinence.    He reports he is having issues sleeping     Overal sig affects quality of life    Sometime its unbarealbe         Pain rating: intensity  Averages 5/10 on a 0-10 scale.      Current treatments include:  robaxin    Previous medication treatments included:  muslce  relaxer    Other treatments have included:  Lane Bosch has not been seen at a pain clinic in the past.    PT: Not recently  Chiropractic: n  Acupuncture: n  TENs Unit: n  Injections: n    Past Medical History:  Past Medical History:   Diagnosis Date     Hypertension      Past Surgical History:  Past Surgical History:   Procedure Laterality Date     C CYSTOTOMY,REPAIR URETEROCELE  2015     COLONOSCOPY N/A 10/5/2020    Procedure: COLONOSCOPY, WITH POLYPECTOMY AND BIOPSY;  Surgeon: Jackie Ruiz MD;  Location: [facility]     HEMILAMINECTOMY, DISCECTOMY LUMBAR ONE LEVEL, COMBINED  12/13/2012     L4-5     Medications:  Current Outpatient Medications   Medication Sig Dispense Refill     ketorolac (TORADOL) 10 MG tablet Take 1 tablet (10 mg) by mouth every 6 hours as needed for moderate pain 20 tablet 0     lisinopril (ZESTRIL) 20 MG tablet Take 1 tablet (20 mg) by mouth daily 90 tablet 3     methocarbamol (ROBAXIN) 500 MG tablet Take 1 tablet (500 mg) by mouth 2 times daily as needed for muscle spasms 60 tablet 1     triamcinolone (KENALOG) 0.1 % paste Take by mouth 2 times daily 30 g 1     Allergies:   No Known Allergies  Social History:  Home situation: [facility]  Occupation/Schooling: y  Tobacco use: n  Alcohol use: n  Drug use: n  History of chemical dependency treatment: n    Family history:  Family History   Problem Relation Age of Onset     Prostate Cancer Father      Other Cancer Father         pancreatic     Prostate Cancer Paternal Grandfather      Other - See Comments Son         cleft palete     Family history ha no    Review of Systems:  Skin: negative  Eyes: negative  Ears/Nose/Throat: negative  Respiratory: No shortness of breath, dyspnea on exertion, cough, or hemoptysis  Cardiovascular: negative  Gastrointestinal: negative  Genitourinary: negative  Musculoskeletal: negative  Neurologic: negative  Psychiatric: negative  Hematologic/Lymphatic/Immunologic: negative  Endocrine: negative    Physical Exam:  There were  no vitals filed for this visit.  Exam:  Constitutional: healthy, alert and no distress  Respiratory: Speaking in full sentences no accessory muscles use     Psychiatric: mentation appears normal and affect normal/bright    Musculoskeletal exam:  Gait/Station/Posture: wnl    Neg slump  Unable to do nickolas on the left side, feels to uncomfortable     Motor: Able to easily overcome gravity  n/a      Diagnostic tests:  M            Assessment/Plan:  Lane Bosch is a 55 year old male who presents with the complaints of left-sided buttocks pain with occasional radiation.   Lane was seen today for pain.    Diagnoses and all orders for this visit:    Piriformis syndrome, left  -     ketorolac (TORADOL) 10 MG tablet; Take 1 tablet (10 mg) by mouth every 6 hours as needed for moderate pain    Greater trochanteric bursitis of left hip  -     ketorolac (TORADOL) 10 MG tablet; Take 1 tablet (10 mg) by mouth every 6 hours as needed for moderate pain         - Further procedures recommended:    -consider piriformis vs greater trochanteric bursa injection  - Medication Management:    -robaxin 750mg BID prn   - 5 day course of toradol. Discussed take with food no other NSAIDS   - Consider diclofenac long term   - Physical Therapy:    -Continue home stretches  - [facility]          The total TIME spent on this patient on the day of the appointment was 25 minutes.   Time spent preparing to see the patient (reviewing records and tests)  Time spend face to face with the patient  Time spent ordering tests, medications, procedures and referrals  Time spent Referring and communicating with other healthcare professionals  Documenting clinical information in Epic      Cheng Cardona MD  [facility]  This note was created with voice recognition software, and while reviewed for accuracy, typos may remain.

## 2021-10-05 ENCOUNTER — OFFICE VISIT (OUTPATIENT)
Dept: ORTHOPEDICS | Facility: CLINIC | Age: 56
End: 2021-10-05
Payer: COMMERCIAL

## 2021-10-05 ENCOUNTER — ANCILLARY PROCEDURE (OUTPATIENT)
Dept: GENERAL RADIOLOGY | Facility: CLINIC | Age: 56
End: 2021-10-05
Attending: FAMILY MEDICINE
Payer: COMMERCIAL

## 2021-10-05 VITALS
WEIGHT: 162 LBS | HEIGHT: 69 IN | BODY MASS INDEX: 23.99 KG/M2 | DIASTOLIC BLOOD PRESSURE: 85 MMHG | SYSTOLIC BLOOD PRESSURE: 158 MMHG

## 2021-10-05 DIAGNOSIS — G89.29 CHRONIC LEFT HIP PAIN: Primary | ICD-10-CM

## 2021-10-05 DIAGNOSIS — M16.12 PRIMARY OSTEOARTHRITIS OF LEFT HIP: ICD-10-CM

## 2021-10-05 DIAGNOSIS — M25.552 CHRONIC LEFT HIP PAIN: ICD-10-CM

## 2021-10-05 DIAGNOSIS — M25.552 CHRONIC LEFT HIP PAIN: Primary | ICD-10-CM

## 2021-10-05 DIAGNOSIS — G89.29 CHRONIC LEFT HIP PAIN: ICD-10-CM

## 2021-10-05 PROCEDURE — 73502 X-RAY EXAM HIP UNI 2-3 VIEWS: CPT | Mod: LT | Performed by: RADIOLOGY

## 2021-10-05 PROCEDURE — 20611 DRAIN/INJ JOINT/BURSA W/US: CPT | Mod: LT | Performed by: FAMILY MEDICINE

## 2021-10-05 PROCEDURE — 99213 OFFICE O/P EST LOW 20 MIN: CPT | Mod: 25 | Performed by: FAMILY MEDICINE

## 2021-10-05 RX ORDER — ROPIVACAINE HYDROCHLORIDE 5 MG/ML
3 INJECTION, SOLUTION EPIDURAL; INFILTRATION; PERINEURAL
Status: DISCONTINUED | OUTPATIENT
Start: 2021-10-05 | End: 2022-03-17

## 2021-10-05 RX ORDER — TRIAMCINOLONE ACETONIDE 40 MG/ML
40 INJECTION, SUSPENSION INTRA-ARTICULAR; INTRAMUSCULAR
Status: DISCONTINUED | OUTPATIENT
Start: 2021-10-05 | End: 2022-03-17

## 2021-10-05 RX ADMIN — ROPIVACAINE HYDROCHLORIDE 3 ML: 5 INJECTION, SOLUTION EPIDURAL; INFILTRATION; PERINEURAL at 12:00

## 2021-10-05 RX ADMIN — TRIAMCINOLONE ACETONIDE 40 MG: 40 INJECTION, SUSPENSION INTRA-ARTICULAR; INTRAMUSCULAR at 12:00

## 2021-10-05 ASSESSMENT — MIFFLIN-ST. JEOR: SCORE: 1549.17

## 2021-10-05 NOTE — LETTER
10/5/2021         RE: Lane Bosch  [address] [address]        Dear Colleague,    Thank you for referring your patient, Lane Bosch, to the [facility] [place]. Please see a copy of my visit note below.    Lane Bosch  :  1965  DOS: 10/5/2021  MRN: [id number]    Sports Medicine Clinic Visit    PCP: Franc Mckeon    Lane Bosch is a 56 year old male who is seen as a self referral presenting with chronic left hip pain.    Injury: Gradual onset of pain over the past 2+ years.  Pain located over left deep anterior and posterior hip, nonradiating.  Additional Features:  Positive: grinding and decreased AROM.  Symptoms are better with Other medications: toradol and Rest.  Symptoms are worse with: hip flexion/extension, going from sit to stand.  Other evaluation and/or treatments so far consists of: Ibuprofen, Other medications: toradol, robaxin, Rest and Pain Mgmt consult (Dr Cardona).  Recent imaging completed: No recent imaging completed.  Prior History of related problems: none    Social History: retired     Review of Systems  Musculoskeletal: as above  Remainder of review of systems is negative including constitutional, CV, pulmonary, GI, Skin and Neurologic except as noted in HPI or medical history.    Past Medical History:   Diagnosis Date     Hypertension      Past Surgical History:   Procedure Laterality Date     C CYSTOTOMY,REPAIR URETEROCELE       COLONOSCOPY N/A 10/5/2020    Procedure: COLONOSCOPY, WITH POLYPECTOMY AND BIOPSY;  Surgeon: Jackie Ruiz MD;  Location: [facility]     HEMILAMINECTOMY, DISCECTOMY LUMBAR ONE LEVEL, COMBINED  2012     L4-5     Family History   Problem Relation Age of Onset     Prostate Cancer Father      Other Cancer Father         pancreatic     Prostate Cancer Paternal Grandfather      Other - See Comments Son         cleft palete       Objective  BP (!) 158/85   Ht 1.759 m  "(5' 9.25\")   Wt 73.5 kg (162 lb)   BMI 23.75 kg/m        General: healthy, alert and in no distress      HEENT: no scleral icterus or conjunctival erythema     Skin: no suspicious lesions or rash. No jaundice.     CV: regular rhythm by palpation, 2+ distal pulses, no pedal edema      Resp: normal respiratory effort without conversational dyspnea     Psych: normal mood and affect      Gait: nonantalgic, appropriate coordination and balance     Neuro: normal light touch sensory exam of the extremities. Motor strength as noted below     Left hip exam    Inspection:        no edema or ecchymosis in hip area    ROM:       Flexion 90 degrees      internal rotation 10 degrees      external rotation 30 degrees      Range of motion limited by pain    Strength:        flexion 5/5       extension 5/5       abduction 5/5       adduction 5/5    Tender:        greater trochanter       Anterior hip joint    Non Tender:        remainder of hip area    Sensation:        grossly intact in hip and thigh    Skin:       well perfused       capillary refill brisk    Special Tests:        neg (-) JUAN CARLOS       positive (+) FADIR       positive (+) scour    Radiology  Recent Results (from the past 744 hour(s))   XR Pelvis w Hip Left 1 View    Narrative    PELVIS AND LEFT HIP, ONE VIEW   10/5/2021 11:31 AM     HISTORY:  Chronic left hip pain.    FINDINGS: Lower lumbar spine degenerative change. Right hip  osteoarthritis with mild-moderate joint space narrowing  superolaterally.      Impression    IMPRESSION: Left hip osteoarthritis with prominent joint space  narrowing superolaterally. No fracture identified.        Large Joint Injection/Arthocentesis: L hip joint    Date/Time: 10/5/2021 12:00 PM  Performed by: Romero Stark DO  Authorized by: Romero Stark DO     Indications:  Pain, diagnostic evaluation and osteoarthritis  Needle Size:  22 G  Guidance: ultrasound    Approach:  Anterior  Location:  Hip      Site:  L " hip joint  Medications:  3 mL ropivacaine 5 MG/ML; 40 mg triamcinolone 40 MG/ML  Outcome:  Tolerated well, no immediate complications  Procedure discussed: discussed risks, benefits, and alternatives    Consent Given by:  Patient  Timeout: timeout called immediately prior to procedure    Prep: patient was prepped and draped in usual sterile fashion     4 ml's of 1% lidocaine was used as local anesthetic prior to injection      Assessment:  1. Chronic left hip pain    2. Primary osteoarthritis of left hip        Plan:  Discussed the assessment with the patient.  Follow up: As needed based on clinical progress  XR images independently visualized and reviewed with patient today in clinic  Left >> right hip osteoarthritis changes on x-ray, consistent with exam  Reviewed low impact activity strategies, core and hip strength and stabilization goals long-term  Ultrasound-guided left hip intra-articular injection today with good initial relief from local anesthetic effect  Reviewed diagnostic and therapeutic goals of corticosteroid injection, as well as limitations and risks  Consider orthopedic surgery referral if needed in the future based on clinical progress  Physical therapy options reviewed in detail, order available anytime if desired  Expectations and goals of CSI reviewed  Often 2-3 days for steroid effect, and can take up to two weeks for maximum effect  We discussed modified progressive pain-free activity as tolerated  Do not overuse in first two weeks if feeling better due to concern for vulnerability while steroid is working  Supportive care reviewed  All questions were answered today  Contact us with additional questions or concerns  Signs and sx of concern reviewed      Romero Stark DO, OLIVA  Sports Medicine Physician  [facility]            Disclaimer: This note consists of symbols derived from keyboarding, dictation and/or voice recognition software. As a result, there may  be errors in the script that have gone undetected. Please consider this when interpreting information found in this chart.      Again, thank you for allowing me to participate in the care of your patient.        Sincerely,        Romero Stark, DO

## 2021-10-05 NOTE — PROGRESS NOTES
"Lane Bosch  :  1965  DOS: 10/5/2021  MRN: [id number]    Sports Medicine Clinic Visit    PCP: Franc Mckeon    Lane Bosch is a 56 year old male who is seen as a self referral presenting with chronic left hip pain.    Injury: Gradual onset of pain over the past 2+ years.  Pain located over left deep anterior and posterior hip, nonradiating.  Additional Features:  Positive: grinding and decreased AROM.  Symptoms are better with Other medications: toradol and Rest.  Symptoms are worse with: hip flexion/extension, going from sit to stand.  Other evaluation and/or treatments so far consists of: Ibuprofen, Other medications: toradol, robaxin, Rest and Pain Mgmt consult (Dr Cardona).  Recent imaging completed: No recent imaging completed.  Prior History of related problems: none    Social History: retired     Review of Systems  Musculoskeletal: as above  Remainder of review of systems is negative including constitutional, CV, pulmonary, GI, Skin and Neurologic except as noted in HPI or medical history.    Past Medical History:   Diagnosis Date     Hypertension      Past Surgical History:   Procedure Laterality Date     C CYSTOTOMY,REPAIR URETEROCELE       COLONOSCOPY N/A 10/5/2020    Procedure: COLONOSCOPY, WITH POLYPECTOMY AND BIOPSY;  Surgeon: Jackie Ruiz MD;  Location: [facility]     HEMILAMINECTOMY, DISCECTOMY LUMBAR ONE LEVEL, COMBINED  2012     L4-5     Family History   Problem Relation Age of Onset     Prostate Cancer Father      Other Cancer Father         pancreatic     Prostate Cancer Paternal Grandfather      Other - See Comments Son         cleft palete       Objective  BP (!) 158/85   Ht 1.759 m (5' 9.25\")   Wt 73.5 kg (162 lb)   BMI 23.75 kg/m        General: healthy, alert and in no distress      HEENT: no scleral icterus or conjunctival erythema     Skin: no suspicious lesions or rash. No jaundice.     CV: regular rhythm by palpation, 2+ distal pulses, no " pedal edema      Resp: normal respiratory effort without conversational dyspnea     Psych: normal mood and affect      Gait: nonantalgic, appropriate coordination and balance     Neuro: normal light touch sensory exam of the extremities. Motor strength as noted below     Left hip exam    Inspection:        no edema or ecchymosis in hip area    ROM:       Flexion 90 degrees      internal rotation 10 degrees      external rotation 30 degrees      Range of motion limited by pain    Strength:        flexion 5/5       extension 5/5       abduction 5/5       adduction 5/5    Tender:        greater trochanter       Anterior hip joint    Non Tender:        remainder of hip area    Sensation:        grossly intact in hip and thigh    Skin:       well perfused       capillary refill brisk    Special Tests:        neg (-) JUAN CARLOS       positive (+) FADIR       positive (+) scour    Radiology  Recent Results (from the past 744 hour(s))   XR Pelvis w Hip Left 1 View    Narrative    PELVIS AND LEFT HIP, ONE VIEW   10/5/2021 11:31 AM     HISTORY:  Chronic left hip pain.    FINDINGS: Lower lumbar spine degenerative change. Right hip  osteoarthritis with mild-moderate joint space narrowing  superolaterally.      Impression    IMPRESSION: Left hip osteoarthritis with prominent joint space  narrowing superolaterally. No fracture identified.        Large Joint Injection/Arthocentesis: L hip joint    Date/Time: 10/5/2021 12:00 PM  Performed by: Romero Stark DO  Authorized by: Romero Stark DO     Indications:  Pain, diagnostic evaluation and osteoarthritis  Needle Size:  22 G  Guidance: ultrasound    Approach:  Anterior  Location:  Hip      Site:  L hip joint  Medications:  3 mL ropivacaine 5 MG/ML; 40 mg triamcinolone 40 MG/ML  Outcome:  Tolerated well, no immediate complications  Procedure discussed: discussed risks, benefits, and alternatives    Consent Given by:  Patient  Timeout: timeout called immediately  prior to procedure    Prep: patient was prepped and draped in usual sterile fashion     4 ml's of 1% lidocaine was used as local anesthetic prior to injection      Assessment:  1. Chronic left hip pain    2. Primary osteoarthritis of left hip        Plan:  Discussed the assessment with the patient.  Follow up: As needed based on clinical progress  XR images independently visualized and reviewed with patient today in clinic  Left >> right hip osteoarthritis changes on x-ray, consistent with exam  Reviewed low impact activity strategies, core and hip strength and stabilization goals long-term  Ultrasound-guided left hip intra-articular injection today with good initial relief from local anesthetic effect  Reviewed diagnostic and therapeutic goals of corticosteroid injection, as well as limitations and risks  Consider orthopedic surgery referral if needed in the future based on clinical progress  Physical therapy options reviewed in detail, order available anytime if desired  Expectations and goals of CSI reviewed  Often 2-3 days for steroid effect, and can take up to two weeks for maximum effect  We discussed modified progressive pain-free activity as tolerated  Do not overuse in first two weeks if feeling better due to concern for vulnerability while steroid is working  Supportive care reviewed  All questions were answered today  Contact us with additional questions or concerns  Signs and sx of concern reviewed      Romero Stark DO, OLIVA  Sports Medicine Physician  [facility]            Disclaimer: This note consists of symbols derived from keyboarding, dictation and/or voice recognition software. As a result, there may be errors in the script that have gone undetected. Please consider this when interpreting information found in this chart.

## 2021-10-09 ENCOUNTER — HEALTH MAINTENANCE LETTER (OUTPATIENT)
Age: 56
End: 2021-10-09

## 2021-10-14 NOTE — PROGRESS NOTES
PHYSICAL THERAPY DISCHARGE NOTE  Patient did not return for follow up treatments.  Goal status and current objective information is therefore unknown.  The daily note from the patient's last visit will serve as the discharge note. Discharge from PT services at this time for this episode of treatment. Please see attached documentation under this episode of care for further information including dates of service, start of care date, referring physician, Dx, treatment plan, treatments, etc.    Please contact me with any questions or concerns.  Thank you for your referral.    Lay Shanks PT, DPT, OCS  Physical Therapist, Orthopedic Certified Specialist    [facility]  [address]   [address]  [place] [address]  [email or web address]   Office: [phone]   Employed by [employer] [facility] [employer]     10/06/20 1300   Signing Clinician's Name / Credentials   Signing clinician's name / credentials Lay Shanks PT, DPT, OCS   Session Number   Session Number 1    Progress Note/Recertification   Progress Note Due Date 12/03/20   Recertification Due Date 12/03/20   Adult Goals   PT Ortho Eval Goals 1;2;3;4   Ortho Goal 1   Goal Identifier 1   Goal Description Patient will be able to perform lifting with only minimal difficulty.    Target Date 12/03/20   Ortho Goal 2   Goal Identifier 2   Goal Description Patient will be able to perfrom squatting with only minimal difficulty.    Target Date 12/03/20   Ortho Goal 3   Goal Identifier 3   Goal Description Patient will report reduced pain with sleeping.    Target Date 12/03/20   Ortho Goal 4   Goal Identifier 4   Goal Description Patient will be independent with HEP to aid functional recovery.    Target Date 12/03/20   Subjective Report   Subjective Report see eval   Treatment Interventions   Interventions Therapeutic Procedure/Exercise;Manual Therapy   Therapeutic Procedure/exercise   Therapeutic Procedures:  strength, endurance, ROM, flexibillity minutes (04100) 15   Skilled Intervention HEP instruction, ROM, flexibility to decrease pain and improve function   Patient Response no pain, felt good stretch   Treatment Detail instr pt in quadruped rock backs x 10, supine buttefly str 30' x 2, kneeling and standing HF str - pt prefering the kneeling option, 30' hold with stretching   Manual Therapy   Manual Therapy: Mobilization, MFR, MLD, friction massage minutes (80609) 10   Skilled Intervention MT to improve mobility and decrease pain   Patient Response normal pelvic alignment   Treatment Detail MET: pubic shotgun and R ant/L post rot innomiante.    Education   Learner Patient   Readiness Eager   Method Booklet/handout;Explanation;Demonstration   Response Verbalizes Understanding;Demonstrates Understanding   Plan   Home program [email or web address]   Plan for next session hip joint mobs, soft tissue piriformis, add glute strength (clams, bridging)   Total Session Time   Timed Code Treatment Minutes 25   Total Treatment Time (sum of timed and untimed services) 55   AMBULATORY CLINICS ONLY-MEDICAL AND TREATMENT DIAGNOSIS   Medical Diagnosis Piriformis syndrome, left    PT Diagnosis L posterior hip pain

## 2021-10-18 ENCOUNTER — MYC MEDICAL ADVICE (OUTPATIENT)
Dept: PALLIATIVE MEDICINE | Facility: CLINIC | Age: 56
End: 2021-10-18

## 2021-10-18 DIAGNOSIS — M70.62 GREATER TROCHANTERIC BURSITIS OF LEFT HIP: Primary | ICD-10-CM

## 2021-10-18 NOTE — TELEPHONE ENCOUNTER
RECORDS RECEIVED FROM: Left hip pain/ Dr Romero Stark/ Xrays done/ No MRI    DATE RECEIVED: Nov 4, 2021   NOTES STATUS DETAILS   OFFICE NOTE from referring provider Internal Romero Stark DO     MEDICATION LIST Internal    XRAYS (IMAGES & REPORTS) Internal 10/5/01

## 2021-10-18 NOTE — TELEPHONE ENCOUNTER
From: Clarke Bosch      Created: 10/18/2021 3:09 AM        *-*-*This message has not been handled.*-*-*    Dr. Cardona,     The likely cause of my hip pain has been diagnosed as hip OA. I have an appointment with an orthopedic surgeon on November 4 to discuss a hip replacement. In the meantime, can you suggest changes to my pain medication? The methocarbamol seems to help with my nightly leg cramps, but hip pain is keeping me up at night, and it's also affecting daily activities.      Thanks,  Clarke Bosch        Will forward to Dr. Cardona to review and advise.  Last office visit 5/26/21  - Further procedures recommended:               -consider piriformis vs greater trochanteric bursa injection  - Medication Management:               -robaxin 750mg BID prn              - 5 day course of toradol. Discussed take with food no other NSAIDS              - Consider diclofenac long term   - Physical Therapy:               -Continue home stretches  - Follow up:               - 2weeks via wilma Hidalgo RN  Patient Care Supervisor   [facility]

## 2021-10-20 NOTE — TELEPHONE ENCOUNTER
From: Clarke Bosch      Created: 10/19/2021 3:39 PM        *-*-*This message was handled on 10/20/2021 10:21 AM by ROMERO CHRISTIE*-*-*    Diclofenac would be great, thanks. Please place the order.          From: Clarke Bosch      Created: 10/20/2021 11:47 AM        *-*-*This message was handled on 10/20/2021 1:05 PM by MARCO VERNON*-*-*    [facility]  [address] [place] [address]        Pt would Like to have order for Diclofenac to be sent in.      Romero Christie, RN  Patient Care Supervisor   [facility]

## 2021-10-21 RX ORDER — DICLOFENAC SODIUM 75 MG/1
75 TABLET, DELAYED RELEASE ORAL 2 TIMES DAILY
Qty: 60 TABLET | Refills: 1 | Status: SHIPPED | OUTPATIENT
Start: 2021-10-21 | End: 2022-02-22

## 2021-11-04 ENCOUNTER — PRE VISIT (OUTPATIENT)
Dept: ORTHOPEDICS | Facility: CLINIC | Age: 56
End: 2021-11-04

## 2021-11-04 ENCOUNTER — OFFICE VISIT (OUTPATIENT)
Dept: ORTHOPEDICS | Facility: CLINIC | Age: 56
End: 2021-11-04
Payer: COMMERCIAL

## 2021-11-04 VITALS — HEIGHT: 69 IN | WEIGHT: 162 LBS | BODY MASS INDEX: 23.99 KG/M2

## 2021-11-04 DIAGNOSIS — M25.552 LEFT HIP PAIN: Primary | ICD-10-CM

## 2021-11-04 PROCEDURE — 99204 OFFICE O/P NEW MOD 45 MIN: CPT | Performed by: ORTHOPAEDIC SURGERY

## 2021-11-04 ASSESSMENT — HOOS JR
LYING IN BED (TURNING OVER, MAINTAINING HIP POSITION): EXTREME
SITTING: SEVERE
HOOS JR TOTAL INTERVAL SCORE: 46.65
GOING UP OR DOWN STAIRS: MODERATE
WALKING ON UNEVEN SURFACE: MODERATE
BENDING TO THE FLOOR TO PICK UP OBJECT: SEVERE

## 2021-11-04 ASSESSMENT — MIFFLIN-ST. JEOR: SCORE: 1549.17

## 2021-11-04 NOTE — PROGRESS NOTES
Assessment: This is a 56 year old with advnaced left hip osteoarthritis associated with severe symptoms that limit his ADLs. Had a good response to intra-articular steroid from a diagnostic standpoint. We discussed the diagnosis and the treatment options including living with it and total hip. We spent twenty minutes discussing total hip arthroplasty.  We discussed the implants, the procedure, the risks and benefits, and the post-operative course.  We discussed blood clots, blood clots to the lungs, injury to blood vessels and nerves, dislocation, infection, and leg length difference. We discussed that some of his favored activities will be limited due to the motion limitations of common implants. All the patients questions were answered to the best of my ability.    Plan:  Left total hip when the patient chooses to go forward with it. Appropriate to consider RAP. Would  against discharge to home due to past history of requiring bladder catheterization.    Chief Complaint: Consult (Left hip pain, has been having pain in the posterior and anterior hip, is coming in to discuss a ARLEEN )      Physician:  No ref. provider found    HPI: Lane Bosch is a 56 year old male who presents today for evaluation of    Symptom Profile  Location of symptoms:  Groin and buttock (75%)  Onset: insidious  Trend: getting worse   Duration of symptoms: 4-5 years   Quality of symptoms: aching, sharp/stabbing  Severity: severe  Alleviate:activitiy modification   Exacerbating: activities (mountain cli,bers were how he first noticed)  Previous Treatments: Previous treatments include activity modification, oral pain medication,     inrta-articular steroid injection with good relief of symptoms.   ALVERTO Jr: 46.45    MEDICAL HISTORY:   Past Medical History:   Diagnosis Date     Hypertension        Medications:     Current Outpatient Medications:      diclofenac (VOLTAREN) 75 MG EC tablet, Take 1 tablet (75 mg) by mouth 2 times daily,  Disp: 60 tablet, Rfl: 1     ketorolac (TORADOL) 10 MG tablet, Take 1 tablet (10 mg) by mouth every 6 hours as needed for moderate pain, Disp: 20 tablet, Rfl: 0     lisinopril (ZESTRIL) 20 MG tablet, Take 1 tablet (20 mg) by mouth daily, Disp: 90 tablet, Rfl: 3     methocarbamol (ROBAXIN) 750 MG tablet, Take 1 tablet (750 mg) by mouth 3 times daily as needed for muscle spasms, Disp: 90 tablet, Rfl: 3     triamcinolone (KENALOG) 0.1 % paste, Take by mouth 2 times daily, Disp: 30 g, Rfl: 1    Current Facility-Administered Medications:      ropivacaine (NAROPIN) injection 3 mL, 3 mL, , , Romero Stark, , 3 mL at 10/05/21 1200     triamcinolone (KENALOG-40) injection 40 mg, 40 mg, , , Romero Stark DO, 40 mg at 10/05/21 1200    Allergies: Patient has no known allergies.    SURGICAL HISTORY:   Past Surgical History:   Procedure Laterality Date     C CYSTOTOMY,REPAIR URETEROCELE  2015     COLONOSCOPY N/A 10/5/2020    Procedure: COLONOSCOPY, WITH POLYPECTOMY AND BIOPSY;  Surgeon: Jackie Ruiz MD;  Location: [facility]     HEMILAMINECTOMY, DISCECTOMY LUMBAR ONE LEVEL, COMBINED  12/13/2012     L4-5   p[rimarly leg symptoms     FAMILY HISTORY:   Family History   Problem Relation Age of Onset     Prostate Cancer Father      Other Cancer Father         pancreatic     Prostate Cancer Paternal Grandfather      Other - See Comments Son         cleft palete       SOCIAL HISTORY:   Social History     Tobacco Use     Smoking status: Never Smoker     Smokeless tobacco: Never Used   Substance Use Topics     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  No fever, weight change or fatigue. No dry eyes. No oral ulcers, sore throat or voice change. No palpitations, syncope, angina or edema.  No chest pain, excessive sleepiness, shortness of breath or hemoptysis.   No abdominal pain, nausea, vomiting, diarrhea or  "heartburn.  No skin rash. No focal weakness or numbness. No bleeding or lymphadenopathy. No rhinitis or hives.     Exam:  On physical examination the patient appears the stated age, is in no acute distress, affectThe is appropriate, and breathing is non-labored.  Vitals are documented in the EMR and have been reviewed:    Ht 1.759 m (5' 9.25\")   Wt 73.5 kg (162 lb)   BMI 23.75 kg/m    5' 9.25\"  Body mass index is 23.75 kg/m .    Rises from chair:  Gait:  Trendelenburg test:  Gains the exam table:    RIGHT hip subjective:   Abd: 25  Add:10  PFC:  Flexion: 90  IRF:0  ERF: 25  Impingement test: -    LEFT hip subjective: a little irritated   Abd: 20  Add:10  PFC:  Flexion: 55  IRF: -5  ERF:15  Impingement test: +    Distally, the circulatory, motor, and sensation exam is intact with 5/5 EHL, gastroc-soleus, and tibialis anterior.  Sensation to light touch is intact.  Dorsalis pedis and posterior tibialis pulses are palpable.  There are no sores on the feet, no bruising, and no lymphedema.    X-rays:   Study Result    Narrative & Impression   PELVIS AND LEFT HIP, ONE VIEW   10/5/2021 11:31 AM      HISTORY:  Chronic left hip pain.     FINDINGS: Lower lumbar spine degenerative change. Right hip  osteoarthritis with mild-moderate joint space narrowing  superolaterally.                                                                      IMPRESSION: Left hip osteoarthritis with prominent joint space  narrowing superolaterally. No fracture identified.      JIMMIE JANG MD         SYSTEM ID:  [email or web address]           "

## 2021-11-04 NOTE — NURSING NOTE
Teaching Flowsheet   Relevant Diagnosis: Left hip osteoarthritis  Teaching Topic: Left total hip arthroplasty.    Patient's health history is positive for slight HTN on Lisinopril. He also has a history of urinary retention after anesthesia. His wife Mone will be his support person/ for surgery. Potential [facility] but is leaning towards [place] due to urinary retention issues. He will call after discussing with his wife.     Person(s) involved in teaching:   Patient     Motivation Level:  Asks Questions: Yes  Eager to Learn: Yes  Cooperative: Yes  Receptive (willing/able to accept information): Yes  Any cultural factors/Uatsdin beliefs that may influence understanding or compliance? No     Patient demonstrates understanding of the following:  Reason for the appointment, diagnosis and treatment plan: Yes  Knowledge of proper use of medications and conditions for which they are ordered (with special attention to potential side effects or drug interactions): Yes  Which situations necessitate calling provider and whom to contact: Yes     Teaching Concerns Addressed: Discussed total joint online class. Patient understands he will need a preop exam within 30 days of date of surgery. He understands the expected length of stay and the expectation of outpatient physical therapy. Discussed dental prophylaxis as well as the need for COVID testing 4 days prior to surgery. Discussed the GetWell Loop.     Proper use and care of assistive devices (medical equip, care aids, etc.): Yes  Nutritional needs and diet plan: Yes  Pain management techniques: Yes  Wound Care: Yes  How and/when to access community resources: Yes     Instructional Materials Used/Given: Preoperative teaching packet, surgical soap x2, dental card, total joint class booklet

## 2021-11-04 NOTE — NURSING NOTE
"Reason For Visit:   Chief Complaint   Patient presents with     Consult     Left hip pain, has been having pain in the posterior and anterior hip, is coming in to discuss a ARLEEN        Ht 1.759 m (5' 9.25\")   Wt 73.5 kg (162 lb)   BMI 23.75 kg/m           Feliz Sumner ATC  "

## 2021-11-04 NOTE — LETTER
11/4/2021         RE: Lane Bosch  [address] [facility] [address]        Dear Colleague,    Thank you for referring your patient, Lane Bosch, to the [facility] [place]. Please see a copy of my visit note below.    Assessment: This is a 56 year old with advnaced left hip osteoarthritis associated with severe symptoms that limit his ADLs. Had a good response to intra-articular steroid from a diagnostic standpoint. We discussed the diagnosis and the treatment options including living with it and total hip. We spent twenty minutes discussing total hip arthroplasty.  We discussed the implants, the procedure, the risks and benefits, and the post-operative course.  We discussed blood clots, blood clots to the lungs, injury to blood vessels and nerves, dislocation, infection, and leg length difference. We discussed that some of his favored activities will be limited due to the motion limitations of common implants. All the patients questions were answered to the best of my ability.    Plan:  Left total hip when the patient chooses to go forward with it. Appropriate to consider RAP. Would  against discharge to home due to past history of requiring bladder catheterization.    Chief Complaint: Consult (Left hip pain, has been having pain in the posterior and anterior hip, is coming in to discuss a ARLEEN )      Physician:  No ref. provider found    HPI: Lane Bosch is a 56 year old male who presents today for evaluation of    Symptom Profile  Location of symptoms:  Groin and buttock (75%)  Onset: insidious  Trend: getting worse   Duration of symptoms: 4-5 years   Quality of symptoms: aching, sharp/stabbing  Severity: severe  Alleviate:activitiy modification   Exacerbating: activities (mountain cli,bers were how he first noticed)  Previous Treatments: Previous treatments include activity modification, oral pain medication,     inrta-articular steroid injection with  good relief of symptoms.   ALVERTO Jr: 46.45    MEDICAL HISTORY:   Past Medical History:   Diagnosis Date     Hypertension        Medications:     Current Outpatient Medications:      diclofenac (VOLTAREN) 75 MG EC tablet, Take 1 tablet (75 mg) by mouth 2 times daily, Disp: 60 tablet, Rfl: 1     ketorolac (TORADOL) 10 MG tablet, Take 1 tablet (10 mg) by mouth every 6 hours as needed for moderate pain, Disp: 20 tablet, Rfl: 0     lisinopril (ZESTRIL) 20 MG tablet, Take 1 tablet (20 mg) by mouth daily, Disp: 90 tablet, Rfl: 3     methocarbamol (ROBAXIN) 750 MG tablet, Take 1 tablet (750 mg) by mouth 3 times daily as needed for muscle spasms, Disp: 90 tablet, Rfl: 3     triamcinolone (KENALOG) 0.1 % paste, Take by mouth 2 times daily, Disp: 30 g, Rfl: 1    Current Facility-Administered Medications:      ropivacaine (NAROPIN) injection 3 mL, 3 mL, , , Romero Stark, DO, 3 mL at 10/05/21 1200     triamcinolone (KENALOG-40) injection 40 mg, 40 mg, , , Romero Stark DO, 40 mg at 10/05/21 1200    Allergies: Patient has no known allergies.    SURGICAL HISTORY:   Past Surgical History:   Procedure Laterality Date     C CYSTOTOMY,REPAIR URETEROCELE  2015     COLONOSCOPY N/A 10/5/2020    Procedure: COLONOSCOPY, WITH POLYPECTOMY AND BIOPSY;  Surgeon: Jackie Ruiz MD;  Location: [facility]     HEMILAMINECTOMY, DISCECTOMY LUMBAR ONE LEVEL, COMBINED  12/13/2012     L4-5   p[rimarly leg symptoms     FAMILY HISTORY:   Family History   Problem Relation Age of Onset     Prostate Cancer Father      Other Cancer Father         pancreatic     Prostate Cancer Paternal Grandfather      Other - See Comments Son         cleft palete       SOCIAL HISTORY:   Social History     Tobacco Use     Smoking status: Never Smoker     Smokeless tobacco: Never Used   Substance Use Topics     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  No  "fever, weight change or fatigue. No dry eyes. No oral ulcers, sore throat or voice change. No palpitations, syncope, angina or edema.  No chest pain, excessive sleepiness, shortness of breath or hemoptysis.   No abdominal pain, nausea, vomiting, diarrhea or heartburn.  No skin rash. No focal weakness or numbness. No bleeding or lymphadenopathy. No rhinitis or hives.     Exam:  On physical examination the patient appears the stated age, is in no acute distress, affectThe is appropriate, and breathing is non-labored.  Vitals are documented in the EMR and have been reviewed:    Ht 1.759 m (5' 9.25\")   Wt 73.5 kg (162 lb)   BMI 23.75 kg/m    5' 9.25\"  Body mass index is 23.75 kg/m .    Rises from chair:  Gait:  Trendelenburg test:  Gains the exam table:    RIGHT hip subjective:   Abd: 25  Add:10  PFC:  Flexion: 90  IRF:0  ERF: 25  Impingement test: -    LEFT hip subjective: a little irritated   Abd: 20  Add:10  PFC:  Flexion: 55  IRF: -5  ERF:15  Impingement test: +    Distally, the circulatory, motor, and sensation exam is intact with 5/5 EHL, gastroc-soleus, and tibialis anterior.  Sensation to light touch is intact.  Dorsalis pedis and posterior tibialis pulses are palpable.  There are no sores on the feet, no bruising, and no lymphedema.    X-rays:   Study Result    Narrative & Impression   PELVIS AND LEFT HIP, ONE VIEW   10/5/2021 11:31 AM      HISTORY:  Chronic left hip pain.     FINDINGS: Lower lumbar spine degenerative change. Right hip  osteoarthritis with mild-moderate joint space narrowing  superolaterally.                                                                      IMPRESSION: Left hip osteoarthritis with prominent joint space  narrowing superolaterally. No fracture identified.      JIMMIE JANG MD         SYSTEM ID:  [email or web address]       "

## 2021-11-05 ENCOUNTER — TELEPHONE (OUTPATIENT)
Dept: ORTHOPEDICS | Facility: CLINIC | Age: 56
End: 2021-11-05

## 2021-11-05 NOTE — TELEPHONE ENCOUNTER
Patient is scheduled for surgery with Dr. Rhoades    Spoke with: Clarke    Date of Surgery: 3/16/2022    Location: [place]    Informed patient they will need an adult  yes    Pre op with Provider n/a    H&P: Scheduled with pcp    Pre-procedure COVID-19 Test: [place] on 3/14/2022    Additional imaging/appointments: n/a    Surgery packet: Given in clinic     Additional comments: n/a

## 2021-11-08 DIAGNOSIS — M25.552 LEFT HIP PAIN: Primary | ICD-10-CM

## 2021-12-28 ENCOUNTER — MYC MEDICAL ADVICE (OUTPATIENT)
Dept: ORTHOPEDICS | Facility: CLINIC | Age: 56
End: 2021-12-28
Payer: COMMERCIAL

## 2021-12-28 NOTE — TELEPHONE ENCOUNTER
Patient is scheduled for follow up with Dr Stark on 1/11/2022 for possible repeat left hip intra-articular steroid injection.  Reviewing patient's chart he is currently scheduled for left total hip arthroplasty with Dr Rhoades on 3/16/2022.    Dr Rhoades's team please advise on timing on repeat left hip intra-articular steroid injection prior to total hip arthroplasty.    Feliz Tse, ATC

## 2021-12-28 NOTE — TELEPHONE ENCOUNTER
Responded via staff message. January 11, 2022 would be too close for a steroid injection into the left hip. Dr. Rhoades requires 3 months to have elapsed from last injection before doing surgery.

## 2022-02-15 DIAGNOSIS — Z11.59 ENCOUNTER FOR SCREENING FOR OTHER VIRAL DISEASES: Primary | ICD-10-CM

## 2022-02-18 NOTE — PATIENT INSTRUCTIONS
Post-op Urinary Retention - [employer] - [facility] (nih.gov)     1. To lab     Medications on the day of surgery. Ok to take with sip of water the morning of surgery.    No NSAIDs (ibuprofen, advil, aleve, aspirin) 7 days before surgery.    OK to take tylenol.    Preparing for Your Surgery  Getting started  A nurse will call you to review your health history and instructions. They will give you an arrival time based on your scheduled surgery time. Please be ready to share:    Your doctor's clinic name and phone number    Your medical, surgical and anesthesia history    A list of allergies and sensitivities    A list of medicines, including herbal treatments and over-the-counter drugs    Whether the patient has a legal guardian (ask how to send us the papers in advance)  Please tell us if you're pregnant--or if there's any chance you might be pregnant. Some surgeries may injure a fetus (unborn baby), so they require a pregnancy test. Surgeries that are safe for a fetus don't always need a test, and you can choose whether to have one.   If you have a child who's having surgery, please ask for a copy of Preparing for Your Child's Surgery.    Preparing for surgery    Within 30 days of surgery: Have a pre-op exam (sometimes called an H&P, or History and Physical). This can be done at a clinic or pre-operative center.  ? If you're having a , you may not need this exam. Talk to your care team.    At your pre-op exam, talk to your care team about all medicines you take. If you need to stop any medicines before surgery, ask when to start taking them again.  ? We do this for your safety. Many medicines can make you bleed too much during surgery. Some change how well surgery (anesthesia) drugs work.    Call your insurance company to let them know you're having surgery. (If you don't have insurance, call [phone].)    Call your clinic if there's any change in your health. This includes signs of a cold or flu  (sore throat, runny nose, cough, rash, fever). It also includes a scrape or scratch near the surgery site.    If you have questions on the day of surgery, call your hospital or surgery center.  COVID testing  You may need to be tested for COVID-19 before having surgery. If so, your surgical team will give you instructions for scheduling this test, separate from your preoperative history and physical.  Eating and drinking guidelines  For your safety: Unless your surgeon tells you otherwise, follow the guidelines below.    Eat and drink as usual until 8 hours before surgery. After that, no food or milk.    Drink clear liquids until 2 hours before surgery. These are liquids you can see through, like water, Gatorade and Propel Water. You may also have black coffee and tea (no cream or milk).    Nothing by mouth within 2 hours of surgery. This includes gum, candy and breath mints.    If you drink alcohol: Stop drinking it the night before surgery.    If your care team tells you to take medicine on the morning of surgery, it's okay to take it with a sip of water.  Preventing infection    Shower or bathe the night before and morning of your surgery. Follow the instructions your clinic gave you. (If no instructions, use regular soap.)    Don't shave or clip hair near your surgery site. We'll remove the hair if needed.    Don't smoke or vape the morning of surgery. You may chew nicotine gum up to 2 hours before surgery. A nicotine patch is okay.  ? Note: Some surgeries require you to completely quit smoking and nicotine. Check with your surgeon.    Your care team will make every effort to keep you safe from infection. We will:  ? Clean our hands often with soap and water (or an alcohol-based hand rub).  ? Clean the skin at your surgery site with a special soap that kills germs.  ? Give you a special gown to keep you warm. (Cold raises the risk of infection.)  ? Wear special hair covers, masks, gowns and gloves during  surgery.  ? Give antibiotic medicine, if prescribed. Not all surgeries need antibiotics.  What to bring on the day of surgery    Photo ID and insurance card    Copy of your health care directive, if you have one    Glasses and hearing aides (bring cases)  ? You can't wear contacts during surgery    Inhaler and eye drops, if you use them (tell us about these when you arrive)    CPAP machine or breathing device, if you use them    A few personal items, if spending the night    If you have . . .  ? A pacemaker, ICD (cardiac defibrillator) or other implant: Bring the ID card.  ? An implanted stimulator: Bring the remote control.  ? A legal guardian: Bring a copy of the certified (court-stamped) guardianship papers.  Please remove any jewelry, including body piercings. Leave jewelry and other valuables at home.  If you're going home the day of surgery    You must have a responsible adult drive you home. They should stay with you overnight as well.    If you don't have someone to stay with you, and you aren't safe to go home alone, we may keep you overnight. Insurance often won't pay for this.  After surgery  If it's hard to control your pain or you need more pain medicine, please call your surgeon's office.  Questions?   If you have any questions for your care team, list them here: _________________________________________________________________________________________________________________________________________________________________________ ____________________________________ ____________________________________ ____________________________________  For informational purposes only. Not to replace the advice of your health care provider. Copyright   2003, 2019 [facility]. All rights reserved. Clinically reviewed by Delaney Piña MD. [employer] [id number] - REV 07/21.

## 2022-02-22 ENCOUNTER — OFFICE VISIT (OUTPATIENT)
Dept: FAMILY MEDICINE | Facility: CLINIC | Age: 57
End: 2022-02-22
Payer: COMMERCIAL

## 2022-02-22 ENCOUNTER — MYC MEDICAL ADVICE (OUTPATIENT)
Dept: FAMILY MEDICINE | Facility: CLINIC | Age: 57
End: 2022-02-22

## 2022-02-22 VITALS
HEIGHT: 69 IN | RESPIRATION RATE: 16 BRPM | HEART RATE: 53 BPM | WEIGHT: 163 LBS | TEMPERATURE: 97.1 F | DIASTOLIC BLOOD PRESSURE: 68 MMHG | OXYGEN SATURATION: 99 % | BODY MASS INDEX: 24.14 KG/M2 | SYSTOLIC BLOOD PRESSURE: 124 MMHG

## 2022-02-22 DIAGNOSIS — Z12.5 SCREENING FOR PROSTATE CANCER: ICD-10-CM

## 2022-02-22 DIAGNOSIS — I10 BENIGN ESSENTIAL HYPERTENSION: ICD-10-CM

## 2022-02-22 DIAGNOSIS — M16.12 PRIMARY OSTEOARTHRITIS OF LEFT HIP: ICD-10-CM

## 2022-02-22 DIAGNOSIS — Z23 NEED FOR PROPHYLACTIC VACCINATION AND INOCULATION AGAINST INFLUENZA: ICD-10-CM

## 2022-02-22 DIAGNOSIS — Z01.818 PREOP GENERAL PHYSICAL EXAM: Primary | ICD-10-CM

## 2022-02-22 LAB
ANION GAP SERPL CALCULATED.3IONS-SCNC: <1 MMOL/L (ref 3–14)
BUN SERPL-MCNC: 20 MG/DL (ref 7–30)
CALCIUM SERPL-MCNC: 9 MG/DL (ref 8.5–10.1)
CHLORIDE BLD-SCNC: 109 MMOL/L (ref 94–109)
CO2 SERPL-SCNC: 30 MMOL/L (ref 20–32)
CREAT SERPL-MCNC: 0.8 MG/DL (ref 0.66–1.25)
GFR SERPL CREATININE-BSD FRML MDRD: >90 ML/MIN/1.73M2
GLUCOSE BLD-MCNC: 94 MG/DL (ref 70–99)
POTASSIUM BLD-SCNC: 3.9 MMOL/L (ref 3.4–5.3)
PSA SERPL-MCNC: 1.89 UG/L (ref 0–4)
SODIUM SERPL-SCNC: 139 MMOL/L (ref 133–144)

## 2022-02-22 PROCEDURE — 80048 BASIC METABOLIC PNL TOTAL CA: CPT | Performed by: FAMILY MEDICINE

## 2022-02-22 PROCEDURE — 90682 RIV4 VACC RECOMBINANT DNA IM: CPT | Performed by: FAMILY MEDICINE

## 2022-02-22 PROCEDURE — 90471 IMMUNIZATION ADMIN: CPT | Performed by: FAMILY MEDICINE

## 2022-02-22 PROCEDURE — 99214 OFFICE O/P EST MOD 30 MIN: CPT | Mod: 25 | Performed by: FAMILY MEDICINE

## 2022-02-22 PROCEDURE — G0103 PSA SCREENING: HCPCS | Performed by: FAMILY MEDICINE

## 2022-02-22 PROCEDURE — 36415 COLL VENOUS BLD VENIPUNCTURE: CPT | Performed by: FAMILY MEDICINE

## 2022-02-22 NOTE — H&P (VIEW-ONLY)
[facility]  [address] [address]  Phone: [phone]  Primary Provider: Dari Feliciano  Pre-op Performing Provider: DARI FELICIANO    PREOPERATIVE EVALUATION:  Today's date: 2/22/2022    Lane Bosch is a 56 year old male who presents for a preoperative evaluation.    Surgical Information:  Surgery/Procedure: ARTHROPLASTY, HIP, TOTAL LEFT  Surgery Location: [facility]  Surgeon: Dr. Rhoades  Surgery Date: 3/16/2022  Time of Surgery: 7:30 AM  Where patient plans to recover: Other: Will be staying at the hospital for one night.  Fax number for surgical facility: Note does not need to be faxed, will be available electronically in Epic.    Type of Anesthesia Anticipated: Choice    Assessment & Plan     The proposed surgical procedure is considered INTERMEDIATE risk.    Preop general physical exam  - Basic metabolic panel  (Ca, Cl, CO2, Creat, Gluc, K, Na, BUN); Future    Primary osteoarthritis of left hip  Failed conservative management, plan surgery    Need for prophylactic vaccination and inoculation against influenza  - INFLUENZA QUAD, RECOMBINANT, P-FREE (RIV4) (FLUBLOK)    Benign essential hypertension: stable, check electrolytes and kidney functions  - Basic metabolic panel  (Ca, Cl, CO2, Creat, Gluc, K, Na, BUN); Future         Risks and Recommendations:  The patient has the following additional risks and recommendations for perioperative complications:   - History of urinary retention following surgery in the past.  Discussed considering the flomax 3 days daily before surgery.    Medication Instructions:  Patient is to take all scheduled medications on the day of surgery EXCEPT for modifications listed below:   - ibuprofen (Advil, Motrin): HOLD 1 day before surgery.     RECOMMENDATION:  APPROVAL GIVEN to proceed with proposed procedure, without further diagnostic evaluation.        Subjective     HPI related to upcoming procedure: Left hip  pain for few years now. Limping now. At night the pain is the worst. Had cortisone injection with some short term help. Decreased activity previously very active and the hip pain is limiting activity. Tried Physical therapy.  IBP when needed for pain with only short term relief.   Failed conservative management.      Preop Questions 2/22/2022   1. Have you ever had a heart attack or stroke? No   2. Have you ever had surgery on your heart or blood vessels, such as a stent placement, a coronary artery bypass, or surgery on an artery in your head, neck, heart, or legs? No   3. Do you have chest pain with activity? No   4. Do you have a history of  heart failure? No   5. Do you currently have a cold, bronchitis or symptoms of other infection? No   6. Do you have a cough, shortness of breath, or wheezing? No   7. Do you or anyone in your family have previous history of blood clots? No   8. Do you or does anyone in your family have a serious bleeding problem such as prolonged bleeding following surgeries or cuts? No   9. Have you ever had problems with anemia or been told to take iron pills? No   10. Have you had any abnormal blood loss such as black, tarry or bloody stools? No   11. Have you ever had a blood transfusion? No   12. Are you willing to have a blood transfusion if it is medically needed before, during, or after your surgery? Yes   13. Have you or any of your relatives ever had problems with anesthesia? No   14. Do you have sleep apnea, excessive snoring or daytime drowsiness? No   15. Do you have any artifical heart valves or other implanted medical devices like a pacemaker, defibrillator, or continuous glucose monitor? No   16. Do you have artificial joints? No   17. Are you allergic to latex? No     Health Care Directive:  Patient does not have a Health Care Directive or Living Will: Discussed advance care planning with patient; information given to patient to review.    Preoperative Review of :    reviewed - no record of controlled substances prescribed.      Status of Chronic Conditions:  HYPERTENSION - Patient has longstanding history of HTN treated with lisinopril, currently denies any symptoms referable to elevated blood pressure. Specifically denies chest pain, palpitations, dyspnea, orthopnea, PND or peripheral edema. Blood pressure readings have been in normal range. Current medication regimen is as listed below. Patient denies any side effects of medication.       Review of Systems  CONSTITUTIONAL: NEGATIVE for fever, chills, change in weight  INTEGUMENTARY/SKIN: NEGATIVE for worrisome rashes, moles or lesions  EYES: NEGATIVE for vision changes or irritation  ENT/MOUTH: NEGATIVE for ear, mouth and throat problems  RESP: NEGATIVE for significant cough or SOB  CV: NEGATIVE for chest pain, palpitations or peripheral edema  GI: NEGATIVE for nausea, abdominal pain, heartburn, or change in bowel habits  : NEGATIVE for frequency, dysuria, or hematuria  MUSCULOSKELETAL: NEGATIVE for significant arthralgias or myalgia  NEURO: NEGATIVE for weakness, dizziness or paresthesias  ENDOCRINE: NEGATIVE for temperature intolerance, skin/hair changes  HEME: NEGATIVE for bleeding problems  PSYCHIATRIC: NEGATIVE for changes in mood or affect    Patient Active Problem List    Diagnosis Date Noted     Advanced directives, counseling/discussion 11/16/2018     Priority: Medium     Patient states has a health care directive at home, will bring in to be scanned. Violet Cox CMA           Benign essential hypertension 11/17/2017     Priority: Medium     Actinic keratosis 11/17/2017     Priority: Medium      Past Medical History:   Diagnosis Date     Hypertension      Past Surgical History:   Procedure Laterality Date     COLONOSCOPY N/A 10/5/2020    Procedure: COLONOSCOPY, WITH POLYPECTOMY AND BIOPSY;  Surgeon: Jackie Ruiz MD;  Location: [facility]     HEMILAMINECTOMY, DISCECTOMY LUMBAR ONE LEVEL, COMBINED  12/13/2012     L4-5  "    Z CYSTOTOMY,REPAIR URETEROCELE  2015     Current Outpatient Medications   Medication Sig Dispense Refill     lisinopril (ZESTRIL) 20 MG tablet Take 1 tablet (20 mg) by mouth daily 90 tablet 3       No Known Allergies     Social History     Tobacco Use     Smoking status: Never Smoker     Smokeless tobacco: Never Used   Substance Use Topics     Alcohol use: No       History   Drug Use No         Objective     /68   Pulse 53   Temp 97.1  F (36.2  C) (Tympanic)   Resp 16   Ht 1.745 m (5' 8.7\")   Wt 73.9 kg (163 lb)   SpO2 99%   BMI 24.28 kg/m      Physical Exam    GENERAL APPEARANCE: healthy, alert and no distress     EYES: EOMI,  PERRL     HENT: ear canals and TM's normal and nose and mouth without ulcers or lesions     NECK: no adenopathy, no asymmetry, masses, or scars and thyroid normal to palpation     RESP: lungs clear to auscultation - no rales, rhonchi or wheezes     CV: regular rates and rhythm, normal S1 S2, no S3 or S4 and no murmur, click or rub     ABDOMEN:  soft, nontender, no HSM or masses and bowel sounds normal     MS: extremities normal- no gross deformities noted, no evidence of inflammation in joints, FROM in all extremities.     SKIN: no suspicious lesions or rashes     NEURO: Normal strength and tone, sensory exam grossly normal, mentation intact and speech normal     PSYCH: mentation appears normal. and affect normal/bright     LYMPHATICS: No cervical adenopathy    Recent Labs   Lab Test 09/08/20  0932      POTASSIUM 3.9   CR 0.75        Diagnostics:  Labs pending at this time.  Results will be reviewed when available.   No EKG required, no history of coronary heart disease, significant arrhythmia, peripheral arterial disease or other structural heart disease.   Able to preform well over 4 METS    Revised Cardiac Risk Index (RCRI):  The patient has the following serious cardiovascular risks for perioperative complications:   - No serious cardiac risks = 0 points     RCRI " Interpretation: 0 points: Class I (very low risk - 0.4% complication rate)           Signed Electronically by: Franc Mckeon MD  Copy of this evaluation report is provided to requesting physician.

## 2022-02-22 NOTE — PROGRESS NOTES
[facility]  [address] [address]  Phone: [phone]  Primary Provider: Dari Feliciano  Pre-op Performing Provider: DARI FELICIANO    PREOPERATIVE EVALUATION:  Today's date: 2/22/2022    Lane Bosch is a 56 year old male who presents for a preoperative evaluation.    Surgical Information:  Surgery/Procedure: ARTHROPLASTY, HIP, TOTAL LEFT  Surgery Location: [facility]  Surgeon: Dr. Rhoades  Surgery Date: 3/16/2022  Time of Surgery: 7:30 AM  Where patient plans to recover: Other: Will be staying at the hospital for one night.  Fax number for surgical facility: Note does not need to be faxed, will be available electronically in Epic.    Type of Anesthesia Anticipated: Choice    Assessment & Plan     The proposed surgical procedure is considered INTERMEDIATE risk.    Preop general physical exam  - Basic metabolic panel  (Ca, Cl, CO2, Creat, Gluc, K, Na, BUN); Future    Primary osteoarthritis of left hip  Failed conservative management, plan surgery    Need for prophylactic vaccination and inoculation against influenza  - INFLUENZA QUAD, RECOMBINANT, P-FREE (RIV4) (FLUBLOK)    Benign essential hypertension: stable, check electrolytes and kidney functions  - Basic metabolic panel  (Ca, Cl, CO2, Creat, Gluc, K, Na, BUN); Future         Risks and Recommendations:  The patient has the following additional risks and recommendations for perioperative complications:   - History of urinary retention following surgery in the past.  Discussed considering the flomax 3 days daily before surgery.    Medication Instructions:  Patient is to take all scheduled medications on the day of surgery EXCEPT for modifications listed below:   - ibuprofen (Advil, Motrin): HOLD 1 day before surgery.     RECOMMENDATION:  APPROVAL GIVEN to proceed with proposed procedure, without further diagnostic evaluation.        Subjective     HPI related to upcoming procedure: Left hip  pain for few years now. Limping now. At night the pain is the worst. Had cortisone injection with some short term help. Decreased activity previously very active and the hip pain is limiting activity. Tried Physical therapy.  IBP when needed for pain with only short term relief.   Failed conservative management.      Preop Questions 2/22/2022   1. Have you ever had a heart attack or stroke? No   2. Have you ever had surgery on your heart or blood vessels, such as a stent placement, a coronary artery bypass, or surgery on an artery in your head, neck, heart, or legs? No   3. Do you have chest pain with activity? No   4. Do you have a history of  heart failure? No   5. Do you currently have a cold, bronchitis or symptoms of other infection? No   6. Do you have a cough, shortness of breath, or wheezing? No   7. Do you or anyone in your family have previous history of blood clots? No   8. Do you or does anyone in your family have a serious bleeding problem such as prolonged bleeding following surgeries or cuts? No   9. Have you ever had problems with anemia or been told to take iron pills? No   10. Have you had any abnormal blood loss such as black, tarry or bloody stools? No   11. Have you ever had a blood transfusion? No   12. Are you willing to have a blood transfusion if it is medically needed before, during, or after your surgery? Yes   13. Have you or any of your relatives ever had problems with anesthesia? No   14. Do you have sleep apnea, excessive snoring or daytime drowsiness? No   15. Do you have any artifical heart valves or other implanted medical devices like a pacemaker, defibrillator, or continuous glucose monitor? No   16. Do you have artificial joints? No   17. Are you allergic to latex? No     Health Care Directive:  Patient does not have a Health Care Directive or Living Will: Discussed advance care planning with patient; information given to patient to review.    Preoperative Review of :    reviewed - no record of controlled substances prescribed.      Status of Chronic Conditions:  HYPERTENSION - Patient has longstanding history of HTN treated with lisinopril, currently denies any symptoms referable to elevated blood pressure. Specifically denies chest pain, palpitations, dyspnea, orthopnea, PND or peripheral edema. Blood pressure readings have been in normal range. Current medication regimen is as listed below. Patient denies any side effects of medication.       Review of Systems  CONSTITUTIONAL: NEGATIVE for fever, chills, change in weight  INTEGUMENTARY/SKIN: NEGATIVE for worrisome rashes, moles or lesions  EYES: NEGATIVE for vision changes or irritation  ENT/MOUTH: NEGATIVE for ear, mouth and throat problems  RESP: NEGATIVE for significant cough or SOB  CV: NEGATIVE for chest pain, palpitations or peripheral edema  GI: NEGATIVE for nausea, abdominal pain, heartburn, or change in bowel habits  : NEGATIVE for frequency, dysuria, or hematuria  MUSCULOSKELETAL: NEGATIVE for significant arthralgias or myalgia  NEURO: NEGATIVE for weakness, dizziness or paresthesias  ENDOCRINE: NEGATIVE for temperature intolerance, skin/hair changes  HEME: NEGATIVE for bleeding problems  PSYCHIATRIC: NEGATIVE for changes in mood or affect    Patient Active Problem List    Diagnosis Date Noted     Advanced directives, counseling/discussion 11/16/2018     Priority: Medium     Patient states has a health care directive at home, will bring in to be scanned. Violet Cox CMA           Benign essential hypertension 11/17/2017     Priority: Medium     Actinic keratosis 11/17/2017     Priority: Medium      Past Medical History:   Diagnosis Date     Hypertension      Past Surgical History:   Procedure Laterality Date     COLONOSCOPY N/A 10/5/2020    Procedure: COLONOSCOPY, WITH POLYPECTOMY AND BIOPSY;  Surgeon: Jackie Ruiz MD;  Location: [facility]     HEMILAMINECTOMY, DISCECTOMY LUMBAR ONE LEVEL, COMBINED  12/13/2012     L4-5  "    Z CYSTOTOMY,REPAIR URETEROCELE  2015     Current Outpatient Medications   Medication Sig Dispense Refill     lisinopril (ZESTRIL) 20 MG tablet Take 1 tablet (20 mg) by mouth daily 90 tablet 3       No Known Allergies     Social History     Tobacco Use     Smoking status: Never Smoker     Smokeless tobacco: Never Used   Substance Use Topics     Alcohol use: No       History   Drug Use No         Objective     /68   Pulse 53   Temp 97.1  F (36.2  C) (Tympanic)   Resp 16   Ht 1.745 m (5' 8.7\")   Wt 73.9 kg (163 lb)   SpO2 99%   BMI 24.28 kg/m      Physical Exam    GENERAL APPEARANCE: healthy, alert and no distress     EYES: EOMI,  PERRL     HENT: ear canals and TM's normal and nose and mouth without ulcers or lesions     NECK: no adenopathy, no asymmetry, masses, or scars and thyroid normal to palpation     RESP: lungs clear to auscultation - no rales, rhonchi or wheezes     CV: regular rates and rhythm, normal S1 S2, no S3 or S4 and no murmur, click or rub     ABDOMEN:  soft, nontender, no HSM or masses and bowel sounds normal     MS: extremities normal- no gross deformities noted, no evidence of inflammation in joints, FROM in all extremities.     SKIN: no suspicious lesions or rashes     NEURO: Normal strength and tone, sensory exam grossly normal, mentation intact and speech normal     PSYCH: mentation appears normal. and affect normal/bright     LYMPHATICS: No cervical adenopathy    Recent Labs   Lab Test 09/08/20  0932      POTASSIUM 3.9   CR 0.75        Diagnostics:  Labs pending at this time.  Results will be reviewed when available.   No EKG required, no history of coronary heart disease, significant arrhythmia, peripheral arterial disease or other structural heart disease.   Able to preform well over 4 METS    Revised Cardiac Risk Index (RCRI):  The patient has the following serious cardiovascular risks for perioperative complications:   - No serious cardiac risks = 0 points     RCRI " Interpretation: 0 points: Class I (very low risk - 0.4% complication rate)           Signed Electronically by: Franc Mckeon MD  Copy of this evaluation report is provided to requesting physician.

## 2022-02-22 NOTE — TELEPHONE ENCOUNTER
Routing to Provider to review and advise.     Refer to patients mychart message.     Doris Kay RN BSN

## 2022-03-14 ENCOUNTER — LAB (OUTPATIENT)
Dept: LAB | Facility: CLINIC | Age: 57
End: 2022-03-14
Payer: COMMERCIAL

## 2022-03-14 DIAGNOSIS — Z11.59 ENCOUNTER FOR SCREENING FOR OTHER VIRAL DISEASES: ICD-10-CM

## 2022-03-14 PROCEDURE — U0005 INFEC AGEN DETEC AMPLI PROBE: HCPCS

## 2022-03-14 PROCEDURE — U0003 INFECTIOUS AGENT DETECTION BY NUCLEIC ACID (DNA OR RNA); SEVERE ACUTE RESPIRATORY SYNDROME CORONAVIRUS 2 (SARS-COV-2) (CORONAVIRUS DISEASE [COVID-19]), AMPLIFIED PROBE TECHNIQUE, MAKING USE OF HIGH THROUGHPUT TECHNOLOGIES AS DESCRIBED BY CMS-2020-01-R: HCPCS

## 2022-03-15 ENCOUNTER — ANESTHESIA EVENT (OUTPATIENT)
Dept: SURGERY | Facility: CLINIC | Age: 57
End: 2022-03-15
Payer: COMMERCIAL

## 2022-03-15 LAB — SARS-COV-2 RNA RESP QL NAA+PROBE: NEGATIVE

## 2022-03-16 ENCOUNTER — APPOINTMENT (OUTPATIENT)
Dept: PHYSICAL THERAPY | Facility: CLINIC | Age: 57
End: 2022-03-16
Attending: ORTHOPAEDIC SURGERY
Payer: COMMERCIAL

## 2022-03-16 ENCOUNTER — HOSPITAL ENCOUNTER (OUTPATIENT)
Facility: CLINIC | Age: 57
Discharge: HOME | End: 2022-03-18
Attending: ORTHOPAEDIC SURGERY | Admitting: ORTHOPAEDIC SURGERY
Payer: COMMERCIAL

## 2022-03-16 ENCOUNTER — ANESTHESIA (OUTPATIENT)
Dept: SURGERY | Facility: CLINIC | Age: 57
End: 2022-03-16
Payer: COMMERCIAL

## 2022-03-16 ENCOUNTER — APPOINTMENT (OUTPATIENT)
Dept: GENERAL RADIOLOGY | Facility: CLINIC | Age: 57
End: 2022-03-16
Attending: ORTHOPAEDIC SURGERY
Payer: COMMERCIAL

## 2022-03-16 DIAGNOSIS — M16.12 PRIMARY OSTEOARTHRITIS OF LEFT HIP: Primary | ICD-10-CM

## 2022-03-16 LAB — GLUCOSE BLDC GLUCOMTR-MCNC: 85 MG/DL (ref 70–99)

## 2022-03-16 PROCEDURE — 250N000011 HC RX IP 250 OP 636: Performed by: ORTHOPAEDIC SURGERY

## 2022-03-16 PROCEDURE — 999N000065 XR PELVIS AND HIP LEFT 2 VIEWS

## 2022-03-16 PROCEDURE — 710N000010 HC RECOVERY PHASE 1, LEVEL 2, PER MIN: Performed by: ORTHOPAEDIC SURGERY

## 2022-03-16 PROCEDURE — C1776 JOINT DEVICE (IMPLANTABLE): HCPCS | Performed by: ORTHOPAEDIC SURGERY

## 2022-03-16 PROCEDURE — 250N000011 HC RX IP 250 OP 636: Performed by: NURSE ANESTHETIST, CERTIFIED REGISTERED

## 2022-03-16 PROCEDURE — 250N000013 HC RX MED GY IP 250 OP 250 PS 637: Performed by: ORTHOPAEDIC SURGERY

## 2022-03-16 PROCEDURE — 999N000141 HC STATISTIC PRE-PROCEDURE NURSING ASSESSMENT: Performed by: ORTHOPAEDIC SURGERY

## 2022-03-16 PROCEDURE — 360N000077 HC SURGERY LEVEL 4, PER MIN: Performed by: ORTHOPAEDIC SURGERY

## 2022-03-16 PROCEDURE — 250N000013 HC RX MED GY IP 250 OP 250 PS 637: Performed by: ANESTHESIOLOGY

## 2022-03-16 PROCEDURE — 258N000003 HC RX IP 258 OP 636: Performed by: ORTHOPAEDIC SURGERY

## 2022-03-16 PROCEDURE — 250N000011 HC RX IP 250 OP 636: Performed by: STUDENT IN AN ORGANIZED HEALTH CARE EDUCATION/TRAINING PROGRAM

## 2022-03-16 PROCEDURE — 258N000001 HC RX 258: Performed by: ORTHOPAEDIC SURGERY

## 2022-03-16 PROCEDURE — 272N000001 HC OR GENERAL SUPPLY STERILE: Performed by: ORTHOPAEDIC SURGERY

## 2022-03-16 PROCEDURE — 258N000003 HC RX IP 258 OP 636: Performed by: NURSE ANESTHETIST, CERTIFIED REGISTERED

## 2022-03-16 PROCEDURE — 250N000011 HC RX IP 250 OP 636: Performed by: ANESTHESIOLOGY

## 2022-03-16 PROCEDURE — 97530 THERAPEUTIC ACTIVITIES: CPT | Mod: GP | Performed by: PHYSICAL THERAPIST

## 2022-03-16 PROCEDURE — 97110 THERAPEUTIC EXERCISES: CPT | Mod: GP | Performed by: PHYSICAL THERAPIST

## 2022-03-16 PROCEDURE — 250N000009 HC RX 250: Performed by: NURSE ANESTHETIST, CERTIFIED REGISTERED

## 2022-03-16 PROCEDURE — 97161 PT EVAL LOW COMPLEX 20 MIN: CPT | Mod: GP | Performed by: PHYSICAL THERAPIST

## 2022-03-16 PROCEDURE — 82962 GLUCOSE BLOOD TEST: CPT

## 2022-03-16 PROCEDURE — C1713 ANCHOR/SCREW BN/BN,TIS/BN: HCPCS | Performed by: ORTHOPAEDIC SURGERY

## 2022-03-16 PROCEDURE — 370N000017 HC ANESTHESIA TECHNICAL FEE, PER MIN: Performed by: ORTHOPAEDIC SURGERY

## 2022-03-16 PROCEDURE — 250N000013 HC RX MED GY IP 250 OP 250 PS 637: Performed by: STUDENT IN AN ORGANIZED HEALTH CARE EDUCATION/TRAINING PROGRAM

## 2022-03-16 PROCEDURE — 73502 X-RAY EXAM HIP UNI 2-3 VIEWS: CPT | Mod: 26 | Performed by: RADIOLOGY

## 2022-03-16 PROCEDURE — 27130 TOTAL HIP ARTHROPLASTY: CPT | Mod: LT | Performed by: ORTHOPAEDIC SURGERY

## 2022-03-16 PROCEDURE — 258N000003 HC RX IP 258 OP 636: Performed by: STUDENT IN AN ORGANIZED HEALTH CARE EDUCATION/TRAINING PROGRAM

## 2022-03-16 DEVICE — R3 0 DEGREE XLPE ACETABULAR LINER                                    36MM ID X OD 54MM
Type: IMPLANTABLE DEVICE | Site: HIP | Status: FUNCTIONAL
Brand: R3

## 2022-03-16 DEVICE — COBALT CHROME 12/14 TAPER FEMORAL                                    HEAD 36MM +4: Type: IMPLANTABLE DEVICE | Site: HIP | Status: FUNCTIONAL

## 2022-03-16 DEVICE — SYNERGY POROUS HIGH OFFSET FEMORAL                                    COMPONENT SZ 16
Type: IMPLANTABLE DEVICE | Site: HIP | Status: FUNCTIONAL
Brand: SYNERGY

## 2022-03-16 DEVICE — REFLECTION SPHERICAL HEAD SCREW 20MM
Type: IMPLANTABLE DEVICE | Site: HIP | Status: FUNCTIONAL
Brand: REFLECTION

## 2022-03-16 DEVICE — REFLECTION SPHERICAL HEAD SCREW 40MM
Type: IMPLANTABLE DEVICE | Site: HIP | Status: FUNCTIONAL
Brand: REFLECTION

## 2022-03-16 DEVICE — R3 3 HOLE ACETABULAR SHELL 54MM
Type: IMPLANTABLE DEVICE | Site: HIP | Status: FUNCTIONAL
Brand: R3 ACETABULAR

## 2022-03-16 RX ORDER — ACETAMINOPHEN 325 MG/1
975 TABLET ORAL EVERY 8 HOURS
Status: DISCONTINUED | OUTPATIENT
Start: 2022-03-16 | End: 2022-03-18 | Stop reason: HOSPADM

## 2022-03-16 RX ORDER — LIDOCAINE 40 MG/G
CREAM TOPICAL
Status: DISCONTINUED | OUTPATIENT
Start: 2022-03-16 | End: 2022-03-18 | Stop reason: HOSPADM

## 2022-03-16 RX ORDER — OXYCODONE HYDROCHLORIDE 5 MG/1
5 TABLET ORAL EVERY 4 HOURS PRN
Status: DISCONTINUED | OUTPATIENT
Start: 2022-03-16 | End: 2022-03-16 | Stop reason: HOSPADM

## 2022-03-16 RX ORDER — ONDANSETRON 4 MG/1
4 TABLET, ORALLY DISINTEGRATING ORAL EVERY 6 HOURS PRN
Status: DISCONTINUED | OUTPATIENT
Start: 2022-03-16 | End: 2022-03-18 | Stop reason: HOSPADM

## 2022-03-16 RX ORDER — SODIUM CHLORIDE, SODIUM LACTATE, POTASSIUM CHLORIDE, CALCIUM CHLORIDE 600; 310; 30; 20 MG/100ML; MG/100ML; MG/100ML; MG/100ML
INJECTION, SOLUTION INTRAVENOUS CONTINUOUS
Status: DISCONTINUED | OUTPATIENT
Start: 2022-03-16 | End: 2022-03-18 | Stop reason: HOSPADM

## 2022-03-16 RX ORDER — EPHEDRINE SULFATE 50 MG/ML
INJECTION, SOLUTION INTRAVENOUS PRN
Status: DISCONTINUED | OUTPATIENT
Start: 2022-03-16 | End: 2022-03-16

## 2022-03-16 RX ORDER — MEPERIDINE HYDROCHLORIDE 25 MG/ML
12.5 INJECTION INTRAMUSCULAR; INTRAVENOUS; SUBCUTANEOUS
Status: DISCONTINUED | OUTPATIENT
Start: 2022-03-16 | End: 2022-03-16 | Stop reason: HOSPADM

## 2022-03-16 RX ORDER — LISINOPRIL 20 MG/1
20 TABLET ORAL DAILY
Status: DISCONTINUED | OUTPATIENT
Start: 2022-03-17 | End: 2022-03-18 | Stop reason: HOSPADM

## 2022-03-16 RX ORDER — CELECOXIB 200 MG/1
400 CAPSULE ORAL ONCE
Status: COMPLETED | OUTPATIENT
Start: 2022-03-16 | End: 2022-03-16

## 2022-03-16 RX ORDER — HYDROXYZINE HYDROCHLORIDE 25 MG/1
25 TABLET, FILM COATED ORAL EVERY 6 HOURS PRN
Qty: 30 TABLET | Refills: 0 | Status: SHIPPED | OUTPATIENT
Start: 2022-03-16 | End: 2023-05-18

## 2022-03-16 RX ORDER — ONDANSETRON 2 MG/ML
4 INJECTION INTRAMUSCULAR; INTRAVENOUS EVERY 6 HOURS PRN
Status: DISCONTINUED | OUTPATIENT
Start: 2022-03-16 | End: 2022-03-18 | Stop reason: HOSPADM

## 2022-03-16 RX ORDER — ACETAMINOPHEN 325 MG/1
975 TABLET ORAL ONCE
Status: COMPLETED | OUTPATIENT
Start: 2022-03-16 | End: 2022-03-16

## 2022-03-16 RX ORDER — ONDANSETRON 4 MG/1
4 TABLET, ORALLY DISINTEGRATING ORAL EVERY 30 MIN PRN
Status: DISCONTINUED | OUTPATIENT
Start: 2022-03-16 | End: 2022-03-16 | Stop reason: HOSPADM

## 2022-03-16 RX ORDER — DEXAMETHASONE SODIUM PHOSPHATE 4 MG/ML
INJECTION, SOLUTION INTRA-ARTICULAR; INTRALESIONAL; INTRAMUSCULAR; INTRAVENOUS; SOFT TISSUE PRN
Status: DISCONTINUED | OUTPATIENT
Start: 2022-03-16 | End: 2022-03-16

## 2022-03-16 RX ORDER — OXYCODONE HYDROCHLORIDE 5 MG/1
10 TABLET ORAL EVERY 4 HOURS PRN
Status: DISCONTINUED | OUTPATIENT
Start: 2022-03-16 | End: 2022-03-18 | Stop reason: HOSPADM

## 2022-03-16 RX ORDER — BISACODYL 10 MG
10 SUPPOSITORY, RECTAL RECTAL DAILY PRN
Status: DISCONTINUED | OUTPATIENT
Start: 2022-03-16 | End: 2022-03-18 | Stop reason: HOSPADM

## 2022-03-16 RX ORDER — NALOXONE HYDROCHLORIDE 0.4 MG/ML
0.2 INJECTION, SOLUTION INTRAMUSCULAR; INTRAVENOUS; SUBCUTANEOUS
Status: DISCONTINUED | OUTPATIENT
Start: 2022-03-16 | End: 2022-03-18 | Stop reason: HOSPADM

## 2022-03-16 RX ORDER — LIDOCAINE 40 MG/G
CREAM TOPICAL
Status: DISCONTINUED | OUTPATIENT
Start: 2022-03-16 | End: 2022-03-16 | Stop reason: HOSPADM

## 2022-03-16 RX ORDER — OXYCODONE HYDROCHLORIDE 5 MG/1
5 TABLET ORAL EVERY 4 HOURS PRN
Qty: 40 TABLET | Refills: 0 | Status: SHIPPED | OUTPATIENT
Start: 2022-03-16 | End: 2022-03-17

## 2022-03-16 RX ORDER — KETOROLAC TROMETHAMINE 15 MG/ML
15 INJECTION, SOLUTION INTRAMUSCULAR; INTRAVENOUS EVERY 6 HOURS
Status: ACTIVE | OUTPATIENT
Start: 2022-03-16 | End: 2022-03-17

## 2022-03-16 RX ORDER — BUPIVACAINE HYDROCHLORIDE 7.5 MG/ML
INJECTION, SOLUTION INTRASPINAL
Status: COMPLETED | OUTPATIENT
Start: 2022-03-16 | End: 2022-03-16

## 2022-03-16 RX ORDER — HYDROMORPHONE HCL IN WATER/PF 6 MG/30 ML
0.2 PATIENT CONTROLLED ANALGESIA SYRINGE INTRAVENOUS
Status: DISCONTINUED | OUTPATIENT
Start: 2022-03-16 | End: 2022-03-18 | Stop reason: HOSPADM

## 2022-03-16 RX ORDER — ACETAMINOPHEN 325 MG/1
650 TABLET ORAL EVERY 4 HOURS
Qty: 100 TABLET | Refills: 0 | Status: SHIPPED | OUTPATIENT
Start: 2022-03-16 | End: 2023-05-18

## 2022-03-16 RX ORDER — NALOXONE HYDROCHLORIDE 0.4 MG/ML
0.4 INJECTION, SOLUTION INTRAMUSCULAR; INTRAVENOUS; SUBCUTANEOUS
Status: DISCONTINUED | OUTPATIENT
Start: 2022-03-16 | End: 2022-03-18 | Stop reason: HOSPADM

## 2022-03-16 RX ORDER — ASPIRIN 81 MG/1
162 TABLET ORAL DAILY
Qty: 60 TABLET | Refills: 0 | Status: SHIPPED | OUTPATIENT
Start: 2022-03-16 | End: 2023-05-18

## 2022-03-16 RX ORDER — SODIUM CHLORIDE, SODIUM LACTATE, POTASSIUM CHLORIDE, CALCIUM CHLORIDE 600; 310; 30; 20 MG/100ML; MG/100ML; MG/100ML; MG/100ML
INJECTION, SOLUTION INTRAVENOUS CONTINUOUS
Status: DISCONTINUED | OUTPATIENT
Start: 2022-03-16 | End: 2022-03-16 | Stop reason: HOSPADM

## 2022-03-16 RX ORDER — CALCIUM CARBONATE 500 MG/1
500 TABLET, CHEWABLE ORAL 4 TIMES DAILY PRN
Status: DISCONTINUED | OUTPATIENT
Start: 2022-03-16 | End: 2022-03-18 | Stop reason: HOSPADM

## 2022-03-16 RX ORDER — HYDROMORPHONE HYDROCHLORIDE 1 MG/ML
0.2 INJECTION, SOLUTION INTRAMUSCULAR; INTRAVENOUS; SUBCUTANEOUS EVERY 5 MIN PRN
Status: DISCONTINUED | OUTPATIENT
Start: 2022-03-16 | End: 2022-03-16 | Stop reason: HOSPADM

## 2022-03-16 RX ORDER — ONDANSETRON 2 MG/ML
INJECTION INTRAMUSCULAR; INTRAVENOUS PRN
Status: DISCONTINUED | OUTPATIENT
Start: 2022-03-16 | End: 2022-03-16

## 2022-03-16 RX ORDER — ONDANSETRON 2 MG/ML
4 INJECTION INTRAMUSCULAR; INTRAVENOUS EVERY 30 MIN PRN
Status: DISCONTINUED | OUTPATIENT
Start: 2022-03-16 | End: 2022-03-16 | Stop reason: HOSPADM

## 2022-03-16 RX ORDER — POLYETHYLENE GLYCOL 3350 17 G/17G
17 POWDER, FOR SOLUTION ORAL DAILY
Status: DISCONTINUED | OUTPATIENT
Start: 2022-03-17 | End: 2022-03-18 | Stop reason: HOSPADM

## 2022-03-16 RX ORDER — ASPIRIN 81 MG/1
81 TABLET ORAL 2 TIMES DAILY
Status: DISCONTINUED | OUTPATIENT
Start: 2022-03-17 | End: 2022-03-18 | Stop reason: HOSPADM

## 2022-03-16 RX ORDER — GABAPENTIN 300 MG/1
300 CAPSULE ORAL
Status: COMPLETED | OUTPATIENT
Start: 2022-03-16 | End: 2022-03-16

## 2022-03-16 RX ORDER — HYDROXYZINE HYDROCHLORIDE 25 MG/1
25 TABLET, FILM COATED ORAL EVERY 6 HOURS PRN
Status: DISCONTINUED | OUTPATIENT
Start: 2022-03-16 | End: 2022-03-18 | Stop reason: HOSPADM

## 2022-03-16 RX ORDER — TRANEXAMIC ACID 650 MG/1
1950 TABLET ORAL ONCE
Status: COMPLETED | OUTPATIENT
Start: 2022-03-16 | End: 2022-03-16

## 2022-03-16 RX ORDER — POLYETHYLENE GLYCOL 3350 17 G/17G
1 POWDER, FOR SOLUTION ORAL DAILY
Qty: 7 PACKET | Refills: 0 | Status: SHIPPED | OUTPATIENT
Start: 2022-03-16 | End: 2023-05-18

## 2022-03-16 RX ORDER — HYDROMORPHONE HCL IN WATER/PF 6 MG/30 ML
0.4 PATIENT CONTROLLED ANALGESIA SYRINGE INTRAVENOUS
Status: DISCONTINUED | OUTPATIENT
Start: 2022-03-16 | End: 2022-03-18 | Stop reason: HOSPADM

## 2022-03-16 RX ORDER — FENTANYL CITRATE 50 UG/ML
INJECTION, SOLUTION INTRAMUSCULAR; INTRAVENOUS PRN
Status: DISCONTINUED | OUTPATIENT
Start: 2022-03-16 | End: 2022-03-16

## 2022-03-16 RX ORDER — PROCHLORPERAZINE MALEATE 10 MG
10 TABLET ORAL EVERY 6 HOURS PRN
Status: DISCONTINUED | OUTPATIENT
Start: 2022-03-16 | End: 2022-03-18 | Stop reason: HOSPADM

## 2022-03-16 RX ORDER — IBUPROFEN 200 MG
800 TABLET ORAL 2 TIMES DAILY PRN
Status: ON HOLD | COMMUNITY
End: 2022-03-17

## 2022-03-16 RX ORDER — AMOXICILLIN 250 MG
1 CAPSULE ORAL 2 TIMES DAILY
Status: DISCONTINUED | OUTPATIENT
Start: 2022-03-16 | End: 2022-03-18 | Stop reason: HOSPADM

## 2022-03-16 RX ORDER — CEFAZOLIN SODIUM/WATER 2 G/20 ML
2 SYRINGE (ML) INTRAVENOUS
Status: COMPLETED | OUTPATIENT
Start: 2022-03-16 | End: 2022-03-16

## 2022-03-16 RX ORDER — FENTANYL CITRATE 50 UG/ML
25 INJECTION, SOLUTION INTRAMUSCULAR; INTRAVENOUS EVERY 5 MIN PRN
Status: DISCONTINUED | OUTPATIENT
Start: 2022-03-16 | End: 2022-03-16 | Stop reason: HOSPADM

## 2022-03-16 RX ORDER — SODIUM CHLORIDE, SODIUM LACTATE, POTASSIUM CHLORIDE, CALCIUM CHLORIDE 600; 310; 30; 20 MG/100ML; MG/100ML; MG/100ML; MG/100ML
INJECTION, SOLUTION INTRAVENOUS CONTINUOUS PRN
Status: DISCONTINUED | OUTPATIENT
Start: 2022-03-16 | End: 2022-03-16

## 2022-03-16 RX ORDER — ACETAMINOPHEN 325 MG/1
975 TABLET ORAL ONCE
Status: DISCONTINUED | OUTPATIENT
Start: 2022-03-16 | End: 2022-03-16

## 2022-03-16 RX ORDER — LIDOCAINE HYDROCHLORIDE 20 MG/ML
INJECTION, SOLUTION INFILTRATION; PERINEURAL PRN
Status: DISCONTINUED | OUTPATIENT
Start: 2022-03-16 | End: 2022-03-16

## 2022-03-16 RX ORDER — OXYCODONE HYDROCHLORIDE 5 MG/1
5 TABLET ORAL EVERY 4 HOURS PRN
Status: DISCONTINUED | OUTPATIENT
Start: 2022-03-16 | End: 2022-03-18 | Stop reason: HOSPADM

## 2022-03-16 RX ORDER — CEFAZOLIN SODIUM 1 G/3ML
1 INJECTION, POWDER, FOR SOLUTION INTRAMUSCULAR; INTRAVENOUS EVERY 8 HOURS
Status: COMPLETED | OUTPATIENT
Start: 2022-03-16 | End: 2022-03-16

## 2022-03-16 RX ORDER — PROPOFOL 10 MG/ML
INJECTION, EMULSION INTRAVENOUS CONTINUOUS PRN
Status: DISCONTINUED | OUTPATIENT
Start: 2022-03-16 | End: 2022-03-16

## 2022-03-16 RX ORDER — ACETAMINOPHEN 325 MG/1
650 TABLET ORAL EVERY 4 HOURS PRN
Status: DISCONTINUED | OUTPATIENT
Start: 2022-03-19 | End: 2022-03-18 | Stop reason: HOSPADM

## 2022-03-16 RX ORDER — CEFAZOLIN SODIUM/WATER 2 G/20 ML
2 SYRINGE (ML) INTRAVENOUS SEE ADMIN INSTRUCTIONS
Status: DISCONTINUED | OUTPATIENT
Start: 2022-03-16 | End: 2022-03-16 | Stop reason: HOSPADM

## 2022-03-16 RX ORDER — AMOXICILLIN 250 MG
1-2 CAPSULE ORAL 2 TIMES DAILY
Qty: 30 TABLET | Refills: 0 | Status: SHIPPED | OUTPATIENT
Start: 2022-03-16 | End: 2023-05-18

## 2022-03-16 RX ORDER — DIPHENHYDRAMINE HCL 25 MG
25 CAPSULE ORAL EVERY 6 HOURS PRN
Status: DISCONTINUED | OUTPATIENT
Start: 2022-03-16 | End: 2022-03-18 | Stop reason: HOSPADM

## 2022-03-16 RX ORDER — FENTANYL CITRATE 50 UG/ML
25 INJECTION, SOLUTION INTRAMUSCULAR; INTRAVENOUS
Status: DISCONTINUED | OUTPATIENT
Start: 2022-03-16 | End: 2022-03-16 | Stop reason: HOSPADM

## 2022-03-16 RX ADMIN — Medication 2 G: at 07:34

## 2022-03-16 RX ADMIN — PHENYLEPHRINE HYDROCHLORIDE 0.3 MCG/KG/MIN: 10 INJECTION INTRAVENOUS at 08:18

## 2022-03-16 RX ADMIN — GABAPENTIN 300 MG: 300 CAPSULE ORAL at 06:11

## 2022-03-16 RX ADMIN — PHENYLEPHRINE HYDROCHLORIDE 100 MCG: 10 INJECTION INTRAVENOUS at 08:15

## 2022-03-16 RX ADMIN — PHENYLEPHRINE HYDROCHLORIDE 100 MCG: 10 INJECTION INTRAVENOUS at 07:59

## 2022-03-16 RX ADMIN — MIDAZOLAM 2 MG: 1 INJECTION INTRAMUSCULAR; INTRAVENOUS at 07:31

## 2022-03-16 RX ADMIN — PHENYLEPHRINE HYDROCHLORIDE 100 MCG: 10 INJECTION INTRAVENOUS at 08:06

## 2022-03-16 RX ADMIN — PHENYLEPHRINE HYDROCHLORIDE 50 MCG: 10 INJECTION INTRAVENOUS at 08:35

## 2022-03-16 RX ADMIN — ACETAMINOPHEN 975 MG: 325 TABLET, FILM COATED ORAL at 15:49

## 2022-03-16 RX ADMIN — SODIUM CHLORIDE, POTASSIUM CHLORIDE, SODIUM LACTATE AND CALCIUM CHLORIDE: 600; 310; 30; 20 INJECTION, SOLUTION INTRAVENOUS at 08:16

## 2022-03-16 RX ADMIN — PHENYLEPHRINE HYDROCHLORIDE 100 MCG: 10 INJECTION INTRAVENOUS at 09:24

## 2022-03-16 RX ADMIN — PHENYLEPHRINE HYDROCHLORIDE 100 MCG: 10 INJECTION INTRAVENOUS at 08:42

## 2022-03-16 RX ADMIN — PHENYLEPHRINE HYDROCHLORIDE 100 MCG: 10 INJECTION INTRAVENOUS at 09:25

## 2022-03-16 RX ADMIN — SODIUM CHLORIDE, POTASSIUM CHLORIDE, SODIUM LACTATE AND CALCIUM CHLORIDE: 600; 310; 30; 20 INJECTION, SOLUTION INTRAVENOUS at 12:28

## 2022-03-16 RX ADMIN — EPHEDRINE SULFATE 5 MG: 50 INJECTION, SOLUTION INTRAVENOUS at 08:42

## 2022-03-16 RX ADMIN — DEXAMETHASONE SODIUM PHOSPHATE 4 MG: 4 INJECTION, SOLUTION INTRAMUSCULAR; INTRAVENOUS at 08:55

## 2022-03-16 RX ADMIN — CEFAZOLIN 1 G: 1 INJECTION, POWDER, FOR SOLUTION INTRAMUSCULAR; INTRAVENOUS at 22:55

## 2022-03-16 RX ADMIN — EPHEDRINE SULFATE 5 MG: 50 INJECTION, SOLUTION INTRAVENOUS at 08:04

## 2022-03-16 RX ADMIN — PHENYLEPHRINE HYDROCHLORIDE 100 MCG: 10 INJECTION INTRAVENOUS at 08:21

## 2022-03-16 RX ADMIN — FENTANYL CITRATE 50 MCG: 50 INJECTION, SOLUTION INTRAMUSCULAR; INTRAVENOUS at 07:37

## 2022-03-16 RX ADMIN — SODIUM CHLORIDE, POTASSIUM CHLORIDE, SODIUM LACTATE AND CALCIUM CHLORIDE: 600; 310; 30; 20 INJECTION, SOLUTION INTRAVENOUS at 07:34

## 2022-03-16 RX ADMIN — PHENYLEPHRINE HYDROCHLORIDE 100 MCG: 10 INJECTION INTRAVENOUS at 08:03

## 2022-03-16 RX ADMIN — TRANEXAMIC ACID 1950 MG: 650 TABLET ORAL at 06:11

## 2022-03-16 RX ADMIN — LIDOCAINE HYDROCHLORIDE 40 MG: 20 INJECTION, SOLUTION INFILTRATION; PERINEURAL at 07:49

## 2022-03-16 RX ADMIN — BUPIVACAINE HYDROCHLORIDE IN DEXTROSE 1.5 ML: 7.5 INJECTION, SOLUTION SUBARACHNOID at 07:40

## 2022-03-16 RX ADMIN — PHENYLEPHRINE HYDROCHLORIDE 100 MCG: 10 INJECTION INTRAVENOUS at 08:52

## 2022-03-16 RX ADMIN — PROPOFOL 150 MCG/KG/MIN: 10 INJECTION, EMULSION INTRAVENOUS at 07:50

## 2022-03-16 RX ADMIN — PHENYLEPHRINE HYDROCHLORIDE 200 MCG: 10 INJECTION INTRAVENOUS at 08:54

## 2022-03-16 RX ADMIN — ACETAMINOPHEN 975 MG: 325 TABLET, FILM COATED ORAL at 22:55

## 2022-03-16 RX ADMIN — PHENYLEPHRINE HYDROCHLORIDE 100 MCG: 10 INJECTION INTRAVENOUS at 09:05

## 2022-03-16 RX ADMIN — SODIUM CHLORIDE, POTASSIUM CHLORIDE, SODIUM LACTATE AND CALCIUM CHLORIDE: 600; 310; 30; 20 INJECTION, SOLUTION INTRAVENOUS at 09:42

## 2022-03-16 RX ADMIN — ACETAMINOPHEN 975 MG: 325 TABLET, FILM COATED ORAL at 06:11

## 2022-03-16 RX ADMIN — EPHEDRINE SULFATE 5 MG: 50 INJECTION, SOLUTION INTRAVENOUS at 08:35

## 2022-03-16 RX ADMIN — EPHEDRINE SULFATE 5 MG: 50 INJECTION, SOLUTION INTRAVENOUS at 08:32

## 2022-03-16 RX ADMIN — CELECOXIB 400 MG: 200 CAPSULE ORAL at 06:11

## 2022-03-16 RX ADMIN — CEFAZOLIN 1 G: 1 INJECTION, POWDER, FOR SOLUTION INTRAMUSCULAR; INTRAVENOUS at 15:49

## 2022-03-16 RX ADMIN — OXYCODONE HYDROCHLORIDE 5 MG: 5 TABLET ORAL at 23:51

## 2022-03-16 RX ADMIN — EPHEDRINE SULFATE 5 MG: 50 INJECTION, SOLUTION INTRAVENOUS at 08:14

## 2022-03-16 RX ADMIN — ONDANSETRON 4 MG: 2 INJECTION INTRAMUSCULAR; INTRAVENOUS at 09:37

## 2022-03-16 RX ADMIN — PHENYLEPHRINE HYDROCHLORIDE 100 MCG: 10 INJECTION INTRAVENOUS at 08:18

## 2022-03-16 NOTE — DISCHARGE SUMMARY
Orthopaedic Surgery Discharge Summary    Name: Lane Bosch  MRN: [id number]  YOB: 1965    Date of Admission: 3/16/2022  Date of Discharge: 3/18/2022  Attending Physician: Dr. Rhoades    Admission Diagnosis:  Left hip pain [M25.552]  Osteoarthritis of left hip [M16.12]    Discharge Diagnosis:  same    Procedures Performed:  L ARLEEN    Brief History of Present Illness:  The patient has a long history of debilitating pain secondary to ostearthritis of the hip.  Despite comprehensive non-operative management these symptoms continued to interfere with activities of daily living.  After discussion of further treatment options including the risks and benefits that patient elected to proceed with a total hip.    Brief Hospital Course:  The patient was admitted to the hospital and underwent the above listed procedure.  Hospital course was notable for orthostatic hypotension while working w/ PT. This self-resolved with increased PO intake. Hgb and vitals remained stable throughout hospitalization.  Patient was seen by physical therapy in hospital, received postoperative antibiotics, and received aspirin for DVT prophylaxis.    On POD#2, patient was tolerating a regular diet, voiding on own, had pain controlled on oral pain medications and was felt to be medically stable for d/c to home.    Exam at time of Discharge: see progress note from date of discharge    DVT prophylaxis: aspirin 81mg BID x 4 weeks    Consults:  IM, PT, OT    Discharge Medications     Review of your medicines      START taking      Dose / Directions   acetaminophen 325 MG tablet  Commonly known as: TYLENOL  Used for: Primary osteoarthritis of left hip      Dose: 650 mg  Take 2 tablets (650 mg) by mouth every 4 hours  Quantity: 100 tablet  Refills: 0     aspirin 81 MG EC tablet  Used for: Primary osteoarthritis of left hip      Dose: 162 mg  Take 2 tablets (162 mg) by mouth daily  Quantity: 60 tablet  Refills: 0     hydrOXYzine 25 MG  tablet  Commonly known as: ATARAX  Used for: Primary osteoarthritis of left hip      Dose: 25 mg  Take 1 tablet (25 mg) by mouth every 6 hours as needed for itching or anxiety (with pain, moderate pain)  Quantity: 30 tablet  Refills: 0     polyethylene glycol 17 g packet  Commonly known as: MIRALAX  Used for: Primary osteoarthritis of left hip      Dose: 1 packet  Take 17 g by mouth daily  Quantity: 7 packet  Refills: 0     senna-docusate 8.6-50 MG tablet  Commonly known as: SENOKOT-S/PERICOLACE  Used for: Primary osteoarthritis of left hip      Dose: 1-2 tablet  Take 1-2 tablets by mouth 2 times daily Take while on oral narcotics to prevent or treat constipation.  Quantity: 30 tablet  Refills: 0     traMADol 50 MG tablet  Commonly known as: ULTRAM  Used for: Primary osteoarthritis of left hip      Dose: 25-50 mg  Take 0.5-1 tablets (25-50 mg) by mouth every 6 hours as needed for severe pain  Quantity: 50 tablet  Refills: 0        CONTINUE these medicines which have NOT CHANGED      Dose / Directions   lisinopril 20 MG tablet  Commonly known as: ZESTRIL  Used for: Benign essential hypertension      Dose: 20 mg  Take 1 tablet (20 mg) by mouth daily  Quantity: 90 tablet  Refills: 3        STOP taking    ibuprofen 200 MG tablet  Commonly known as: ADVIL/MOTRIN              Where to get your medicines      These medications were sent to [place], [place] - [address]  [address] [address]    Phone: [phone]     acetaminophen 325 MG tablet    aspirin 81 MG EC tablet    hydrOXYzine 25 MG tablet    polyethylene glycol 17 g packet    senna-docusate 8.6-50 MG tablet    traMADol 50 MG tablet         Discharge Instructions and Follow-up  Discharge Procedure Orders   Reason for your hospital stay   Order Comments: You stayed in the hospital overnight following your surgery     Contact Surgeon Team   Order Comments: You may experience symptoms that require follow-up  "before your scheduled appointment. Refer to the \"[employer]\" for instructions on when to contact Dr. Rhoades's office if you are concerned about pain control, blood clots, constipation, or if you are unable to urinate.    Regular business hours (Monday - Friday, 8am - 5pm):  [facility]: [phone]  [facility]: [phone]  [facility]: [phone]    After hours and weekends:  [facility] on call Orthopedic resident: [phone]     Orthopedic Urgent Care   Order Comments: If you are not able to reach Dr. Rhoades's office and you need immediate care, go to the Orthopedic Urgent Care at your Surgeon's office.  Do NOT go to the Emergency Room unless you have shortness of breath, chest pain, or other signs of a medical emergency.     Call 911   Order Comments: Call 911 immediately if you experience sudden-onset chest pain, arm weakness/numbness, slurred speech, or shortness of breath     Breathing exercises   Order Comments: Perform breathing exercises using your Incentive Spirometer 10 times per hour while awake for 2 weeks.     Fever Management   Order Comments: A low grade fever can be expected after surgery.  Use acetaminophen (TYLENOL) as needed for fever management.  Contact your Surgeon Team if you have a fever greater than 101.5 F, chills, and/or night sweats.     Constipation management   Order Comments: Constipation (hard, dry bowel movements) is expected after surgery due to the combination of being less active, the anesthetic, and the opioid pain medication.  You can do the following to help reduce constipation:  ~  FLUIDS:  Drink clear liquids (water or Gatorade), or juice (apple/prune).  ~  DIET:  Eat a fiber rich diet.    ~  ACTIVITY:  Get up and move around several times a day.  Increase your activity as you are able.  MEDICATIONS:  Reduce the risk of constipation by starting " medications before you are constipated.  You can take Miralax   (1 packet as directed) and/or a stool softener (Senokot 1-2 tablets 1-2 times a day).  If you already have constipation and these medications are not working, you can get magnesium citrate and use as directed.  If you continue to have constipation you can try an over the counter suppository or enema.  Call your Surgeon Team if it has been greater than 3 days since your last bowel movement.     Reduced Urine Output   Order Comments: Changes in the amount of fluids you drank before and after surgery may result in problems urinating.  It is important to stay well-hydrated after surgery and drink plenty of water. If it has been greater than 8 hours since you have urinated despite drinking plenty of water, call your Surgeon Team.     Anticoagulation - aspirin   Order Comments: Take the aspirin as prescribed for a total of four weeks after surgery.  This is given to help minimize your risk of blood clot.     Activity - Exercises to prevent blood clots   Order Comments: Unless otherwise directed by your Surgeon team, perform the following exercises at least three times per day for the first four weeks after surgery to prevent blood clots in your legs: 1) Point and flex your feet (Ankle Pumps), 2) Move your ankle around in big circles, 3) Wiggle your toes, 4) Walk, even for short distances, several times a day, will help decrease the risk of blood clots.     Order Specific Question Answer Comments   Is discharge order? Yes      Pain after Surgery   Order Comments: Pain after surgery is normal and expected.  You will have some amount of pain for several weeks after surgery.  Your pain will improve with time.  There are several things you can do to help reduce your pain including: rest, ice, elevation, and using pain medications as needed. Contact your Surgeon Team if you have pain that persists or worsens after surgery despite rest, ice, elevation, and taking  "your medication(s) as prescribed. Contact your Surgeon Team if you have new numbness, tingling, or weakness in your operative extremity.     Swelling after Surgery   Order Comments: Swelling and/or bruising of the surgical extremity is common and may persist for several months after surgery. In addition to frequent icing and elevation, gentle compressive support with an ACE wrap or tubigrip may help with swelling. Apply compression regularly, removing at least twice daily to perform skin checks. Contact your Surgeon Team if your swelling increases and is NOT associated with an increase in your activity level, or if your swelling increases and is associated with redness and pain.     LOWER Extremity Elevation   Order Comments: Swelling is expected for several months after surgery. This type of swelling is usually associated with gravity and activity, and can be improved with elevation.   The best way to do this is to get your \"toes above your nose\" by laying down and placing several pillows lengthwise under your calf and heel. When elevating your leg keep your knee completely straight. Perform this elevation as often as possible especially for the first two weeks after surgery.     Cold therapy   Order Comments: Ice can be used to control swelling and discomfort after surgery. Place a thin towel over your operative site and apply the ice pack overtop. Leave ice pack in place for 20 minutes, then remove for 20 minutes. Repeat this 20 minutes on/20 minutes off routine as often as tolerated.     acetaminophen (TYLENOL) Instructions   Order Comments: You were discharged with acetaminophen (TYLENOL) for pain management after surgery. Acetaminophen most effectively manages pain symptoms when it is taken on a schedule without missing doses (every four, six, or eight hours). Your Provider will prescribe a safe daily dose between 3000 - 4000 mg.  Do NOT exceed this daily dose. Most patients use acetaminophen for pain control " for the first four weeks after surgery.  You can wean from this medication as your pain decreases.     NSAID Instructions   Order Comments: You were discharged with an anti-inflammatory medication for pain management to use in combination with acetaminophen (TYLENOL) and the narcotic pain medication.  Take this medication exactly as directed.  You should only take one anti-inflammatory at a time.  Some common anti-inflammatories include: ibuprofen (ADVIL, MOTRIN), naproxen (ALEVE, NAPROSYN), celecoxib (CELEBREX), meloxicam (MOBIC), ketorolac (TORADOL).  Take this medication with food and water.     Opioids - Tapering Instructions   Order Comments: In the first three days following surgery, your symptoms may warrant use of the narcotic pain medication every four to six hours as prescribed. This is normal. As your pain symptoms improve, focus your efforts on decreasing (tapering) use of narcotic medications. The most successful tapering strategy is to first, decrease the number of tablets you take every 4-6 hours to the minimum prescribed. Then, increase the amount of time between doses.  For example:  First, taper to   or 1 tablet every 4-6 hours.  Then, taper to   or 1 tablet every 6-8 hours.  Then, taper to   or 1 tablet every 8-10 hours.  Then, taper to   or 1 tablet every 10-12 hours.  Then, taper to   or 1 tablet at bedtime.  The bedtime dose can help with comfort during sleep and is typically the last dose to be discontinued after surgery.     Follow Up Care   Order Comments: You are scheduled for a post operative wound check with Dr. Rhoades's clinic approximately two weeks after surgery. At approximately six weeks after surgery, you will see Dr. Rhoades in clinic. During this visit, repeat X rays of your operative hip will be performed.      Your follow up appointments will be at the location that you regularly see Dr. Rhoades:    [facility]  [address] [place] [address]  [place] [address]  [phone]    [facility]  [address] [place], [place] [address] [phone]    [facility]  [address] [place] [address]  [phone]     Activity - Total Hip Arthroplasty   Order Comments: Refer to the [facility] \"Your Guide to Total Joint Replacement\" for recommendations on activities and Exercises.     Order Specific Question Answer Comments   Is discharge order? Yes      Return to Driving   Order Comments: Return to driving - Driving is NOT permitted until directed by your provider. Under no circumstance are you permitted to drive while using narcotic pain medications.     Order Specific Question Answer Comments   Is discharge order? Yes      Dressing / Wound Care - Wound   Order Comments: You have a clean dressing on your surgical wound. Dressing change instructions as follows: dressing will be removed at your follow-up appointment. Contact your Surgeon Team if you have increased redness, warmth around the surgical wound, and/or drainage from the surgical wound.     Dressing / Wound Care - NO Tub Bathing   Order Comments: Tub bathing, swimming, or any other activities that will cause your incision to be submerged in water should be avoided until allowed by your Surgeon.     Opioid Instructions (Less than 65 years)   Order Comments: You were discharged with an opioid medication (hydromorphone, oxycodone, hydrocodone, or tramadol). This medication should only be taken for breakthrough pain that is not controlled with acetaminophen (TYLENOL). If you rate your pain less than 3 you do not need this medication.  Pain rating 0-3:  You do not need this medication.  Pain rating 4-6:  Take 1 tablet every 4-6 hours as needed  Pain rating 7-10:  Take 2 tablets every 4-6 hours as needed.  Do not exceed 6 tablets per day     No flexion past 90 degrees   Order Comments: No bending at waist past 90 degrees. "     Order Specific Question Answer Comments   Is discharge order? Yes      No internal rotation   Order Comments: No pivoting on your operative leg.     Order Specific Question Answer Comments   Is discharge order? Yes      No adduction past midline   Order Comments: No crossing your operative leg.     Order Specific Question Answer Comments   Is discharge order? Yes      Weight bearing as tolerated   Order Comments: Weight bearing as tolerated on your operative extremity.     Order Specific Question Answer Comments   Is discharge order? Yes      Shower with wound/dressing NOT covered   Order Comments: You do not need to cover your dressing or incision in the shower, you may allow water and soap to run over top of the surgical dressing or incision. You may shower 3 days after surgery.  You are strictly prohibited from soaking or submerging the surgical wound underwater.     Crutches DME   Order Comments: DME Documentation: Describe the reason for need to support medical necessity: Impaired gait status post hip surgery. I, the undersigned, certify that the above prescribed supplies are medically necessary for this patient and is both reasonable and necessary in reference to accepted standards of medical practice in the treatment of this patient's condition and is not prescribed as a convenience.     Order Specific Question Answer Comments   DME Provider: [facility]    Crutch Type: Standard    Crutches Add On: NA    Length of Need: Lifetime      Cane DME   Order Comments: DME Documentation: Describe the reason for need to support medical necessity: Impaired gait status post hip surgery. I, the undersigned, certify that the above prescribed supplies are medically necessary for this patient and is both reasonable and necessary in reference to accepted standards of medical practice in the treatment of this patient's condition and is not prescribed as a convenience.     Order Specific Question Answer Comments   DME  Provider: [facility]    Cane Type: Single Tip    Reminder: Patient can typically get 1 every 5 years      Walker DME   Order Comments: : DME Documentation: Describe the reason for need to support medical necessity: Impaired gait status post hip surgery. I, the undersigned, certify that the above prescribed supplies are medically necessary for this patient and is both reasonable and necessary in reference to accepted standards of medical practice in the treatment of this patient's condition and is not prescribed as a convenience.     Order Specific Question Answer Comments   DME Provider: [facility]    Walker Type: Standard (2 Wheel)    Accessories: N/A      Regular Diet Adult     Order Specific Question Answer Comments   Is discharge order? Yes        Discharge Disposition: home    Kwasi Perea MD  Orthopaedic Surgery, PGY-4  Pager: [phone]

## 2022-03-16 NOTE — ANESTHESIA CARE TRANSFER NOTE
Patient: Lane Bosch    Procedure: Procedure(s):  ARTHROPLASTY, HIP, TOTAL LEFT       Diagnosis: Left hip pain [M25.552]  Diagnosis Additional Information: No value filed.    Anesthesia Type:   Spinal     Note:    Oropharynx: spontaneously breathing  Level of Consciousness: awake  Oxygen Supplementation: face mask  Level of Supplemental Oxygen (L/min / FiO2): 8  Independent Airway: airway patency satisfactory and stable  Dentition: dentition unchanged  Vital Signs Stable: post-procedure vital signs reviewed and stable  Report to RN Given: handoff report given  Patient transferred to: PACU    Handoff Report: Identifed the Patient, Identified the Reponsible Provider, Reviewed the pertinent medical history, Discussed the surgical course, Reviewed Intra-OP anesthesia mangement and issues during anesthesia, Set expectations for post-procedure period and Allowed opportunity for questions and acknowledgement of understanding      Vitals:  Vitals Value Taken Time   /67    Temp 37.1    Pulse 69    Resp 12    SpO2 100        Electronically Signed By: MARCELA Jefferson CRNA  March 16, 2022  10:10 AM

## 2022-03-16 NOTE — PLAN OF CARE
"Goal Outcome Evaluation:      VS: /59 (BP Location: Right arm, Patient Position: Semi-Still's)   Pulse 56   Temp (!) 96.5  F (35.8  C) (Oral)   Resp 11   Ht 1.745 m (5' 8.7\")   Wt 74.8 kg (164 lb 14.5 oz)   SpO2 96%   BMI 24.56 kg/m     O2: Stable on RA. On CAPNO.   Output: Straight cath in PACU at 1123  for 875cc.   Last BM: 3/15 per pt report.   Activity: Due to get out of bed. Spinal wearing off.   Skin: Intact. Ex. Surgical incision to the left hip.   Pain: Scheduled tylenol and PO oxycodone is available as needed.   CMS: Intact. Spinal wearing off.   Dressing: CDI.   Diet: Regular diet.    LDA: PIV x2. R-hand infusing.   Equipment: Iv pole, capno, call light with in reach.   Plan: TBD.   Additional Info: Pt AxO4 , able to make needs known.      Patient vital signs are at baseline: Yes  Patient able to ambulate as they were prior to admission or with assist devices provided by therapies during their stay:  pt has not gotten out of bed.  Patient MUST void prior to discharge:  No,  Reason:  pt straight cath in Pacu at 1123 for 875cc.  Patient able to tolerate oral intake:  Yes  Pain has adequate pain control using Oral analgesics:  Yes  Does patient have an identified :  Yes  Has goal D/C date and time been discussed with patient:  No,  Reason: PT and Ot has to clear pt first.    "

## 2022-03-16 NOTE — OR NURSING
PACU to Inpatient Nursing Handoff    Patient Lane Bosch is a 56 year old male who speaks English.   Procedure Procedure(s):  ARTHROPLASTY, HIP, TOTAL LEFT   Surgeon(s) Primary: Miguel Angel Rhoades MD  Resident - Assisting: Kwasi Perea MD     No Known Allergies    Isolation  None    Past Medical History   has a past medical history of Hypertension and Osteoarthritis of left hip (3/16/2022).    Anesthesia spinal   Dermatome Level     Preop Meds acetaminophen (Tylenol) - time given: 0611  celecoxib (Celebrex) - time given: 400  gabapentin (Neurontin) - time given: 300   Nerve block Not applicable   Intraop Meds dexamethasone (Decadron)  fentanyl (Sublimaze): 50 mcg total  ondansetron (Zofran): last given at 0937   Local Meds Yes - Local Cocktail (morphine, ropivacaine, epinephrine, Toradol)   Antibiotics cefazolin (Ancef) - last given at 0734     Pain Patient Currently in Pain: denies   PACU meds  Not applicable   PCA / epidural No   Capnography Respiratory Monitoring (EtCO2): 37 mmHg  Integrated Pulmonary Index (IPI): 8-9   Telemetry ECG Rhythm: Sinus rhythm   Inpatient Telemetry Monitor Ordered? No        Labs Glucose Lab Results   Component Value Date    GLC 85 03/16/2022    GLC 94 02/22/2022    GLC 95 09/08/2020       Hgb No results found for: HGB    INR No results found for: INR   PACU Imaging Completed     Wound/Incision Incision/Surgical Site 03/16/22 Left Hip (Active)   Incision Assessment UTV 03/16/22 1008   Closure ROBIN 03/16/22 1008   Dressing Intervention Clean, dry, intact 03/16/22 1008   Number of days: 0      CMS     Intact, spinal coming down, see flowsheet   Equipment ice pack   Other LDA       IV Access Peripheral IV 03/16/22 Right;Dorsal;Posterior Hand (Active)   Site Assessment WDL 03/16/22 1008   Line Status Infusing 03/16/22 1008   Phlebitis Scale 0-->no symptoms 03/16/22 1008   Number of days: 0       Peripheral IV 03/16/22 Left Hand (Active)   Site Assessment WDL 03/16/22 1008   Line  Status Saline locked 03/16/22 1008   Phlebitis Scale 0-->no symptoms 03/16/22 1008   Number of days: 0      Blood Products Not applicable  mL   Intake/Output Date 03/16/22 0700 - 03/17/22 0659   Shift 1881-9723 4863-8022 4433-3740 24 Hour Total   INTAKE   I.V. 1700   1700   Shift Total(mL/kg) 1700(22.73)   1700(22.73)   OUTPUT   Blood 200   200   Shift Total(mL/kg) 200(2.67)   200(2.67)   Weight (kg) 74.8 74.8 74.8 74.8      Drains / Gandhi     Time of void PreOp Void Prior to Procedure: 0515 (03/16/22 0602)    PostOp  straight cathed for 875cc at 1120    Diapered? No   Bladder Scan  done   PO   120cc water crackers and water     Vitals    B/P: 128/65  T: 98.8  F (37.1  C)    Temp src: Oral  P:  Pulse: 60 (03/16/22 1030)          R: 12  O2:  SpO2: 99 %    O2 Device: None (Room air) (03/16/22 1030)    Oxygen Delivery: 6 LPM (03/16/22 1015)         Family/support present significant other   Patient belongings     Patient transported on bed   DC meds/scripts (obs/outpt) Not applicable   Inpatient Pain Meds Released? Yes       Special needs/considerations None   Tasks needing completion None       Nish Rey  ASCDIOGENES [id number]

## 2022-03-16 NOTE — BRIEF OP NOTE
Brief Operative Note  March 16, 2022  Lane Bosch  1965  [id number]    Pre Op Diagnosis: L hip OA  Post Op Diagnosis: Same    Procedure Performed: L ARLEEN    Surgeon: Dr. Rhoades  Assistant: Kwasi Perea, PGY4, Antonia Brown PA-C    Anesthesia: spinal  EBL: 200ml  Drains: none  Specimen: none  Findings: Please see detailed Op Note  Complications: None  Implants: See operative report      Post-Op Assessment and Plan    56 year old M w/ PMH HTN now s/p L ARLEEN w/ Dr. Rhoades on March 16, 2022.    Ortho Primary  Activity: Up with assist until independent  Weight bearing status: WBAT LLE  Bracing/Splinting: none  Imaging: L hip XRs PACU   Antibiotics: Ancef x 24 hours  DVT prophylaxis:  SCDs in house; aspirin 81mg BID x 4 weeks  Drains: none  Dressings: alginate and tegaderm  Diet: ADAT  Pain management: Transition from IV to PO as tolerated.  Labs: Hgb POD1, 2, 3  Physical Therapy/Occupational Therapy: mobilize, ADLs  Consults: Hospitalist, PT/OT, SW, appreciate assistance  Follow-up: 2 weeks as scheduled  Disposition: Pending progress with therapies, pain control on orals, and medical stability, anticipate discharge to home on POD 0-1.    Kwasi Perea MD  Orthopaedic Surgery, PGY4  Pager: [phone]

## 2022-03-16 NOTE — ANESTHESIA PROCEDURE NOTES
Intrathecal injection Procedure Note    Pre-Procedure   Staff -        Anesthesiologist:  Shashank Chavira DO       Performed By: anesthesiologist       Location: OR       Procedure Start/Stop Times: 3/16/2022 7:38 AM and 3/16/2022 7:40 AM       Pre-Anesthestic Checklist: patient identified, IV checked, risks and benefits discussed, informed consent, monitors and equipment checked, pre-op evaluation, at physician/surgeon's request and post-op pain management  Timeout:       Correct Patient: Yes        Correct Procedure: Yes        Correct Site: Yes        Correct Position: Yes   Procedure Documentation  Procedure: intrathecal injection       Diagnosis: ARLEEN       Patient Position: sitting       Skin prep: Chloraprep       Insertion Site: L3-4. (midline approach).       Needle Gauge: 25.        Needle Length (Inches): 3.5        Spinal Needle Type: Pencan       Introducer used       Introducer: 20 G       # of attempts: 1 and  # of redirects:  0    Assessment/Narrative         Paresthesias: No.       CSF fluid: clear.    Medication(s) Administered   0.75% Hyperbaric Bupivacaine (Intrathecal), 1.5 mL  Medication Administration Time: 3/16/2022 7:40 AM     Comments:  Informed consent obtained.  All risks and benefits of the epidural/spinal discussed with the patient.  All questions answered and all parties agreed with the plan.

## 2022-03-16 NOTE — OP NOTE
OPERATIVE REPORT    DATE OF SERVICE: 3/16/22    SURGEON: Miguel Angel Rhoades MD.    ASSISTANT(S):  Kwasi Perea MD    PREOPERATIVE DIAGNOSIS:  Osteoarthritis    POSTOPERATIVE DIAGNOSIS:  Osteoarthritis    OPERATION PERFORMED:  Left total hip arthroplasty    IMPLANTS:    Implant Name Type Inv. Item Serial No.  Lot No. LRB No. Used Action   IMP LINER SNR ACET R3 XLPE 0DEG [id number] - [id number] Total Joint Component/Insert IMP LINER SNR ACET R3 XLPE 0DEG [id number]  Junior  [id number] Left 1 Implanted   IMP SHELL SNR ACET R3 3H 54MM [id number] - [id number] Total Joint Component/Insert IMP SHELL SNR ACET R3 3H 54MM [id number]  [employer] [id number] Left 1 Implanted   IMP SCR ACET SNN SPHERICAL HEAD 6.5X20MM [id number] - [id number] Metallic Hardware/[place] IMP SCR ACET SNN SPHERICAL HEAD 6.5X20MM [id number]  [employer] [id number] Left 1 Implanted   IMP SCR ACET SNN SPHERICAL HEAD 6.5X40MM [id number] - [id number] Metallic Hardware/[place] IMP SCR ACET SNN SPHERICAL HEAD 6.5X40MM [id number]  [employer] [id number] Left 1 Implanted   IMP STEM FEM HIP SNN SYNERGY POROUS SZ 16 HO [id number] - [id number] Total Joint Component/Insert IMP STEM FEM HIP SNN SYNERGY POROUS SZ 16 HO [id number]  [employer] [id number] Left 1 Implanted   IMP HEAD FEMORAL SNN BIPOLAR [place] 36MM +4 [id number] - [id number] Total Joint Component/Insert IMP HEAD FEMORAL SNN BIPOLAR [place] 36MM +4 [id number]  [employer] [id number] Left 1 Implanted         ANESTHETIC: spinal     OPERATIVE FINDINGS:  End stage arthrosis of the hip    BLOOD LOSS:200 ml     COMPLICATIONS:  None apparent    OPERATIVE INDICATIONS:  The patient has a long history of debilitating pain secondary to ostearthritis of the hip.  Despite comprehensive non-operative management these symptoms continued to interfere with activities of daily living.  After discussion of further treatment options including the risks and benefits that patient elected  to proceed with a total hip.    DESCRIPTION OF THE PROCEDURE:  The patient was identified in the preoperative holding area.  The consent form including the risks and benefits were reviewed with the patient.  The operative limb was identified and marked.  The patient was brought back to the operating room and placed supine on the operating table.  An anesthetic was induced by the anesthesia team.   The patient was placed in the lateral decubitus position and prepped and draped in the normal standard fashion for a hip replacement.  A time-out was called.  Antibiotics were given.  We utilized an approximately 15 cm curvilinear incision, centered on the vastus ridge, and performed a standard posterior approach to the hip.  The tensor fascia was split.  A small portion of gluteus corrie was split in line with its fibers.  The sciatic nerve was palpated.  The east-west retractor was placed.  The posterior border of gluteus medius was exposed and retracted.  The tendon of piriformis and that of the obturators was released from their attachments.  A trapdoor posterior capsulotomy was performed.  The hip was dislocated.  The lesser trochanter was exposed.  A ruler was used to measure and electrocautery was used to elaine our neck cut as preoperatively templated.  The head was measured with a caliper and found to be 49 mm.  This measurement was used to choose our first reamer.  The neck cut was re-measured. The femur was elevated.  A Hohmann was placed over the anterior rim of the acetabulum and the femur was subluxed anterior.  A split was made in the inferior capsule.  The transverse acetabular ligament was left intact and used a guide for the anterversion of the acetabular component.  Circumferential retractors were placed.  We began reaming and went up by two until sufficient contact was made with the acetabular rim.  We then went up by one millimeter for a one millimeter press-fit.  We were within one size of our  "preoperative plan.   A trail was placed.  It had an excellent press fit.  We then placed out final component in 40 degrees of inclination and approximately 20 degrees of anteversion, parallel to the transverse ligament.  The press fit was excellent.  Screws were placed for additional initial fixation.  A flat liner was then placed. It locked into place.  Attention was turned to the femur.  Retractors were placed to elevated the proximal femur and to protect the tendon of gluteus medius.  Remaining lateral neck was removed and the piriformis fossa was cleared of soft-tissue.  A box osteotome and canal finder were used to prepare for broaching.  A sharp broach was used to lateralize slightly.  We then broached up sequentially to a size 16.  It was rotationally stable and sat up 1-2 millimeters from the neck-cut.  Preoperatively the patient had templated to a high offset stem.  The high offset stem appropriately tensioned the abductors.  We trialed the following femoral heads: 0 and +4.  The +4  appropriately tensioned the abductors and clinically equalized the leg-lengths.  The stability exam was excellent.  The hip was stable and there was no impingement posteriorly with hyper-extension and maximal external rotation.  With full extension, the knee could be fixed to bring the foot nearly to the buttock.  With the hip in ninety degrees of flexion and neutral rotation there was greater than 60 degrees of internal rotation before subluxation.  There appropriate movement with a \"rose\" test.  Happy with our stability exam, the final implant was placed in approximately twenty degrees of anteversion.  It sat within 2 mm of the broach.  We then trailed with a +4 head.  The stability exam was identical.  We then placed the final head on a clean, dry neck and impacted it into place. The hip was reduced after directly visualizing the entire acetabulum.  The wound was then irrigated.  The posterior capsule and short external " rotators were sutured to the greater trochanter with non-absorbable suture through bone tunnels.The fascia was closed with interrupted Vicryl, the dermis with interrupted Vicryl, and skin with running monocryl, Dermabond and steri-strips.  At the end of the procedure the sponge and needle counts were correct times two.  The patient tolerated the procedure well and returned to the PAR extubated and stable.    POSTOPERATIVE PLAN:  1. Weight bearing as tolerated  2. Standard posterior hip precautions  3. DVT prophylaxis   4. 24 hours of prophylactic antibiotics  5. Follow-up:  Wound clinic in 2 weeks and with Jf in clinic in 6 weeks for x-rays and a rehabilitation check.

## 2022-03-16 NOTE — PROGRESS NOTES
Ortho Post-op Check    Subjective:  Seen on floor at bedside.  Pain controlled. Tolerating food and fluids. Out of bed w/ therapy. Doing very well, eager to DC home in AM.      Objective:   General:  NAD  Respiratory:  NLB  Musculoskeletal:  LLE:  Dressings c/d/i  +EHL/FHL/GSC/TA  SILT s/s/sp/dp/t nerves  +DP/PT pulses  Toes wwp    Assessment/Plan:   56 year old male POD 0 s/p L ARLEEN.  Doing well.    - Continue plan per brief op note.    Kwasi Perea MD  Orthopaedic Surgery, PGY-4  Pager: [phone]

## 2022-03-16 NOTE — ANESTHESIA POSTPROCEDURE EVALUATION
Patient: Lane Bosch    Procedure: Procedure(s):  ARTHROPLASTY, HIP, TOTAL LEFT       Anesthesia Type:  Spinal    Note:  Disposition: Outpatient   Postop Pain Control: Uneventful            Sign Out: Well controlled pain   PONV: No   Neuro/Psych: Uneventful            Sign Out: Acceptable/Baseline neuro status   Airway/Respiratory: Uneventful            Sign Out: Acceptable/Baseline resp. status   CV/Hemodynamics: Uneventful            Sign Out: Acceptable CV status   Other NRE: NONE   DID A NON-ROUTINE EVENT OCCUR? No           Last vitals:  Vitals Value Taken Time   /74 03/16/22 1130   Temp 36.5  C (97.7  F) 03/16/22 1115   Pulse 53 03/16/22 1133   Resp 16 03/16/22 1133   SpO2 99 % 03/16/22 1150   Vitals shown include unvalidated device data.    Electronically Signed By: Shashank Chavira DO  March 16, 2022  2:15 PM

## 2022-03-16 NOTE — ANESTHESIA PREPROCEDURE EVALUATION
Anesthesia Pre-Procedure Evaluation    Patient: Lane Bosch   MRN: [id number] : 1965        Procedure : Procedure(s):  ARTHROPLASTY, HIP, TOTAL LEFT          Past Medical History:   Diagnosis Date     Hypertension       Past Surgical History:   Procedure Laterality Date     COLONOSCOPY N/A 10/5/2020    Procedure: COLONOSCOPY, WITH POLYPECTOMY AND BIOPSY;  Surgeon: Jackie Ruiz MD;  Location: [facility]     HEMILAMINECTOMY, DISCECTOMY LUMBAR ONE LEVEL, COMBINED  2012     L4-5     ZZC CYSTOTOMY,REPAIR URETEROCELE        No Known Allergies   Social History     Tobacco Use     Smoking status: Never Smoker     Smokeless tobacco: Never Used   Substance Use Topics     Alcohol use: No      Wt Readings from Last 1 Encounters:   22 74.8 kg (164 lb 14.5 oz)        Anesthesia Evaluation   Pt has had prior anesthetic.     No history of anesthetic complications       ROS/MED HX  ENT/Pulmonary:  - neg pulmonary ROS     Neurologic:  - neg neurologic ROS     Cardiovascular:     (+) hypertension-----    METS/Exercise Tolerance: >4 METS    Hematologic:  - neg hematologic  ROS     Musculoskeletal:  - neg musculoskeletal ROS     GI/Hepatic:  - neg GI/hepatic ROS     Renal/Genitourinary:  - neg Renal ROS     Endo:  - neg endo ROS     Psychiatric/Substance Use:  - neg psychiatric ROS     Infectious Disease:  - neg infectious disease ROS     Malignancy:  - neg malignancy ROS     Other:  - neg other ROS          Physical Exam    Airway  airway exam normal      Mallampati: I   TM distance: > 3 FB   Neck ROM: full   Mouth opening: > 3 cm    Respiratory Devices and Support         Dental  no notable dental history         Cardiovascular   cardiovascular exam normal       Rhythm and rate: regular and normal     Pulmonary   pulmonary exam normal        breath sounds clear to auscultation           OUTSIDE LABS:  CBC: No results found for: WBC, HGB, HCT, PLT  BMP:   Lab Results   Component Value Date     2022      09/08/2020    POTASSIUM 3.9 02/22/2022    POTASSIUM 3.9 09/08/2020    CHLORIDE 109 02/22/2022    CHLORIDE 104 09/08/2020    CO2 30 02/22/2022    CO2 30 09/08/2020    BUN 20 02/22/2022    BUN 11 09/08/2020    CR 0.80 02/22/2022    CR 0.75 09/08/2020    GLC 85 03/16/2022    GLC 94 02/22/2022     COAGS: No results found for: PTT, INR, FIBR  POC: No results found for: BGM, HCG, HCGS  HEPATIC:   Lab Results   Component Value Date    ALBUMIN 4.1 11/17/2017    PROTTOTAL 7.5 11/17/2017    ALT 35 11/17/2017    AST 27 11/17/2017    ALKPHOS 75 11/17/2017    BILITOTAL 0.6 11/17/2017     OTHER:   Lab Results   Component Value Date    ROHITH 9.0 02/22/2022       Anesthesia Plan    ASA Status:  1   NPO Status:  NPO Appropriate    Anesthesia Type: Spinal.   Induction: Propofol.   Maintenance: N/A.        Consents    Anesthesia Plan(s) and associated risks, benefits, and realistic alternatives discussed. Questions answered and patient/representative(s) expressed understanding.    - Discussed:     - Discussed with:  Patient    Use of blood products discussed: No .     Postoperative Care    Pain management: Multi-modal analgesia, Oral pain medications.   PONV prophylaxis: Ondansetron (or other 5HT-3), Dexamethasone or Solumedrol     Comments:                Shashank Chavira DO

## 2022-03-16 NOTE — PHARMACY-VANCOMYCIN DOSING SERVICE
Admission Medication History Completed by Pharmacy    See [facility] Admission Navigator for allergy information, preferred outpatient pharmacy, prior to admission medications and immunization status.     Medication History Sources:     Patient    Medication dispense history    Changes made to PTA medication list (reason):    Added: Ibuprofen    Deleted: None    Changed: None     Additional Information:    None    Prior to Admission medications    Medication Sig Last Dose Taking? Auth Provider   ibuprofen (ADVIL/MOTRIN) 200 MG tablet Take 800 mg by mouth 2 times daily as needed for mild pain Past Month at Unknown time Yes Unknown, Entered By History   lisinopril (ZESTRIL) 20 MG tablet Take 1 tablet (20 mg) by mouth daily 3/15/2022 at 0600 Yes Franc Mckeon MD       Date completed: 03/16/22    Medication history completed by: Mariangel Matthews RPH

## 2022-03-16 NOTE — PLAN OF CARE
[facility]      OUTPATIENT PHYSICAL THERAPY EVALUATION  PLAN OF TREATMENT FOR OUTPATIENT REHABILITATION  (COMPLETE FOR INITIAL CLAIMS ONLY)  Patient's Last Name, First Name, M.I.  YOB: 1965  Lane Bosch                        Provider's Name  [facility] Medical Record No.  [id number]                               Onset Date:  03/16/22   Start of Care Date:  03/16/22      Type:     _X_PT   ___OT   ___SLP Medical Diagnosis:  s/p left ARLEEN                        PT Diagnosis:  Decreased strength, decreased ROM, and impaired functional mobility.    Visits from SOC:  1   _________________________________________________________________________________  Plan of Treatment/Functional Goals    Planned Interventions: bed mobility training, stair training, gait training, transfer training     Goals: See Physical Therapy Goals on Care Plan in [employer] electronic health record.    Therapy Frequency: 2x/day  Predicted Duration of Therapy Intervention: 03/17/22  _________________________________________________________________________________    I CERTIFY THE NEED FOR THESE SERVICES FURNISHED UNDER        THIS PLAN OF TREATMENT AND WHILE UNDER MY CARE     (Physician co-signature of this document indicates review and certification of the therapy plan).              Certification date from: 03/16/22, Certification date to: 03/17/22    Referring Physician: Miguel Angel Rhoades MD            Initial Assessment        See Physical Therapy evaluation dated 03/16/22 in Epic electronic health record.

## 2022-03-16 NOTE — PROGRESS NOTES
"Orthopedic Surgery Progress Note    Assessment and Plan:   56 year old M w/ PMH HTN now s/p L ARLEEN w/ Dr. Rhoades on March 16, 2022.     Ortho Primary  Activity: Up with assist until independent  Weight bearing status: WBAT LLE  Bracing/Splinting: none  Imaging: L hip XRs PACU   Antibiotics: Ancef x 24 hours  DVT prophylaxis:  SCDs in house; aspirin 81mg BID x 4 weeks  Drains: none  Dressings: alginate and tegaderm  Diet: ADAT  Pain management: Transition from IV to PO as tolerated.  Labs: Hgb POD1, 2, 3  Physical Therapy/Occupational Therapy: mobilize, ADLs  Consults: Hospitalist, PT/OT, SW, appreciate assistance  Follow-up: 2 weeks as scheduled  Disposition: Pending progress with therapies, pain control on orals, and medical stability, anticipate discharge to home on POD 0-1.    Future Appointments   Date Time Provider Department Center   3/17/2022  8:00 AM Antonia Rodriguez Pt, PT [facility] [place]   3/17/2022  9:30 AM Monique Tyson OT [facility] [place]   3/17/2022  2:30 PM Ronen Roth, PTA [facility] [place]   3/23/2022  8:30 AM Domenic Leblanc, PT [facility]   3/31/2022 10:30 AM [facility]   4/28/2022 10:30 AM Miguel nAgel Rhoades MD On license of [facility]       Kwasi Perea MD  Orthopaedic Surgery, PGY-4  Pager: [phone]    =========================================================    Subjective:   NAEO. Pain controlled. Tolerating diet. Voiding spontaneously. Has up and down hallway w/ therapy, though was dizzy this morning and had to come back to room in wheelchair. Normotensive, hemoglobin 11.0.    Exam:  BP (!) 142/70 (BP Location: Left arm)   Pulse 65   Temp (!) 96.3  F (35.7  C) (Oral)   Resp 16   Ht 1.745 m (5' 8.7\")   Wt 74.8 kg (164 lb 14.5 oz)   SpO2 98%   BMI 24.56 kg/m    Gen: Awake, alert, NAD  Resp: non-labored breathing  CV: Extr wwp  LLE:  Dressings c/d/i  +EHL/FHL/GSC/TA  SILT s/s/sp/dp/t nerves  +DP/PT pulses    Labs:  Recent Labs   Lab Test 03/16/22  0549 " 02/22/22  0933 09/08/20  0932 11/16/18  1411   CR  --  0.80 0.75 0.77   GLC 85 94 95 76       Imaging:  Completed in PACU

## 2022-03-17 ENCOUNTER — APPOINTMENT (OUTPATIENT)
Dept: OCCUPATIONAL THERAPY | Facility: CLINIC | Age: 57
End: 2022-03-17
Attending: ORTHOPAEDIC SURGERY
Payer: COMMERCIAL

## 2022-03-17 ENCOUNTER — APPOINTMENT (OUTPATIENT)
Dept: PHYSICAL THERAPY | Facility: CLINIC | Age: 57
End: 2022-03-17
Attending: ORTHOPAEDIC SURGERY
Payer: COMMERCIAL

## 2022-03-17 LAB
FASTING STATUS PATIENT QL REPORTED: NO
GLUCOSE BLD-MCNC: 130 MG/DL (ref 70–99)
HGB BLD-MCNC: 11 G/DL (ref 13.3–17.7)

## 2022-03-17 PROCEDURE — 99203 OFFICE O/P NEW LOW 30 MIN: CPT | Performed by: INTERNAL MEDICINE

## 2022-03-17 PROCEDURE — 85018 HEMOGLOBIN: CPT | Performed by: STUDENT IN AN ORGANIZED HEALTH CARE EDUCATION/TRAINING PROGRAM

## 2022-03-17 PROCEDURE — 250N000013 HC RX MED GY IP 250 OP 250 PS 637: Performed by: STUDENT IN AN ORGANIZED HEALTH CARE EDUCATION/TRAINING PROGRAM

## 2022-03-17 PROCEDURE — 97165 OT EVAL LOW COMPLEX 30 MIN: CPT | Mod: GO

## 2022-03-17 PROCEDURE — 99207 PR CDG-CODE CATEGORY CHANGED: CPT | Performed by: INTERNAL MEDICINE

## 2022-03-17 PROCEDURE — 97116 GAIT TRAINING THERAPY: CPT | Mod: GP | Performed by: PHYSICAL THERAPIST

## 2022-03-17 PROCEDURE — 97530 THERAPEUTIC ACTIVITIES: CPT | Mod: GO

## 2022-03-17 PROCEDURE — 97530 THERAPEUTIC ACTIVITIES: CPT | Mod: GP | Performed by: PHYSICAL THERAPIST

## 2022-03-17 PROCEDURE — 82947 ASSAY GLUCOSE BLOOD QUANT: CPT | Performed by: ORTHOPAEDIC SURGERY

## 2022-03-17 PROCEDURE — 97535 SELF CARE MNGMENT TRAINING: CPT | Mod: GO

## 2022-03-17 PROCEDURE — 36415 COLL VENOUS BLD VENIPUNCTURE: CPT | Performed by: ORTHOPAEDIC SURGERY

## 2022-03-17 RX ORDER — TRAMADOL HYDROCHLORIDE 50 MG/1
25-50 TABLET ORAL EVERY 6 HOURS PRN
Qty: 50 TABLET | Refills: 0 | Status: SHIPPED | OUTPATIENT
Start: 2022-03-17 | End: 2023-05-18

## 2022-03-17 RX ADMIN — ACETAMINOPHEN 975 MG: 325 TABLET, FILM COATED ORAL at 14:37

## 2022-03-17 RX ADMIN — OXYCODONE HYDROCHLORIDE 5 MG: 5 TABLET ORAL at 08:05

## 2022-03-17 RX ADMIN — ASPIRIN 81 MG: 81 TABLET, COATED ORAL at 20:40

## 2022-03-17 RX ADMIN — ASPIRIN 81 MG: 81 TABLET, COATED ORAL at 07:58

## 2022-03-17 RX ADMIN — OXYCODONE HYDROCHLORIDE 5 MG: 5 TABLET ORAL at 14:40

## 2022-03-17 RX ADMIN — OXYCODONE HYDROCHLORIDE 5 MG: 5 TABLET ORAL at 11:59

## 2022-03-17 RX ADMIN — ACETAMINOPHEN 975 MG: 325 TABLET, FILM COATED ORAL at 06:38

## 2022-03-17 RX ADMIN — ACETAMINOPHEN 975 MG: 325 TABLET, FILM COATED ORAL at 22:46

## 2022-03-17 RX ADMIN — OXYCODONE HYDROCHLORIDE 5 MG: 5 TABLET ORAL at 20:40

## 2022-03-17 RX ADMIN — SENNOSIDES AND DOCUSATE SODIUM 1 TABLET: 50; 8.6 TABLET ORAL at 20:40

## 2022-03-17 RX ADMIN — SENNOSIDES AND DOCUSATE SODIUM 1 TABLET: 50; 8.6 TABLET ORAL at 07:58

## 2022-03-17 NOTE — PLAN OF CARE
"Goal Outcome Evaluation:      VS: BP (!) 142/70 (BP Location: Left arm)   Pulse 65   Temp (!) 96.3  F (35.7  C) (Oral)   Resp 16   Ht 1.745 m (5' 8.7\")   Wt 74.8 kg (164 lb 14.5 oz)   SpO2 98%   BMI 24.56 kg/m     O2: Stable on RA.    Output: Void spontaneously in the toilet.   Last BM: 3/15 per pt report.   Activity: SBA with walker and gait belt.   Skin: Intact. Ex. Surgical incision to the left hip.   Pain: Scheduled tylenol and PO oxycodone is available as needed.   CMS: Intact.    Dressing: CDI.   Diet: Regular diet.    LDA: PIV x2. SL   Equipment: Iv pole, capno, call light with in reach.   Plan: Home with family assist.   Additional Info: Pt AxO4 , able to make needs known.       Patient vital signs are at baseline: Yes  Patient able to ambulate as they were prior to admission or with assist devices provided by therapies during their stay:  pt has not gotten out of bed.  Patient MUST void prior to discharge:  No,  Reason:  pt straight cath in Pacu at 1123 for 875cc.  Patient able to tolerate oral intake:  Yes  Pain has adequate pain control using Oral analgesics:  Yes  Does patient have an identified :  Yes  Has goal D/C date and time been discussed with patient:  No,  Reason: PT and Ot has to clear pt first.     "

## 2022-03-17 NOTE — PROGRESS NOTES
"  VS: /72 (BP Location: Left arm)   Pulse 63   Temp 97.5  F (36.4  C) (Oral)   Resp 16   Ht 1.745 m (5' 8.7\")   Wt 74.8 kg (164 lb 14.5 oz)   SpO2 97%   BMI 24.56 kg/m       O2: Room air   Output: Voids spontaneously   Last BM: 3/16/22   Activity: Not OOB this shift   Skin: L hip incision   Pain: Managed with tylenol and one dose of prn oxycodone   CMS: Intact   Dressing: CDI   Diet: Regular   LDA: PIV to R hand SL   Equipment: IV Pole, Gait belt, Walker and Personal belongings   Plan: Continue on current plan of care   Additional Info: Patient required straight cath x1 in the PACU, however voiding well overnight         "

## 2022-03-17 NOTE — PLAN OF CARE
"VS: /70 (BP Location: Left arm, Patient Position: Supine)   Pulse 57   Temp 97.5  F (36.4  C) (Oral)   Resp 14   Ht 1.745 m (5' 8.7\")   Wt 74.8 kg (164 lb 14.5 oz)   SpO2 98%   BMI 24.56 kg/m      O2: Sating >90% on RA. Lung sounds clear. Denies chest pain and SOB.   Output: Voids spontaneously and adequately.    Last BM: 3/16/22 per pt report. Bowel sounds active x4. Passing flatus.    Activity: Pt not OOB this shift.    Up for meals? Yes.    Skin: L hip incision.    Pain: Pain was managed with scheduled tylenol and ice.    CMS: A&Ox4. Denies N/T.    Dressing: Dressing to L hip C/D/I. PIV dressing C/D/I.   Diet: Regular. Appetite was good. Denies N/V.    LDA: PIV to R hand is infusing at 75mL/hr.    Equipment: IV pole, FWW, gait belt, and personal belongings.    Plan: Continue to monitor. Call light within reach and utilized appropriately.    Additional Info:       Pt Obs Goals:  ~ Patient vital signs are at baseline: met    ~ Patient able to ambulate as they were prior to admission or with assist devices provided by therapies during their stay: not met: pt was not OOB this shift.    ~ Patient MUST void prior to discharge: met   ~ Patient able to tolerate oral intake to maintain hydration status: met  ~ Patient has adequate pain control using Oral analgesics: met   ~ Hypercapnia, hypoventilation or hypoxia resolved for at least 2 hours without supplemental oxygen: met  ~ Deficits in sensation, mobility or coordination have resolved if spinal or regional anesthesia was used: met  ~ Patient has returned to baseline mental status: met    "

## 2022-03-17 NOTE — PROGRESS NOTES
"Orthopedic Surgery Progress Note    Assessment and Plan:   56 year old M w/ PMH HTN now s/p L ARLEEN w/ Dr. Rhoades on March 16, 2022.    Goal for 3/18: Pain control on orals, pass PT, discharge home.     Ortho Primary  Activity: Up with assist until independent  Weight bearing status: WBAT LLE  Bracing/Splinting: none  Imaging: L hip XRs PACU   Antibiotics: Ancef x 24 hours  DVT prophylaxis:  SCDs in house; aspirin 81mg BID x 4 weeks  Drains: none  Dressings: alginate and tegaderm  Diet: ADAT  Pain management: Transition from IV to PO as tolerated.  Labs: Hgb POD1, 2, 3  Physical Therapy/Occupational Therapy: mobilize, ADLs  Consults: Hospitalist, PT/OT, SW, appreciate assistance  Follow-up: 2 weeks as scheduled  Disposition: Pending progress with therapies, pain control on orals, and medical stability, anticipate discharge to home on POD 0-1.    Future Appointments   Date Time Provider Department Center   3/17/2022  8:00 AM Antonia Rodriguez Pt, PT [facility] [place]   3/17/2022  9:30 AM Monique Tyson OT [facility] [place]   3/17/2022  2:30 PM Ronen Roth, PTA [facility] [place]   3/23/2022  8:30 AM Domenic Leblanc, PT [facility]   3/31/2022 10:30 AM Miguel Angel Rhoades MD [facility]   4/28/2022 10:30 AM Miguel Angel Rhoades MD [facility]       Kwasi Perea MD  Orthopaedic Surgery, PGY-4  Pager: [phone]    =========================================================    Subjective:   Episodes of orthostatic hypotension twice with PT yesterday therefore did not clear for home. Seen by IM colleagues, appreciate assistance. No other issues.    Exam:  /71 (BP Location: Left arm, Patient Position: Semi-Still's)   Pulse 60   Temp 97.7  F (36.5  C) (Oral)   Resp 18   Ht 1.745 m (5' 8.7\")   Wt 74.8 kg (164 lb 14.5 oz)   SpO2 98%   BMI 24.56 kg/m    Gen: Awake, alert, NAD  Resp: non-labored breathing  CV: Extr wwp  LLE:  Dressings c/d/i  +EHL/FHL/GSC/TA  SILT s/s/sp/dp/t nerves  +DP/PT " pulses    Labs:  Recent Labs   Lab Test 03/16/22  0549 02/22/22  0933 09/08/20  0932 11/16/18  1411   CR  --  0.80 0.75 0.77   GLC 85 94 95 76       Imaging:  Completed in PACU

## 2022-03-17 NOTE — CONSULTS
[facility]  Consult Note - Hospitalist Service  Date of Admission:  3/16/2022  Consult Requested by: Ortho   Reason for Consult: Medical co-management     Assessment & Plan   Lane Bosch is a 56 year old male admitted on 3/16/2022. POD # 1 s/p left total hip arthroplasty.  ml. No apparent complications after surgery.  Internal medicine was consulted for medical co-management and evaluation of orthostatic hypotension after surgery.   He has a PMHx significant for hip osteoarthritis and HTN.   Today, he is having persistent episodes of symptomatic orthostatic hypotension not able to tolerate PT session. BP is stable at rest. Receiving IVF's and he is euvolemic.  Patient denies any previous episodes of orthostatic hypotension.       S/P left total hip arthroplasty.  Ortho is primary team.   Continue monitoring HGB.   PT / OT as tolerated.   Pain is well controlled. Minimize narcotics as much as possible.    Continue with bowel regimen.     Orthostatic hypotension   HTN  -Hold PTA Lisinopril for now. Resting BP is okay.    -He is euvolemic, encouraged pt to increase PO fluid intake.    -Orthostatic hypotension could be related to anesthesia from recent surgery and narcotics.   -Try abdominal binder and compression stocking. If he persists with orthostatic hypotension tomorrow AM consider midodrine.   -Continue monitoring HGB.   -Continue monitoring Orthostatic vital signs every shift.          The patient's care was discussed with the Patient.    Talon Garcia MD  [facility]  Securely message with the Vocera Web Console (learn more here)  Text page via [facility] Paging/[employer]       Hospitalist Service    ______________________________________________________________________    Chief Complaint   Hip pain     History is obtained from the patient    History of Present Illness   Lane Bosch is a 56 year old male  admitted on 3/16/2022. POD # 1 s/p left total hip arthroplasty.  ml. No apparent complications after surgery.  Internal medicine was consulted for medical co-management and evaluation of orthostatic hypotension after surgery.   He has a PMHx significant for hip osteoarthritis and HTN.   Today, he is having persistent episodes of symptomatic orthostatic hypotension not able to tolerate PT session. BP is stable at rest. Receiving IVF's and he is euvolemic.  Patient denies any previous episodes of orthostatic hypotension.   Patient denies chest pain, palpitations, nausea, vomit, abdominal pain, shortness of breath, cough, fever or chills.     Review of Systems   The 10 point Review of Systems is negative other than noted in the HPI or here.     Past Medical History    I have reviewed this patient's medical history and updated it with pertinent information if needed.   Past Medical History:   Diagnosis Date     Hypertension      Osteoarthritis of left hip 3/16/2022       Past Surgical History   I have reviewed this patient's surgical history and updated it with pertinent information if needed.  Past Surgical History:   Procedure Laterality Date     ARTHROPLASTY HIP Left 3/16/2022    Procedure: Left total hip arthroplasty;  Surgeon: Miguel Angel Rhoades MD;  Location: [facility]     COLONOSCOPY N/A 10/5/2020    Procedure: COLONOSCOPY, WITH POLYPECTOMY AND BIOPSY;  Surgeon: Jackie Ruiz MD;  Location: [facility]     HEMILAMINECTOMY, DISCECTOMY LUMBAR ONE LEVEL, COMBINED  12/13/2012     L4-5     ZZC CYSTOTOMY,REPAIR URETEROCELE  2015       Social History   I have reviewed this patient's social history and updated it with pertinent information if needed.  Social History     Tobacco Use     Smoking status: Never Smoker     Smokeless tobacco: Never Used   Vaping Use     Vaping Use: Never used   Substance Use Topics     Alcohol use: No     Drug use: No       Family History   I have reviewed this patient's family history and  updated it with pertinent information if needed.  Family History   Problem Relation Age of Onset     Prostate Cancer Father      Other Cancer Father         pancreatic     Prostate Cancer Paternal Grandfather      Other - See Comments Son         cleft palete       Medications   Current Facility-Administered Medications   Medication     [START ON 3/19/2022] acetaminophen (TYLENOL) tablet 650 mg     acetaminophen (TYLENOL) tablet 975 mg     aspirin EC tablet 81 mg     benzocaine-menthol (CEPACOL) 15-3.6 MG lozenge 1 lozenge     bisacodyl (DULCOLAX) Suppository 10 mg     calcium carbonate (TUMS) chewable tablet 500 mg     diphenhydrAMINE (BENADRYL) capsule 25 mg     HYDROmorphone (DILAUDID) injection 0.2 mg    Or     HYDROmorphone (DILAUDID) injection 0.4 mg     hydrOXYzine (ATARAX) tablet 25 mg     lactated ringers infusion     lidocaine (LMX4) cream     lidocaine 1 % 0.1-1 mL     [Held by provider] lisinopril (ZESTRIL) tablet 20 mg     magnesium hydroxide (MILK OF MAGNESIA) suspension 30 mL     naloxone (NARCAN) injection 0.2 mg    Or     naloxone (NARCAN) injection 0.4 mg    Or     naloxone (NARCAN) injection 0.2 mg    Or     naloxone (NARCAN) injection 0.4 mg     ondansetron (ZOFRAN-ODT) ODT tab 4 mg    Or     ondansetron (ZOFRAN) injection 4 mg     oxyCODONE (ROXICODONE) tablet 5 mg    Or     oxyCODONE (ROXICODONE) tablet 10 mg     polyethylene glycol (MIRALAX) Packet 17 g     prochlorperazine (COMPAZINE) injection 10 mg    Or     prochlorperazine (COMPAZINE) tablet 10 mg     senna-docusate (SENOKOT-S/PERICOLACE) 8.6-50 MG per tablet 1 tablet     sodium chloride (PF) 0.9% PF flush 3 mL     sodium chloride (PF) 0.9% PF flush 3 mL       Allergies   No Known Allergies    Physical Exam   Vital Signs: Temp: 98.4  F (36.9  C) Temp src: Oral BP: 136/65 Pulse: 60   Resp: 16 SpO2: 100 % O2 Device: None (Room air)    Weight: 164 lbs 14.47 oz    General Appearance: Alert, room air, no acute distress, laying on bed.   Eyes:  Pupils equal and reactive to light.   HEENT: Oral mucosa moist.   Respiratory: Normal respiratory effort, clear lungs, no crackles or wheezing.   Cardiovascular: RRR, no murmur.   GI: Abdomen is soft, + BS, no tenderness to palpation.   Lymph/Hematologic: No lymphadenopathy.   Genitourinary: Deferred.   Skin: No rash.   Musculoskeletal: No peripheral edema.   Neurologic: Alert, oriented x 3, moving all extremities.   Psychiatric: Mood stable.     Data   Results for orders placed or performed during the hospital encounter of 03/16/22 (from the past 24 hour(s))   Hemoglobin   Result Value Ref Range    Hemoglobin 11.0 (L) 13.3 - 17.7 g/dL   Glucose   Result Value Ref Range    Glucose 130 (H) 70 - 99 mg/dL    Patient Fasting > 8hrs? No

## 2022-03-17 NOTE — PROGRESS NOTES
"   03/17/22 4813   Quick Adds   Quick Adds Certification   Type of Visit Initial Occupational Therapy Evaluation   Living Environment   People in Home spouse   Current Living Arrangements house   Home Accessibility stairs to enter home;stairs within home   Number of Stairs, Main Entrance 2   Stair Railings, Main Entrance railings safe and in good condition   Number of Stairs, Within Home, Primary greater than 10 stairs   Stair Railings, Within Home, Primary railing on left side (ascending)   Transportation Anticipated family or friend will provide   Living Environment Comments multi level home but can have needs met on main level, walk-in shower, standard height    Self-Care   Usual Activity Tolerance good   Current Activity Tolerance fair   Regular Exercise Yes   Activity/Exercise Type biking;strength training;swimming   Exercise Amount/Frequency daily   Equipment Currently Used at Home none   Fall history within last six months no   Activity/Exercise/Self-Care Comment Ind in ADLs at baseline    Instrumental Activities of Daily Living (IADL)   IADL Comments Ind in IADL at baseline   General Information   Onset of Illness/Injury or Date of Surgery 03/16/22   Referring Physician Dr. Rhoades   Patient/Family Therapy Goal Statement (OT) Return home to baseline   Additional Occupational Profile Info/Pertinent History of Current Problem Per chart \"56 year old M w/ PMH HTN now s/p L ARLEEN w/ Dr. Rhoades on March 16, 2022.\"   Existing Precautions/Restrictions no hip IR;no hip ADD past midline;90 degree hip flexion;no pivoting or twisting   Left Lower Extremity (Weight-bearing Status) weight-bearing as tolerated (WBAT)   Cognitive Status Examination   Affect/Mental Status (Cognitive) WNL   Follows Commands WNL   Sensory   Sensory Quick Adds No deficits were identified   Pain Assessment   Patient Currently in Pain Yes, see Vital Sign flowsheet  (4)   Range of Motion Comprehensive   General Range of Motion bilateral upper " extremity ROM WNL   Strength Comprehensive (MMT)   Comment, General Manual Muscle Testing (MMT) Assessment B UE's WFL    Bed Mobility   Bed Mobility supine-sit   Supine-Sit [place] (Bed Mobility) supervision;verbal cues   Assistive Device (Bed Mobility) leg    Transfers   Transfers sit-stand transfer   Sit-Stand Transfer   Sit-Stand [place] (Transfers) contact guard   Assistive Device (Sit-Stand Transfers) walker, front-wheeled   Activities of Daily Living   BADL Assessment/Intervention upper body dressing;lower body dressing;toileting   Upper Body Dressing Assessment/Training   [place] Level (Upper Body Dressing) independent   Lower Body Dressing Assessment/Training   [place] Level (Lower Body Dressing) supervision;verbal cues   Assistive Devices (Lower Body Dressing) sock-aid;reacher;long-handled shoe horn;leg    Position (Lower Body Dressing) edge of bed sitting   Toileting   [place] Level (Toileting) unable to assess  (due to dizziness and feeling unwell )   Clinical Impression   Criteria for Skilled Therapeutic Interventions Met (OT) Yes, treatment indicated   OT Diagnosis Impaired independence in ADLs   OT Problem List-Impairments impacting ADL activity tolerance impaired;post-surgical precautions;pain   Assessment of Occupational Performance 1-3 Performance Deficits   Identified Performance Deficits dressing, bathing, toileting   Planned Therapy Interventions (OT) ADL retraining   Clinical Decision Making Complexity (OT) low complexity   Anticipated Equipment Needs Upon Discharge (OT) hip kit;commode chair;shower chair   Risk & Benefits of therapy have been explained evaluation/treatment results reviewed;care plan/treatment goals reviewed;patient;spouse/significant other   Clinical Impression Comments Pt demonstrates decreased independence in ADLs due to pain, post-surgical precautions, and orthostatic hypotension and would benefit from therapy to improve strength and  independence in dressing, toileting, and bathing.    OT Discharge Planning   OT Discharge Recommendation (DC Rec) home with assist   OT Rationale for DC Rec Pt currently is limited by orthostatic hypotension for mobility but anticipate will be able to complete ADLs mod I once BP controlled.    OT Brief overview of current status mod I dressing w/ AE after instruction, SBA/min A bed mobility w/ leg , CGA sit>stand FWW   Therapy Certification   Start of Care Date 03/17/22   Certification date from 03/17/22   Certification date to 03/18/22   Medical Diagnosis s/p L ARLEEN   Total Evaluation Time (Minutes)   Total Evaluation Time (Minutes) 8   OT Goals   Therapy Frequency (OT) Daily   OT Predicated Duration/Target Date for Goal Attainment 03/19/22   OT Goals Lower Body Dressing;Toilet Transfer/Toileting;OT Goal 1   OT: Lower Body Dressing Modified independent;within precautions;using adaptive equipment;Goal Met   OT: Toilet Transfer/Toileting Modified independent;within precautions;using adaptive equipment   OT: Goal 1 Pt will verbally demonstrate understanding of hip precautions. Goal met.

## 2022-03-18 ENCOUNTER — APPOINTMENT (OUTPATIENT)
Dept: OCCUPATIONAL THERAPY | Facility: CLINIC | Age: 57
End: 2022-03-18
Attending: ORTHOPAEDIC SURGERY
Payer: COMMERCIAL

## 2022-03-18 ENCOUNTER — APPOINTMENT (OUTPATIENT)
Dept: PHYSICAL THERAPY | Facility: CLINIC | Age: 57
End: 2022-03-18
Attending: ORTHOPAEDIC SURGERY
Payer: COMMERCIAL

## 2022-03-18 VITALS
TEMPERATURE: 96.8 F | HEIGHT: 69 IN | OXYGEN SATURATION: 98 % | HEART RATE: 55 BPM | DIASTOLIC BLOOD PRESSURE: 69 MMHG | RESPIRATION RATE: 16 BRPM | BODY MASS INDEX: 24.42 KG/M2 | WEIGHT: 164.9 LBS | SYSTOLIC BLOOD PRESSURE: 154 MMHG

## 2022-03-18 LAB
GLUCOSE BLDC GLUCOMTR-MCNC: 82 MG/DL (ref 70–99)
HGB BLD-MCNC: 10.8 G/DL (ref 13.3–17.7)

## 2022-03-18 PROCEDURE — 36415 COLL VENOUS BLD VENIPUNCTURE: CPT | Performed by: STUDENT IN AN ORGANIZED HEALTH CARE EDUCATION/TRAINING PROGRAM

## 2022-03-18 PROCEDURE — 97530 THERAPEUTIC ACTIVITIES: CPT | Mod: GP | Performed by: PHYSICAL THERAPIST

## 2022-03-18 PROCEDURE — 85018 HEMOGLOBIN: CPT | Performed by: STUDENT IN AN ORGANIZED HEALTH CARE EDUCATION/TRAINING PROGRAM

## 2022-03-18 PROCEDURE — 97535 SELF CARE MNGMENT TRAINING: CPT | Mod: GO

## 2022-03-18 PROCEDURE — 97116 GAIT TRAINING THERAPY: CPT | Mod: GP | Performed by: PHYSICAL THERAPIST

## 2022-03-18 PROCEDURE — 250N000013 HC RX MED GY IP 250 OP 250 PS 637: Performed by: STUDENT IN AN ORGANIZED HEALTH CARE EDUCATION/TRAINING PROGRAM

## 2022-03-18 PROCEDURE — 82962 GLUCOSE BLOOD TEST: CPT

## 2022-03-18 RX ADMIN — SENNOSIDES AND DOCUSATE SODIUM 1 TABLET: 50; 8.6 TABLET ORAL at 08:09

## 2022-03-18 RX ADMIN — OXYCODONE HYDROCHLORIDE 5 MG: 5 TABLET ORAL at 08:56

## 2022-03-18 RX ADMIN — OXYCODONE HYDROCHLORIDE 5 MG: 5 TABLET ORAL at 01:49

## 2022-03-18 RX ADMIN — ACETAMINOPHEN 975 MG: 325 TABLET, FILM COATED ORAL at 08:09

## 2022-03-18 RX ADMIN — ASPIRIN 81 MG: 81 TABLET, COATED ORAL at 08:09

## 2022-03-18 NOTE — PLAN OF CARE
Occupational Therapy Discharge Summary    Reason for therapy discharge:    All goals and outcomes met, no further needs identified.    Progress towards therapy goal(s). See goals on Care Plan in [facility] electronic health record for goal details.  Goals met    Therapy recommendation(s):    No further therapy is recommended. Home with assist from wife and use of DME to facilitate IND with ADLs within precautions.

## 2022-03-18 NOTE — PLAN OF CARE
"  VS: /71 (BP Location: Left arm, Patient Position: Semi-Still's)   Pulse 60   Temp 97.7  F (36.5  C) (Oral)   Resp 18   Ht 1.745 m (5' 8.7\")   Wt 74.8 kg (164 lb 14.5 oz)   SpO2 98%   BMI 24.56 kg/m        O2: O2 sats > 90% RA , denies SBO or distress   Output: Voids without difficulty, uses urinal   Last BM: 3/15/22    Activity: A1 with transfers , uses walker   Skin: Dry and intact, dressing to Left hip, clean dry and intact   Pain: Pain mged with prn oxy  and ice pack   CMS: Denies numbness and tingling to R. hip   Dressing: Dressing to right hip is clean and intaxt   Diet: regular   LDA: PIV R hand SL   Equipment: IV pole, walker, pcds   Plan: Continue with current care plan   Additional Info:                          "

## 2022-03-18 NOTE — PLAN OF CARE
"Goal Outcome Evaluation: ready for discharge home    Plan of Care Reviewed With: patient, spouse        VS: Blood pressure (!) 154/69, pulse 55, temperature 96.8  F (36  C), temperature source Oral, resp. rate 16, height 1.745 m (5' 8.7\"), weight 74.8 kg (164 lb 14.5 oz), SpO2 98 %.  BP meds on hold d/t ortho hypo this hospitalization.   Denies SOB, CP, N/T   O2: RA. LS CTA. IS to 4000   Output: Voids w/o difficulty   Last BM: 3/16, Fl+ BS+. Took senna   Activity: Up with A1, WW, GB. Denies dizziness/lightheadedness   Skin: Intact ex incision   Pain: L hip pain managed with ice, tylenol, oxy   CMS: Intact, denies N/T   Dressing: CDI   Diet: Regular, tolerating   LDA: PIV removed   Equipment: Ant RUIZ   Plan: Discharge to home today   Additional Info:                "

## 2022-03-18 NOTE — PLAN OF CARE
DISCHARGE SUMMARY    Pt discharging to: home  Transportation: wheelchair to wife   AVS given and discussed: yes  Stoplight Tool given and discussed: yes  Medications given: yes  Belongings returned: yes  Comments: tolerated well

## 2022-03-18 NOTE — PLAN OF CARE
Physical Therapy Discharge Summary    Reason for therapy discharge:    All goals and outcomes met, no further needs identified.    Progress towards therapy goal(s). See goals on Care Plan in [facility] electronic health record for goal details.  Goals met    Therapy recommendation(s):    Continued therapy is recommended.  Rationale/Recommendations:  OP PT.  Continue home exercise program.

## 2022-03-18 NOTE — PLAN OF CARE
[id number]  Pt A&O x's 4. VSS. Afebrile. 02 sats in the 90s on RA. Lungs clear. Denies SOB, CP or  nausea. Tolerating regular diet. Bowel sound active in all quadrants. No BM but passing gas. Voiding adequate amount into the urinal. Pain Brody  managed with current pain medication. CMS intact, denies N/T. PIV patent and SL. Pt  is able to make needs known, call light with in reach. Will continue to monitor.

## 2022-03-23 ENCOUNTER — HOSPITAL ENCOUNTER (OUTPATIENT)
Dept: PHYSICAL THERAPY | Facility: CLINIC | Age: 57
Setting detail: THERAPIES SERIES
Discharge: HOME | End: 2022-03-23
Attending: ORTHOPAEDIC SURGERY
Payer: COMMERCIAL

## 2022-03-23 PROCEDURE — 97110 THERAPEUTIC EXERCISES: CPT | Mod: GP | Performed by: PHYSICAL THERAPIST

## 2022-03-23 PROCEDURE — 97140 MANUAL THERAPY 1/> REGIONS: CPT | Mod: GP | Performed by: PHYSICAL THERAPIST

## 2022-03-23 PROCEDURE — 97161 PT EVAL LOW COMPLEX 20 MIN: CPT | Mod: GP | Performed by: PHYSICAL THERAPIST

## 2022-03-23 NOTE — PROGRESS NOTES
"  Lane \"Kem\" Hiro Physical Therapy Hip Evaluation   03/23/22 0800   General Information   Type of Visit Initial OP Ortho PT Evaluation   Start of Care Date 03/23/22   Referring Physician Miguel Angel Rhoades MD   Patient/Family Goals Statement To be able to return to previous level of function to inclide riding bike, hiking, rock climbing, [place] and downhill skiing   Orders Evaluate and Treat   Date of Order 11/08/21   Certification Required? No   Medical Diagnosis Left Hip Pain    Precautions/Limitations left hip precautions  (posterior approach)   Weight-Bearing Status - LLE weight-bearing as tolerated   General Information Comments DOS 3/16/22       Present No   Body Part(s)   Body Part(s) Hip   Presentation and Etiology   Pertinent history of current problem (include personal factors and/or comorbidities that impact the POC) Pt comes to therapy today s/p left ARLEEN performed on 3/16/22. Pt reports he has bene able to get around ok thus far with the walker. Some ongoing dificulty sleeping but improving.  Has been able to go up and down stairs safely to date. Reorts ongoing swelling into foot and leg.  Pain levels well controlled with use of ice and tramadol.  Has been workingn on some light exercises at home. Patient able to recite all post operative precautions    Impairments A. Pain;C. Swelling;B. Decreased WB tolerance;D. Decreased ROM;E. Decreased flexibility;F. Decreased strength and endurance;H. Impaired gait   Functional Limitations perform activities of daily living;perform desired leisure / sports activities   Symptom Location left hip    How/Where did it occur Other  (post operative )   Onset date of current episode/exacerbation 03/16/22   Chronicity New   Pain rating (0-10 point scale) Best (/10);Worst (/10)   Best (/10) 1   Worst (/10) 5   Pain quality C. Aching   Frequency of pain/symptoms B. Intermittent   Pain/symptoms are: Worse during the day   Pain/symptoms " exacerbated by G. Certain positions   Pain/symptoms eased by H. Cold;E. Changing positions   Progression of symptoms since onset: Improved   Prior Level of Function   Prior Level of Function-Mobility independent   Prior Level of Function-ADLs independent   Current Level of Function   Current Community Support Family/friend caregiver   Patient role/employment history H. Other   [facility]   Current equipment-Gait/Locomotion Front wheeled walker   Current equipment-ADL Long shoe horn;Sock aide;Reacher;Raised Toilet Seat   Fall Risk Screen   Fall screen completed by PT   Have you fallen 2 or more times in the past year? No   Have you fallen and had an injury in the past year? No   Is patient a fall risk? No   Abuse Screen (yes response referral indicated)   Feels Unsafe at Home or Work/School no   Feels Threatened by Someone no   Does Anyone Try to Keep You From Having Contact with Others or Doing Things Outside Your Home? no   Physical Signs of Abuse Present no   System Outcome Measures   Outcome Measures   (LEFS 13/80)   Hip Objective Findings   Side (if bilateral, select both right and left) Left   Observation pt in no acute distress, oriented x4   Integumentary  surgical incision dressed without any post operative drainage or exudate and displays good periincisional tissue mobility    Posture forward leaning to ofload on walker   Gait/Locomotion antalgic with limited hip extension on LLE and decreased time in stance phase on LLE   Palpation mild tenderness joby-incisional   Left Hip Flexion PROM 70   Left Hip Abduction PROM 10   Left Hip Adduction PROM 0   Left Hip ER PROM 15   Left Hip IR PROM 0   Left Hip Ext PROM -10   Left Hip Flexion Strength not tested due to post operative nature   Left Hip Abduction Strength not tested due to post operative nature   Left Hip Extension Strength not tested due to post operative nature   Left Hip IR Strength not tested due to post operative nature   Left  Hip ER Strength not tested due to post operative nature   Left Knee Flexion Strength 4+/5   Left Knee Extension Strength 4+/5   Left Hamstring Flexibility significantly impaired   Left Piriformis Flexibility significantly impaired   Left Prone Quad Flexibility significantly impaired   Planned Therapy Interventions   Planned Therapy Interventions ADL retraining;balance training;gait training;joint mobilization;manual therapy;neuromuscular re-education;ROM;strengthening;stretching   Planned Modality Interventions   Planned Modality Interventions Cryotherapy;Electrical stimulation;Hot packs;TENS;Ultrasound   Planned Modality Interventions Comments PRN   Clinical Impression   Criteria for Skilled Therapeutic Interventions Met yes, treatment indicated   PT Diagnosis Left hip pain    Influenced by the following impairments impairments seen in lefft hip A/PROM, strength, balance/proprioception, and pain as well as weight bearing and gait disturbances    Functional limitations due to impairments difficulty with prolonged walking, prolonged standing, prolonged positioning, sleeping, lifting and carrying tasks    Clinical Presentation Stable/Uncomplicated   Clinical Presentation Rationale younger, healthy male, minimal co-morbidities, active prior to sx   Clinical Decision Making (Complexity) Low complexity   Therapy Frequency 2 times/Week   Predicted Duration of Therapy Intervention (days/wks) 8-10 weeks, decreasing as appropriate    Risk & Benefits of therapy have been explained Yes   Patient, Family & other staff in agreement with plan of care Yes   Education Assessment   Preferred Learning Style Listening;Demonstration;Pictures/video   Barriers to Learning No barriers   ORTHO GOALS   PT Ortho Eval Goals 2;1;3;4;5   Ortho Goal 1   Goal Identifier STG1   Goal Description Pt will be independent with HEP to ensure gradual return to previous level of function    Target Date 04/13/22   Ortho Goal 2   Goal Identifier LTG1   Goal  Description Pt will be able to put on shoes/socks with minimal difficulty and pain    Target Date 05/04/22   Ortho Goal 3   Goal Identifier LTG2   Goal Description Pt will improve LEFS score by >25 points to display significant improvement in functional activity tolerance    Target Date 05/18/22   Ortho Goal 4   Goal Identifier LTG3   Goal Description Pt will return to all ADL/IADL/leisure activities with hip pain no greater than 1/10   Target Date 05/18/22   Ortho Goal 5   Goal Identifier LTG4   Goal Description Pt will display ability to ascend/descend stairs x 10 with reciprocal gait pattern and no compensatory movement patterns,    Target Date 05/18/22   Total Evaluation Time   PT Eval, Low Complexity Minutes (39898) 15

## 2022-03-30 ENCOUNTER — HOSPITAL ENCOUNTER (OUTPATIENT)
Dept: PHYSICAL THERAPY | Facility: CLINIC | Age: 57
Setting detail: THERAPIES SERIES
Discharge: HOME | End: 2022-03-30
Attending: ORTHOPAEDIC SURGERY
Payer: COMMERCIAL

## 2022-03-30 PROCEDURE — 97110 THERAPEUTIC EXERCISES: CPT | Mod: GP | Performed by: PHYSICAL THERAPIST

## 2022-03-30 PROCEDURE — 97140 MANUAL THERAPY 1/> REGIONS: CPT | Mod: GP | Performed by: PHYSICAL THERAPIST

## 2022-03-31 ENCOUNTER — OFFICE VISIT (OUTPATIENT)
Dept: ORTHOPEDICS | Facility: CLINIC | Age: 57
End: 2022-03-31
Payer: COMMERCIAL

## 2022-03-31 DIAGNOSIS — Z47.89 ORTHOPEDIC AFTERCARE: Primary | ICD-10-CM

## 2022-03-31 PROCEDURE — 99024 POSTOP FOLLOW-UP VISIT: CPT | Performed by: ORTHOPAEDIC SURGERY

## 2022-03-31 RX ORDER — POLYETHYLENE GLYCOL 3350 17 G/17G
POWDER, FOR SOLUTION ORAL
COMMUNITY
Start: 2022-03-16 | End: 2023-05-18

## 2022-03-31 ASSESSMENT — HOOS JR
LYING IN BED (TURNING OVER, MAINTAINING HIP POSITION): SEVERE
SITTING: MILD
GOING UP OR DOWN STAIRS: MODERATE
HOOS JR TOTAL INTERVAL SCORE: 67.52
RISING FROM SITTING: MILD

## 2022-03-31 NOTE — LETTER
3/31/2022         RE: Lane Bosch  [address] [id number]        Dear Colleague,    Thank you for referring your patient, Lane Bosch, to the [facility] [place]. Please see a copy of my visit note below.    Chief Complaint: Surgical Followup (2 weeks s/p Left ARLEEN DOS 3/16/22)       HPI: Lane Bosch returns today in follow-up for his left hip. He presents today with a cane. He is using 50 mg of tramadol 1-2 times a day. Working with a physical therapist and riding a stationary bike for an hour at a time at home.     Medications and allergies are documented in the EMR and have been reviewed.    Current Outpatient Medications:      acetaminophen (TYLENOL) 325 MG tablet, Take 2 tablets (650 mg) by mouth every 4 hours, Disp: 100 tablet, Rfl: 0     aspirin 81 MG EC tablet, Take 2 tablets (162 mg) by mouth daily, Disp: 60 tablet, Rfl: 0     GAVILAX 17 GM/SCOOP powder, , Disp: , Rfl:      hydrOXYzine (ATARAX) 25 MG tablet, Take 1 tablet (25 mg) by mouth every 6 hours as needed for itching or anxiety (with pain, moderate pain), Disp: 30 tablet, Rfl: 0     lisinopril (ZESTRIL) 20 MG tablet, Take 1 tablet (20 mg) by mouth daily, Disp: 90 tablet, Rfl: 3     polyethylene glycol (MIRALAX) 17 g packet, Take 17 g by mouth daily, Disp: 7 packet, Rfl: 0     senna-docusate (SENOKOT-S/PERICOLACE) 8.6-50 MG tablet, Take 1-2 tablets by mouth 2 times daily Take while on oral narcotics to prevent or treat constipation., Disp: 30 tablet, Rfl: 0     traMADol (ULTRAM) 50 MG tablet, Take 0.5-1 tablets (25-50 mg) by mouth every 6 hours as needed for severe pain, Disp: 50 tablet, Rfl: 0  Allergies: Patient has no known allergies.    Physical Exam:  On physical examination the patient appears the stated age, is in no acute distress, affect is appropriate, and breathing is non-labored.  There were no vitals taken for this visit.  There is no height or weight on file to calculate  BMI.  Gait: with a cane minimal limp  Incision: healed, benign    Assessment: doing very. Maybe overdoing it a little   Plan: continue with PT, back off a little with activities.     Miguel Angel Rhoades

## 2022-03-31 NOTE — PROGRESS NOTES
Chief Complaint: Surgical Followup (2 weeks s/p Left ARLEEN DOS 3/16/22)       HPI: Lane Bosch returns today in follow-up for his left hip. He presents today with a cane. He is using 50 mg of tramadol 1-2 times a day. Working with a physical therapist and riding a stationary bike for an hour at a time at home.     Medications and allergies are documented in the EMR and have been reviewed.    Current Outpatient Medications:      acetaminophen (TYLENOL) 325 MG tablet, Take 2 tablets (650 mg) by mouth every 4 hours, Disp: 100 tablet, Rfl: 0     aspirin 81 MG EC tablet, Take 2 tablets (162 mg) by mouth daily, Disp: 60 tablet, Rfl: 0     GAVILAX 17 GM/SCOOP powder, , Disp: , Rfl:      hydrOXYzine (ATARAX) 25 MG tablet, Take 1 tablet (25 mg) by mouth every 6 hours as needed for itching or anxiety (with pain, moderate pain), Disp: 30 tablet, Rfl: 0     lisinopril (ZESTRIL) 20 MG tablet, Take 1 tablet (20 mg) by mouth daily, Disp: 90 tablet, Rfl: 3     polyethylene glycol (MIRALAX) 17 g packet, Take 17 g by mouth daily, Disp: 7 packet, Rfl: 0     senna-docusate (SENOKOT-S/PERICOLACE) 8.6-50 MG tablet, Take 1-2 tablets by mouth 2 times daily Take while on oral narcotics to prevent or treat constipation., Disp: 30 tablet, Rfl: 0     traMADol (ULTRAM) 50 MG tablet, Take 0.5-1 tablets (25-50 mg) by mouth every 6 hours as needed for severe pain, Disp: 50 tablet, Rfl: 0  Allergies: Patient has no known allergies.    Physical Exam:  On physical examination the patient appears the stated age, is in no acute distress, affect is appropriate, and breathing is non-labored.  There were no vitals taken for this visit.  There is no height or weight on file to calculate BMI.  Gait: with a cane minimal limp  Incision: healed, benign    Assessment: doing very. Maybe overdoing it a little   Plan: continue with PT, back off a little with activities.     Miguel Angel Rhoades

## 2022-03-31 NOTE — NURSING NOTE
Reason For Visit:   Chief Complaint   Patient presents with     Surgical Followup     2 weeks s/p Left ARLEEN DOS 3/16/22   Was doing well. Sudden onset of new pain. Small slip on the stairs yesterday, no fall. Pain was increasing prior to the slip.    There were no vitals taken for this visit.         Judie Irene, ATC

## 2022-04-04 ENCOUNTER — HOSPITAL ENCOUNTER (OUTPATIENT)
Dept: PHYSICAL THERAPY | Facility: CLINIC | Age: 57
Setting detail: THERAPIES SERIES
Discharge: HOME | End: 2022-04-04
Attending: ORTHOPAEDIC SURGERY
Payer: COMMERCIAL

## 2022-04-04 PROCEDURE — 97112 NEUROMUSCULAR REEDUCATION: CPT | Mod: GP | Performed by: PHYSICAL THERAPIST

## 2022-04-04 PROCEDURE — 97116 GAIT TRAINING THERAPY: CPT | Mod: GP | Performed by: PHYSICAL THERAPIST

## 2022-04-12 ENCOUNTER — HOSPITAL ENCOUNTER (OUTPATIENT)
Dept: PHYSICAL THERAPY | Facility: CLINIC | Age: 57
Setting detail: THERAPIES SERIES
Discharge: HOME | End: 2022-04-12
Attending: ORTHOPAEDIC SURGERY
Payer: COMMERCIAL

## 2022-04-12 PROCEDURE — 97140 MANUAL THERAPY 1/> REGIONS: CPT | Mod: GP | Performed by: PHYSICAL THERAPIST

## 2022-04-12 PROCEDURE — 97110 THERAPEUTIC EXERCISES: CPT | Mod: GP | Performed by: PHYSICAL THERAPIST

## 2022-04-20 DIAGNOSIS — Z47.89 ORTHOPEDIC AFTERCARE: Primary | ICD-10-CM

## 2022-04-21 ENCOUNTER — HOSPITAL ENCOUNTER (OUTPATIENT)
Dept: PHYSICAL THERAPY | Facility: CLINIC | Age: 57
Setting detail: THERAPIES SERIES
Discharge: HOME | End: 2022-04-21
Attending: ORTHOPAEDIC SURGERY
Payer: COMMERCIAL

## 2022-04-21 PROCEDURE — 97140 MANUAL THERAPY 1/> REGIONS: CPT | Mod: GP | Performed by: PHYSICAL THERAPIST

## 2022-04-21 PROCEDURE — 97110 THERAPEUTIC EXERCISES: CPT | Mod: GP | Performed by: PHYSICAL THERAPIST

## 2022-04-28 ENCOUNTER — OFFICE VISIT (OUTPATIENT)
Dept: ORTHOPEDICS | Facility: CLINIC | Age: 57
End: 2022-04-28
Payer: COMMERCIAL

## 2022-04-28 DIAGNOSIS — Z47.89 ORTHOPEDIC AFTERCARE: Primary | ICD-10-CM

## 2022-04-28 PROCEDURE — 99024 POSTOP FOLLOW-UP VISIT: CPT | Performed by: ORTHOPAEDIC SURGERY

## 2022-04-28 ASSESSMENT — HOOS JR
RISING FROM SITTING: MILD
HOOS JR TOTAL INTERVAL SCORE: 92.34

## 2022-04-28 NOTE — LETTER
"    4/28/2022         RE: Lane Bosch  [address] [facility] [address]        Dear Colleague,    Thank you for referring your patient, Lane Bosch, to the [facility] [place]. Please see a copy of my visit note below.    Chief Complaint: No chief complaint on file.   6 weeks left total hip     HPI: Lane Bosch returns today in follow-up for his left hip. He is doing very well. No pain, no assist device. Reports that sleeping well \"is the best part.\" Happy with how he is doing so far.     HOOS: 92.34    Medications and allergies are documented in the EMR and have been reviewed.    Current Outpatient Medications:      acetaminophen (TYLENOL) 325 MG tablet, Take 2 tablets (650 mg) by mouth every 4 hours, Disp: 100 tablet, Rfl: 0     aspirin 81 MG EC tablet, Take 2 tablets (162 mg) by mouth daily, Disp: 60 tablet, Rfl: 0     GAVILAX 17 GM/SCOOP powder, , Disp: , Rfl:      hydrOXYzine (ATARAX) 25 MG tablet, Take 1 tablet (25 mg) by mouth every 6 hours as needed for itching or anxiety (with pain, moderate pain), Disp: 30 tablet, Rfl: 0     lisinopril (ZESTRIL) 20 MG tablet, Take 1 tablet (20 mg) by mouth daily, Disp: 90 tablet, Rfl: 3     polyethylene glycol (MIRALAX) 17 g packet, Take 17 g by mouth daily, Disp: 7 packet, Rfl: 0     senna-docusate (SENOKOT-S/PERICOLACE) 8.6-50 MG tablet, Take 1-2 tablets by mouth 2 times daily Take while on oral narcotics to prevent or treat constipation., Disp: 30 tablet, Rfl: 0     traMADol (ULTRAM) 50 MG tablet, Take 0.5-1 tablets (25-50 mg) by mouth every 6 hours as needed for severe pain, Disp: 50 tablet, Rfl: 0  Allergies: Patient has no known allergies.    Physical Exam:  On physical examination the patient appears the stated age, is in no acute distress, affect is appropriate, and breathing is non-labored.  There were no vitals taken for this visit.  There is no height or weight on file to calculate BMI.  Gait: normal "   Incision: healed   ROM: fluid and painless    X-rays:    I reviewed the x-rays dated today.  Previous films reviewed.    Findings:  Normal progression for a total hip arthroplasty without evidence of loosening or subsidence.    Assessment: Six weeks, left total hip, doing very well   Plan: RTC 6 weeks, sooner if issues, virtual is appropriate.    Miguel Angel Rhoades

## 2022-04-28 NOTE — NURSING NOTE
Reason For Visit:   Chief Complaint   Patient presents with     RECHECK     6 weeks s/p left ARLEEN DOS 3/16/22       There were no vitals taken for this visit.         Judie Irene, ATC

## 2022-04-28 NOTE — PROGRESS NOTES
"Chief Complaint: No chief complaint on file.   6 weeks left total hip     HPI: Lane Bosch returns today in follow-up for his left hip. He is doing very well. No pain, no assist device. Reports that sleeping well \"is the best part.\" Happy with how he is doing so far.     HOOS: 92.34    Medications and allergies are documented in the EMR and have been reviewed.    Current Outpatient Medications:      acetaminophen (TYLENOL) 325 MG tablet, Take 2 tablets (650 mg) by mouth every 4 hours, Disp: 100 tablet, Rfl: 0     aspirin 81 MG EC tablet, Take 2 tablets (162 mg) by mouth daily, Disp: 60 tablet, Rfl: 0     GAVILAX 17 GM/SCOOP powder, , Disp: , Rfl:      hydrOXYzine (ATARAX) 25 MG tablet, Take 1 tablet (25 mg) by mouth every 6 hours as needed for itching or anxiety (with pain, moderate pain), Disp: 30 tablet, Rfl: 0     lisinopril (ZESTRIL) 20 MG tablet, Take 1 tablet (20 mg) by mouth daily, Disp: 90 tablet, Rfl: 3     polyethylene glycol (MIRALAX) 17 g packet, Take 17 g by mouth daily, Disp: 7 packet, Rfl: 0     senna-docusate (SENOKOT-S/PERICOLACE) 8.6-50 MG tablet, Take 1-2 tablets by mouth 2 times daily Take while on oral narcotics to prevent or treat constipation., Disp: 30 tablet, Rfl: 0     traMADol (ULTRAM) 50 MG tablet, Take 0.5-1 tablets (25-50 mg) by mouth every 6 hours as needed for severe pain, Disp: 50 tablet, Rfl: 0  Allergies: Patient has no known allergies.    Physical Exam:  On physical examination the patient appears the stated age, is in no acute distress, affect is appropriate, and breathing is non-labored.  There were no vitals taken for this visit.  There is no height or weight on file to calculate BMI.  Gait: normal   Incision: healed   ROM: fluid and painless    X-rays:    I reviewed the x-rays dated today.  Previous films reviewed.    Findings:  Normal progression for a total hip arthroplasty without evidence of loosening or subsidence.    Assessment: Six weeks, left total hip, doing very " well   Plan: RTC 6 weeks, sooner if issues, virtual is appropriate.    Miguel Angel Rhoades

## 2022-05-09 ENCOUNTER — HOSPITAL ENCOUNTER (OUTPATIENT)
Dept: PHYSICAL THERAPY | Facility: CLINIC | Age: 57
Setting detail: THERAPIES SERIES
Discharge: HOME | End: 2022-05-09
Attending: ORTHOPAEDIC SURGERY
Payer: COMMERCIAL

## 2022-05-09 PROCEDURE — 97112 NEUROMUSCULAR REEDUCATION: CPT | Mod: GP | Performed by: PHYSICAL THERAPIST

## 2022-05-21 ENCOUNTER — HEALTH MAINTENANCE LETTER (OUTPATIENT)
Age: 57
End: 2022-05-21

## 2022-06-09 ENCOUNTER — VIRTUAL VISIT (OUTPATIENT)
Dept: ORTHOPEDICS | Facility: CLINIC | Age: 57
End: 2022-06-09
Payer: COMMERCIAL

## 2022-06-09 DIAGNOSIS — Z47.89 ORTHOPEDIC AFTERCARE: Primary | ICD-10-CM

## 2022-06-09 PROCEDURE — 99024 POSTOP FOLLOW-UP VISIT: CPT | Performed by: ORTHOPAEDIC SURGERY

## 2022-06-09 ASSESSMENT — HOOS JR: HOOS JR TOTAL INTERVAL SCORE: 100

## 2022-06-09 NOTE — PROGRESS NOTES
"Chief Complaint: RECHECK (12 week s/p Left ARLEEN DOS 3/16/22)       HPI: Lane Bosch returns today in follow-up for his hip. He reports that \"I couldn't be happier.\" Back to his desired activities and is pain free. Happy with how he is doing.     ALVERTO Jr: 100  Medications and allergies are documented in the EMR and have been reviewed.    Current Outpatient Medications:      lisinopril (ZESTRIL) 20 MG tablet, Take 1 tablet (20 mg) by mouth daily, Disp: 90 tablet, Rfl: 3     acetaminophen (TYLENOL) 325 MG tablet, Take 2 tablets (650 mg) by mouth every 4 hours (Patient not taking: Reported on 6/9/2022), Disp: 100 tablet, Rfl: 0     aspirin 81 MG EC tablet, Take 2 tablets (162 mg) by mouth daily (Patient not taking: Reported on 6/9/2022), Disp: 60 tablet, Rfl: 0     GAVILAX 17 GM/SCOOP powder, , Disp: , Rfl:      hydrOXYzine (ATARAX) 25 MG tablet, Take 1 tablet (25 mg) by mouth every 6 hours as needed for itching or anxiety (with pain, moderate pain) (Patient not taking: Reported on 6/9/2022), Disp: 30 tablet, Rfl: 0     polyethylene glycol (MIRALAX) 17 g packet, Take 17 g by mouth daily (Patient not taking: Reported on 6/9/2022), Disp: 7 packet, Rfl: 0     senna-docusate (SENOKOT-S/PERICOLACE) 8.6-50 MG tablet, Take 1-2 tablets by mouth 2 times daily Take while on oral narcotics to prevent or treat constipation. (Patient not taking: Reported on 6/9/2022), Disp: 30 tablet, Rfl: 0     traMADol (ULTRAM) 50 MG tablet, Take 0.5-1 tablets (25-50 mg) by mouth every 6 hours as needed for severe pain (Patient not taking: Reported on 6/9/2022), Disp: 50 tablet, Rfl: 0  Allergies: Patient has no known allergies.     Assessment and Plan : 12 weeks s/p left total hip, doing well. Appropriate for one year follow-up, sooner if issues.     F=Referred Self    Telephone visit of 5 minutes.   "

## 2022-06-09 NOTE — NURSING NOTE
Reason For Visit:   Chief Complaint   Patient presents with     RECHECK     12 week s/p Left ARLEEN DOS 3/16/22       There were no vitals taken for this visit.         Judie Irene, ATC

## 2022-06-09 NOTE — LETTER
"    6/9/2022         RE: Lane Bosch  [address] [address]        Dear Colleague,    Thank you for referring your patient, Lane Bosch, to the [facility] [place]. Please see a copy of my visit note below.    Chief Complaint: RECHECK (12 week s/p Left ARLEEN DOS 3/16/22)       HPI: Lane Bosch returns today in follow-up for his hip. He reports that \"I couldn't be happier.\" Back to his desired activities and is pain free. Happy with how he is doing.     ANTONYOS Jr: 100  Medications and allergies are documented in the EMR and have been reviewed.    Current Outpatient Medications:      lisinopril (ZESTRIL) 20 MG tablet, Take 1 tablet (20 mg) by mouth daily, Disp: 90 tablet, Rfl: 3     acetaminophen (TYLENOL) 325 MG tablet, Take 2 tablets (650 mg) by mouth every 4 hours (Patient not taking: Reported on 6/9/2022), Disp: 100 tablet, Rfl: 0     aspirin 81 MG EC tablet, Take 2 tablets (162 mg) by mouth daily (Patient not taking: Reported on 6/9/2022), Disp: 60 tablet, Rfl: 0     GAVILAX 17 GM/SCOOP powder, , Disp: , Rfl:      hydrOXYzine (ATARAX) 25 MG tablet, Take 1 tablet (25 mg) by mouth every 6 hours as needed for itching or anxiety (with pain, moderate pain) (Patient not taking: Reported on 6/9/2022), Disp: 30 tablet, Rfl: 0     polyethylene glycol (MIRALAX) 17 g packet, Take 17 g by mouth daily (Patient not taking: Reported on 6/9/2022), Disp: 7 packet, Rfl: 0     senna-docusate (SENOKOT-S/PERICOLACE) 8.6-50 MG tablet, Take 1-2 tablets by mouth 2 times daily Take while on oral narcotics to prevent or treat constipation. (Patient not taking: Reported on 6/9/2022), Disp: 30 tablet, Rfl: 0     traMADol (ULTRAM) 50 MG tablet, Take 0.5-1 tablets (25-50 mg) by mouth every 6 hours as needed for severe pain (Patient not taking: Reported on 6/9/2022), Disp: 50 tablet, Rfl: 0  Allergies: Patient has no known allergies.     Assessment and Plan : 12 weeks s/p left total " hip, doing well. Appropriate for one year follow-up, sooner if issues.     F=Referred Self    Telephone visit of 5 minutes.     Miguel Angel Rhoades MD

## 2022-06-17 NOTE — ANESTHESIA CARE TRANSFER NOTE
Patient: Lane Bosch    Procedure(s):  COLONOSCOPY, WITH POLYPECTOMY AND BIOPSY    Diagnosis: Special screening for malignant neoplasms, colon [Z12.11]  Diagnosis Additional Information: No value filed.    Anesthesia Type:   MAC     Note:  Airway :Room Air  Patient transferred to:Phase II  Handoff Report: Identifed the Patient, Identified the Reponsible Provider, Reviewed the pertinent medical history, Discussed the surgical course, Reviewed Intra-OP anesthesia mangement and issues during anesthesia, Set expectations for post-procedure period and Allowed opportunity for questions and acknowledgement of understanding      Vitals: (Last set prior to Anesthesia Care Transfer)    CRNA VITALS  10/5/2020 0725 - 10/5/2020 0756      10/5/2020             Pulse:  57    Ht Rate:  59    SpO2:  100 %                Electronically Signed By: MARCELA Hansen CRNA  October 5, 2020  7:56 AM   Cyclosporine Counseling:  I discussed with the patient the risks of cyclosporine including but not limited to hypertension, gingival hyperplasia,myelosuppression, immunosuppression, liver damage, kidney damage, neurotoxicity, lymphoma, and serious infections. The patient understands that monitoring is required including baseline blood pressure, CBC, CMP, lipid panel and uric acid, and then 1-2 times monthly CMP and blood pressure.

## 2022-08-11 DIAGNOSIS — I10 BENIGN ESSENTIAL HYPERTENSION: ICD-10-CM

## 2022-08-12 RX ORDER — LISINOPRIL 20 MG/1
TABLET ORAL
Qty: 90 TABLET | Refills: 0 | Status: SHIPPED | OUTPATIENT
Start: 2022-08-12 | End: 2022-08-16

## 2022-08-12 NOTE — TELEPHONE ENCOUNTER
"Requested Prescriptions   Pending Prescriptions Disp Refills    lisinopril (ZESTRIL) 20 MG tablet [Pharmacy Med Name: LISINOPRIL 20MG TABLETS] 90 tablet 3     Sig: TAKE 1 TABLET(20 MG) BY MOUTH DAILY        ACE Inhibitors (Including Combos) Protocol Failed - 8/11/2022  8:35 AM        Failed - Blood pressure under 140/90 in past 12 months       BP Readings from Last 3 Encounters:   03/18/22 (!) 154/69   02/22/22 124/68   10/05/21 (!) 158/85                 Passed - Recent (12 mo) or future (30 days) visit within the authorizing provider's specialty     Patient has had an office visit with the authorizing provider or a provider within the authorizing providers department within the previous 12 mos or has a future within next 30 days. See \"Patient Info\" tab in inbasket, or \"Choose Columns\" in Meds & Orders section of the refill encounter.              Passed - Medication is active on med list        Passed - Patient is age 18 or older        Passed - Normal serum creatinine on file in past 12 months     Recent Labs   Lab Test 02/22/22  0933   CR 0.80       Ok to refill medication if creatinine is low          Passed - Normal serum potassium on file in past 12 months     Recent Labs   Lab Test 02/22/22  0933   POTASSIUM 3.9                     "

## 2022-08-12 NOTE — TELEPHONE ENCOUNTER
Blood pressures were too high at the last clinic visit.  Please make a nurse only visit through our scheduling line (this visit is free) for a blood pressure check.  If you have a home cuff please bring it in to compare it to our blood pressure machine.  Sent 90 days refill  Notify patient will need in clinic appt with Dr. Mckeon for refills.  Thank you,  Franc Mckeon MD      Thank you,  Franc Mckeon MD

## 2022-08-16 ENCOUNTER — TELEPHONE (OUTPATIENT)
Dept: FAMILY MEDICINE | Facility: CLINIC | Age: 57
End: 2022-08-16

## 2022-08-16 ENCOUNTER — ALLIED HEALTH/NURSE VISIT (OUTPATIENT)
Dept: FAMILY MEDICINE | Facility: CLINIC | Age: 57
End: 2022-08-16
Payer: COMMERCIAL

## 2022-08-16 VITALS — SYSTOLIC BLOOD PRESSURE: 124 MMHG | HEART RATE: 50 BPM | DIASTOLIC BLOOD PRESSURE: 82 MMHG | OXYGEN SATURATION: 99 %

## 2022-08-16 DIAGNOSIS — I10 BENIGN ESSENTIAL HYPERTENSION: Primary | ICD-10-CM

## 2022-08-16 DIAGNOSIS — I10 BENIGN ESSENTIAL HYPERTENSION: ICD-10-CM

## 2022-08-16 PROCEDURE — 99207 PR NO CHARGE NURSE ONLY: CPT

## 2022-08-16 RX ORDER — LISINOPRIL 20 MG/1
20 TABLET ORAL DAILY
Qty: 90 TABLET | Refills: 1 | Status: SHIPPED | OUTPATIENT
Start: 2022-08-16 | End: 2023-05-04

## 2022-08-16 NOTE — TELEPHONE ENCOUNTER
Second manual BP is in goal.  Home BP cuff not accurate and giving too high numbers.  Monitor home BPs again, but know that they are higher than actual BPs or get new home cuff.  Due for in clinic appt for BP medication renewal in February 2023.  Sent refills.  Thank you,  Franc Mckeon MD

## 2022-08-16 NOTE — PROGRESS NOTES
Lane Bosch is a 57 year old year old patient who comes in today for a Blood Pressure check because of ongoing blood pressure monitoring.    Vital Signs as repeated by /80 p 50, 124/82 p 50    Pt brought home cuff. 1st reading 158/88 before 1st manual bp  2nd reading 140/82 after the 2nd manual bp so is reading higher than manuals    Patient is taking medication as prescribed    Patient is tolerating medications well.    Patient is monitoring Blood Pressure at home.  Average readings if yes are has not recently because has had good bp but will start monitoring again      Current complaints: none    Disposition:  patient instructed to await provider response-pt ? If he still needs to schedule appt with provider?    Jimmy Burroughs, RN

## 2022-08-17 NOTE — TELEPHONE ENCOUNTER
Patient is notified of his home bp cuff not being accurate.  When he is due for office visit in February and the refills. Jessica MARSHALL RN

## 2022-08-22 ENCOUNTER — TELEPHONE (OUTPATIENT)
Dept: ORTHOPEDICS | Facility: CLINIC | Age: 57
End: 2022-08-22

## 2022-08-22 DIAGNOSIS — Z96.649 HISTORY OF TOTAL HIP REPLACEMENT, UNSPECIFIED LATERALITY: Primary | ICD-10-CM

## 2022-08-22 RX ORDER — AMOXICILLIN 500 MG/1
CAPSULE ORAL
Qty: 4 CAPSULE | Refills: 0 | Status: SHIPPED | OUTPATIENT
Start: 2022-08-22 | End: 2023-05-18

## 2022-08-22 NOTE — TELEPHONE ENCOUNTER
[facility] Call Center    Phone Message    May a detailed message be left on voicemail: yes     Reason for Call: Medication Refill Request    Has the patient contacted the pharmacy for the refill? Yes   Name of medication being requested: Dental antibiotics   Provider who prescribed the medication: Jf   Pharmacy: [facility] in [place]   Date medication is needed: today       Action Taken: Message routed to:  [facility]): orhto    Travel Screening: Not Applicable     Please c/b once processed -- dentist appt is next week

## 2022-09-17 ENCOUNTER — HEALTH MAINTENANCE LETTER (OUTPATIENT)
Age: 57
End: 2022-09-17

## 2023-02-18 ENCOUNTER — MYC MEDICAL ADVICE (OUTPATIENT)
Dept: FAMILY MEDICINE | Facility: CLINIC | Age: 58
End: 2023-02-18
Payer: COMMERCIAL

## 2023-02-20 NOTE — TELEPHONE ENCOUNTER
MyChart reply sent to patient and closing encounter.     Emma Benedict RN  [facility]

## 2023-05-18 ENCOUNTER — OFFICE VISIT (OUTPATIENT)
Dept: FAMILY MEDICINE | Facility: CLINIC | Age: 58
End: 2023-05-18
Payer: COMMERCIAL

## 2023-05-18 VITALS
BODY MASS INDEX: 29.38 KG/M2 | HEIGHT: 65 IN | TEMPERATURE: 97.2 F | WEIGHT: 176.38 LBS | HEART RATE: 52 BPM | RESPIRATION RATE: 16 BRPM | SYSTOLIC BLOOD PRESSURE: 130 MMHG | DIASTOLIC BLOOD PRESSURE: 75 MMHG | OXYGEN SATURATION: 99 %

## 2023-05-18 DIAGNOSIS — E78.00 ELEVATED LDL CHOLESTEROL LEVEL: ICD-10-CM

## 2023-05-18 DIAGNOSIS — I10 BENIGN ESSENTIAL HYPERTENSION: Primary | ICD-10-CM

## 2023-05-18 DIAGNOSIS — I10 BENIGN ESSENTIAL HYPERTENSION: ICD-10-CM

## 2023-05-18 LAB
ANION GAP SERPL CALCULATED.3IONS-SCNC: 8 MMOL/L (ref 7–15)
BUN SERPL-MCNC: 13.1 MG/DL (ref 8–23)
CALCIUM SERPL-MCNC: 10.1 MG/DL (ref 8.8–10.2)
CHLORIDE SERPL-SCNC: 104 MMOL/L (ref 98–107)
CHOLEST SERPL-MCNC: 182 MG/DL
CREAT SERPL-MCNC: 0.83 MG/DL (ref 0.67–1.17)
DEPRECATED HCO3 PLAS-SCNC: 29 MMOL/L (ref 22–29)
GFR SERPL CREATININE-BSD FRML MDRD: >90 ML/MIN/1.73M2
GLUCOSE SERPL-MCNC: 87 MG/DL (ref 70–99)
HDLC SERPL-MCNC: 60 MG/DL
LDLC SERPL CALC-MCNC: 106 MG/DL
NONHDLC SERPL-MCNC: 122 MG/DL
POTASSIUM SERPL-SCNC: 4.8 MMOL/L (ref 3.4–5.3)
SODIUM SERPL-SCNC: 141 MMOL/L (ref 136–145)
TRIGL SERPL-MCNC: 79 MG/DL

## 2023-05-18 PROCEDURE — 36415 COLL VENOUS BLD VENIPUNCTURE: CPT | Performed by: STUDENT IN AN ORGANIZED HEALTH CARE EDUCATION/TRAINING PROGRAM

## 2023-05-18 PROCEDURE — 99213 OFFICE O/P EST LOW 20 MIN: CPT | Performed by: STUDENT IN AN ORGANIZED HEALTH CARE EDUCATION/TRAINING PROGRAM

## 2023-05-18 PROCEDURE — 80048 BASIC METABOLIC PNL TOTAL CA: CPT | Performed by: STUDENT IN AN ORGANIZED HEALTH CARE EDUCATION/TRAINING PROGRAM

## 2023-05-18 PROCEDURE — 80061 LIPID PANEL: CPT | Performed by: STUDENT IN AN ORGANIZED HEALTH CARE EDUCATION/TRAINING PROGRAM

## 2023-05-18 RX ORDER — LISINOPRIL 20 MG/1
20 TABLET ORAL DAILY
Qty: 90 TABLET | Refills: 3 | Status: SHIPPED | OUTPATIENT
Start: 2023-05-18 | End: 2024-06-17

## 2023-05-18 RX ORDER — ATORVASTATIN CALCIUM 40 MG/1
40 TABLET, FILM COATED ORAL DAILY
Qty: 90 TABLET | Refills: 3 | Status: SHIPPED | OUTPATIENT
Start: 2023-05-18

## 2023-05-18 ASSESSMENT — PAIN SCALES - GENERAL: PAINLEVEL: NO PAIN (0)

## 2023-05-18 NOTE — PROGRESS NOTES
"  1. Benign essential hypertension, at goal <140/90   > appears to be well controlled   - refilled lisinopril (ZESTRIL) 20 MG tablet; Take 1 tablet (20 mg) by mouth daily  Dispense: 90 tablet; Refill: 3  - Basic metabolic panel  (Ca, Cl, CO2, Creat, Gluc, K, Na, BUN)  - Lipid panel reflex to direct LDL Non-fasting  - discussed diet and exercise, patient has a low salt diet and endorses exercising     Subjective   Clarke is a 58 year old, presenting for the following health issues:  Hypertension        5/18/2023     2:09 PM   Additional Questions   Roomed by Kayy Crews CMA     History of Present Illness       Hypertension: He presents for follow up of hypertension.  He does not check blood pressure  regularly outside of the clinic. Outside blood pressures have been over 140/90. He follows a low salt diet. He consumes 0 sweetened beverage(s) daily.He exercises with enough effort to increase his heart rate 60 or more minutes per day.  He exercises with enough effort to increase his heart rate 6 days per week.   He is taking medications regularly.       Review of Systems   As above      Objective    /75 (BP Location: Right arm, Patient Position: Sitting, Cuff Size: Adult Regular)   Pulse 52   Temp 97.2  F (36.2  C) (Tympanic)   Resp 16   Ht 1.641 m (5' 4.61\")   Wt 80 kg (176 lb 6 oz)   SpO2 99%   BMI 29.71 kg/m    Body mass index is 29.71 kg/m .  Physical Exam  Constitutional:       General: He is not in acute distress.  HENT:      Head: Normocephalic and atraumatic.      Right Ear: External ear normal.      Left Ear: External ear normal.      Mouth/Throat:      Mouth: Mucous membranes are moist.      Pharynx: Oropharynx is clear. No oropharyngeal exudate or posterior oropharyngeal erythema.   Eyes:      Extraocular Movements: Extraocular movements intact.   Cardiovascular:      Rate and Rhythm: Normal rate and regular rhythm.      Heart sounds: Normal heart sounds.   Pulmonary:      Effort: Pulmonary " effort is normal. No respiratory distress.      Breath sounds: Normal breath sounds. No wheezing or rhonchi.   Abdominal:      Palpations: Abdomen is soft. There is no mass.      Tenderness: There is no abdominal tenderness.   Musculoskeletal:         General: No deformity. Normal range of motion.      Cervical back: Normal range of motion and neck supple.   Skin:     General: Skin is warm.      Findings: No rash.   Neurological:      General: No focal deficit present.      Mental Status: He is alert and oriented to person, place, and time.   Psychiatric:         Mood and Affect: Mood normal.

## 2023-05-18 NOTE — RESULT ENCOUNTER NOTE
Antoni Bosch,     It is a pleasure providing you with medical care. I have received and reviewed your lab results, and have the following recommendations:     Thank you for providing us with lab work.  Based on the results of your basic metabolic panel your results were within normal limits indicating you are not suspected to have electrolyte or kidney abnormalities.     Unfortunately, it does appear that he your LDL was elevated slightly on your lipid panel.  I calculate your risk of having a heart attack or stroke in the next 10 years to be 8.3%.  I know you have been a lot of good work with diet and exercise, because of your history of high blood pressure and this 8.3% risk for future cardiac events, I do recommend starting you on a cholesterol-lowering based medication called atorvastatin/Lipitor 40 mg by mouth daily.     Some side effects that people noticed when taking statins is that it could cause muscle aches, if you experience any of the symptoms please let me know and we may have to consider decreasing your dose.     Additionally, please try to limit your dietary intake of fatty, greasy, oily, fried, take out, and fast food when possible. Also, I would suggest you cut back on red and processed meats, sodium and sugar-sweetened foods and beverages.     Regarding exercise, please get at least 30 minutes of CONTINUOUS moderate intensity aerobic exercise at least 3 times per week.      Sincerely,     Lori Hernandez MD

## 2023-06-04 ENCOUNTER — HEALTH MAINTENANCE LETTER (OUTPATIENT)
Age: 58
End: 2023-06-04

## 2023-08-07 NOTE — PROGRESS NOTES
05/09/22 0800   Appointment Info   Signing clinician's name / credentials Matt Toro PT [facility]   Visits Used 6   Subjective Report   Subjective Report hip is doing well.  Gaining strength. questions about restrictions long term and quad atrophy   Neuromuscular Re-education   Neuromuscular re-ed of mvmt, balance, coord, kinesthetic sense, posture, proprioception minutes ([id number]) 30   Skilled Intervention HEP prog   Patient Response/Progress improving control, still weak compared to right side   Treatment Detail ball bridge progression, arms at side, arms xd, LE raise and LE lift single x 10,  supine HS curl x 10 on ball, up  2 stairs at a time with 2 rails x 4 no pain   Plan   Plan for next session nearning HEP independence   Comments   Comments pt urged to contact surgeon for long term restriction status   Ortho Goal 1   Goal Identifier STG1   Goal Description Pt will be independent with HEP to ensure gradual return to previous level of function    Target Date 04/13/22   Ortho Goal 2   Goal Identifier LTG1   Goal Description Pt will be able to put on shoes/socks with minimal difficulty and pain    Target Date 05/04/22   Goal Progress still stiff into flexion   Ortho Goal 3   Goal Identifier LTG2   Goal Description Pt will improve LEFS score by >25 points to display significant improvement in functional activity tolerance    Target Date 05/18/22   Ortho Goal 4   Goal Identifier LTG3   Goal Description Pt will return to all ADL/IADL/leisure activities with hip pain no greater than 1/10   Target Date 05/18/22   Ortho Goal 5   Goal Identifier LTG4   Goal Description Pt will display ability to ascend/descend stairs x 10 with reciprocal gait pattern and no compensatory movement patterns,    Target Date 05/18/22   Date Met 05/09/22         DISCHARGE  Reason for Discharge: Patient has failed to schedule further appointments.    Discharge Plan: Patient to continue home program.    Referring Provider:  Miguel Angel Rhoades

## 2023-08-23 ENCOUNTER — TELEPHONE (OUTPATIENT)
Dept: ORTHOPEDICS | Facility: CLINIC | Age: 58
End: 2023-08-23
Payer: COMMERCIAL

## 2023-08-23 DIAGNOSIS — Z96.642 STATUS POST TOTAL HIP REPLACEMENT, LEFT: Primary | ICD-10-CM

## 2023-08-23 RX ORDER — AMOXICILLIN 500 MG/1
2000 CAPSULE ORAL ONCE
Qty: 8 CAPSULE | Refills: 1 | Status: SHIPPED | OUTPATIENT
Start: 2023-08-23 | End: 2023-08-23

## 2023-08-23 NOTE — TELEPHONE ENCOUNTER
[facility] Call Center    Phone Message    May a detailed message be left on voicemail: yes     Reason for Call: Other: Patient is requesting antibiotics for upcoming dental cleaning on 9/6. Patient prefers [facility] pharmacy in [place]. Requesting two doses for possible second dental appointment.      Action Taken: Message routed to:  [facility] [phone]    Travel Screening: Not Applicable

## 2023-08-23 NOTE — TELEPHONE ENCOUNTER
S/p Left total hip arthroplasty, DOS: 3/16/22  Medication refilled per standing orders.    Dhaval Knox RN

## 2023-08-29 ENCOUNTER — LAB (OUTPATIENT)
Dept: LAB | Facility: CLINIC | Age: 58
End: 2023-08-29
Payer: COMMERCIAL

## 2023-08-29 DIAGNOSIS — N20.0 KIDNEY STONE: ICD-10-CM

## 2023-08-29 PROCEDURE — 82365 CALCULUS SPECTROSCOPY: CPT | Mod: 90

## 2023-08-29 PROCEDURE — 99000 SPECIMEN HANDLING OFFICE-LAB: CPT

## 2023-09-01 LAB
APPEARANCE STONE: NORMAL
COMPN STONE: NORMAL
SPECIMEN WT: 27 MG

## 2024-01-09 ENCOUNTER — OFFICE VISIT (OUTPATIENT)
Dept: FAMILY MEDICINE | Facility: CLINIC | Age: 59
End: 2024-01-09
Payer: COMMERCIAL

## 2024-01-09 VITALS
OXYGEN SATURATION: 98 % | SYSTOLIC BLOOD PRESSURE: 136 MMHG | DIASTOLIC BLOOD PRESSURE: 72 MMHG | BODY MASS INDEX: 24.93 KG/M2 | TEMPERATURE: 98.4 F | RESPIRATION RATE: 16 BRPM | WEIGHT: 168.3 LBS | HEART RATE: 53 BPM | HEIGHT: 69 IN

## 2024-01-09 DIAGNOSIS — L82.1 SEBORRHEIC KERATOSIS: ICD-10-CM

## 2024-01-09 DIAGNOSIS — L57.0 ACTINIC KERATOSIS: ICD-10-CM

## 2024-01-09 DIAGNOSIS — L98.9 SKIN LESION OF FACE: Primary | ICD-10-CM

## 2024-01-09 PROCEDURE — 99213 OFFICE O/P EST LOW 20 MIN: CPT | Performed by: STUDENT IN AN ORGANIZED HEALTH CARE EDUCATION/TRAINING PROGRAM

## 2024-01-09 ASSESSMENT — PAIN SCALES - GENERAL: PAINLEVEL: NO PAIN (0)

## 2024-01-09 NOTE — PROGRESS NOTES
1. Skin lesion of face  > etiology unclear, possible rosacea vs angioma   - Adult Dermatology  Referral; Future  - given concern for possible rosacea will trial topical metroNIDAZOLE (METROCREAM) 0.75 % external cream; Apply topically 2 times daily On affected area on nose, keep away from eyes and mouth wash hands after use  Dispense: 45 g; Refill: 0    2. Actinic keratosis  - s/p liquid nitrogen for 5 seconds for 2 cycles     3. Seborrheic keratosis  - reassurance provided         Subjective   Clarke is a 58 year old, presenting for the following health issues:  Skin Spots (On nose - red lesions/marks)      1/9/2024     1:57 PM   Additional Questions   Roomed by Maria D CROOKS LPN   Accompanied by self         1/9/2024     1:57 PM   Patient Reported Additional Medications   Patient reports taking the following new medications no new meds       History of Present Illness       Reason for visit:  Skin    He eats 4 or more servings of fruits and vegetables daily.He consumes 2 sweetened beverage(s) daily.He exercises with enough effort to increase his heart rate 60 or more minutes per day.    He is taking medications regularly.    Skin Lesion  Onset/Duration: 6 months  Description  Location: nose and one on his back - he says the one on his back looks like squames cell - the one on his nose never heals  Color: pink  Border description: irregular border  Character: blotchy  Itching: no  Bleeding:  No  Intensity:  moderate  Progression of Symptoms:  worsening  Accompanying signs and symptoms:   Bleeding: No  Scaling: No  Excessive sun exposure/tanning: YES  Sunscreen used: No  History:           Any previous history of skin cancer: No  Any family history of melanoma: YES- mom had basal cell cancer  Previous episodes of similar lesion: had previously had some pre-cancerous things frozen off his face by Dr. Mckeon in the past.   Precipitating or alleviating factors: none  Therapies tried and outcome: none      Review  of Systems   As above       Objective    There were no vitals taken for this visit.  There is no height or weight on file to calculate BMI.  Physical Exam  Constitutional:       General: He is not in acute distress.     Appearance: Normal appearance.   HENT:      Head: Normocephalic.   Eyes:      General: No scleral icterus.        Right eye: No discharge.         Left eye: No discharge.      Extraocular Movements: Extraocular movements intact.      Conjunctiva/sclera: Conjunctivae normal.   Pulmonary:      Effort: Pulmonary effort is normal. No respiratory distress.   Musculoskeletal:         General: Normal range of motion.      Cervical back: Normal range of motion.   Skin:     General: Skin is warm.      Comments: See images below for skin lesions    Patient also had two areas of seborrheic keratosis on the posterior lateral aspect of his right upper arm   AK on right cheek was treated with liquid nitrogen x2 rounds for 3-5 seconds    Neurological:      General: No focal deficit present.      Mental Status: He is alert and oriented to person, place, and time.   Psychiatric:         Mood and Affect: Mood normal.         Behavior: Behavior normal.

## 2024-03-13 ENCOUNTER — TELEPHONE (OUTPATIENT)
Dept: ORTHOPEDICS | Facility: CLINIC | Age: 59
End: 2024-03-13
Payer: COMMERCIAL

## 2024-03-13 DIAGNOSIS — Z96.649 HISTORY OF TOTAL HIP REPLACEMENT, UNSPECIFIED LATERALITY: ICD-10-CM

## 2024-03-13 RX ORDER — AMOXICILLIN 500 MG/1
CAPSULE ORAL
Qty: 4 CAPSULE | Refills: 3 | Status: SHIPPED | OUTPATIENT
Start: 2024-03-13

## 2024-03-13 NOTE — TELEPHONE ENCOUNTER
[facility]    Phone Message    May a detailed message be left on voicemail: no     Reason for Call: needs antibiotic for dental appt on 03/20  [facility]

## 2024-04-02 ENCOUNTER — OFFICE VISIT (OUTPATIENT)
Dept: DERMATOLOGY | Facility: CLINIC | Age: 59
End: 2024-04-02
Payer: COMMERCIAL

## 2024-04-02 DIAGNOSIS — L82.1 SEBORRHEIC KERATOSES: ICD-10-CM

## 2024-04-02 DIAGNOSIS — D23.9 DERMAL NEVUS: Primary | ICD-10-CM

## 2024-04-02 DIAGNOSIS — D18.01 ANGIOMA OF SKIN: ICD-10-CM

## 2024-04-02 DIAGNOSIS — L57.0 AK (ACTINIC KERATOSIS): ICD-10-CM

## 2024-04-02 DIAGNOSIS — L81.4 LENTIGO: ICD-10-CM

## 2024-04-02 PROCEDURE — 99243 OFF/OP CNSLTJ NEW/EST LOW 30: CPT | Performed by: DERMATOLOGY

## 2024-04-02 NOTE — PROGRESS NOTES
Lane Bosch , a 59 year old year old male patient, I was asked to see by Dr. Hernandez  for spots on face.  Patient has no other skin complaints today.  Remainder of the HPI, Meds, PMH, Allergies, FH, and SH was reviewed in chart.      Past Medical History:   Diagnosis Date    Hypertension     Osteoarthritis of left hip 3/16/2022       Past Surgical History:   Procedure Laterality Date    ARTHROPLASTY HIP Left 3/16/2022    Procedure: Left total hip arthroplasty;  Surgeon: Miguel Angel Rhoades MD;  Location: [facility]    COLONOSCOPY N/A 10/5/2020    Procedure: COLONOSCOPY, WITH POLYPECTOMY AND BIOPSY;  Surgeon: Jackie Ruiz MD;  Location: [facility]    HEMILAMINECTOMY, DISCECTOMY LUMBAR ONE LEVEL, COMBINED  12/13/2012     L4-5    ZZC CYSTOTOMY,REPAIR URETEROCELE  2015        Family History   Problem Relation Age of Onset    Prostate Cancer Father     Other Cancer Father         pancreatic    Prostate Cancer Paternal Grandfather     Other - See Comments Son         cleft palete       Social History     Socioeconomic History    Marital status:      Spouse name: Not on file    Number of children: Not on file    Years of education: Not on file    Highest education level: Not on file   Occupational History    Not on file   Tobacco Use    Smoking status: Never    Smokeless tobacco: Never   Vaping Use    Vaping Use: Never used   Substance and Sexual Activity    Alcohol use: No    Drug use: No    Sexual activity: Yes     Partners: Female     Birth control/protection: Female Surgical   Other Topics Concern    Parent/sibling w/ CABG, MI or angioplasty before 65F 55M? No   Social History Narrative    Not on file     Social Determinants of Health     Financial Resource Strain: Low Risk  (1/9/2024)    Financial Resource Strain     Within the past 12 months, have you or your family members you live with been unable to get utilities (heat, electricity) when it was really needed?: No   Food Insecurity: Low Risk  (1/9/2024)    Food  Insecurity     Within the past 12 months, did you worry that your food would run out before you got money to buy more?: No     Within the past 12 months, did the food you bought just not last and you didn t have money to get more?: No   Transportation Needs: Low Risk  (1/9/2024)    Transportation Needs     Within the past 12 months, has lack of transportation kept you from medical appointments, getting your medicines, non-medical meetings or appointments, work, or from getting things that you need?: No   Physical Activity: Not on file   Stress: Not on file   Social Connections: Not on file   Interpersonal Safety: Low Risk  (1/9/2024)    Interpersonal Safety     Do you feel physically and emotionally safe where you currently live?: Yes     Within the past 12 months, have you been hit, slapped, kicked or otherwise physically hurt by someone?: No     Within the past 12 months, have you been humiliated or emotionally abused in other ways by your partner or ex-partner?: No   Housing Stability: Low Risk  (1/9/2024)    Housing Stability     Do you have housing? : Yes     Are you worried about losing your housing?: No       Outpatient Encounter Medications as of 4/2/2024   Medication Sig Dispense Refill    amoxicillin (AMOXIL) 500 MG capsule Take 4 caps (2000mg) 30-60 minutes before dental procedure 4 capsule 3    atorvastatin (LIPITOR) 40 MG tablet Take 1 tablet (40 mg) by mouth daily (Patient not taking: Reported on 1/9/2024) 90 tablet 3    lisinopril (ZESTRIL) 20 MG tablet Take 1 tablet (20 mg) by mouth daily 90 tablet 3    metroNIDAZOLE (METROCREAM) 0.75 % external cream Apply topically 2 times daily On affected area on nose, keep away from eyes and mouth wash hands after use 45 g 0     No facility-administered encounter medications on file as of 4/2/2024.             Review Of Systems  Skin: As above  Eyes: negative  Ears/Nose/Throat: negative  Respiratory: No shortness of breath, dyspnea on exertion, cough, or  hemoptysis  Cardiovascular: negative  Gastrointestinal: negative  Genitourinary: negative  Musculoskeletal: negative  Neurologic: negative  Psychiatric: negative  Hematologic/Lymphatic/Immunologic: negative  Endocrine: negative      O:   NAD, WDWN, Alert & Oriented, Mood & Affect wnl, Vitals stable   General appearance silvia ii   Vitals stable   Alert, oriented and in no acute distress   Gritty scaly papule on scalp and nose  Red papules on nose and face  Stuck on papules and brown macules on scalp   Red papules on face   Flesh colored papules on face      Eyes: Conjunctivae/lids:Normal     ENT: Lips, buccal mucosa, tongue: normal    MSK:Normal    Cardiovascular: peripheral edema none    Pulm: Breathing Normal    Neuro/Psych: Orientation:Normal; Mood/Affect:Normal      A/P:  1. Seborrheic keratosis, lentigo, angioma, dermal nevus  2. Actinic keratosis   Using 5-Flurouracil Cream    5-Fluorouracil (5FU) topical cream (brand names Efudex, Carac) is a prescription topical medicine to treat actinic keratoses (pre-squamous cell skin cancer lesions), sun-damaged skin as well as superficial skin cancers.    When applied the areas of sun-damaged skin, the 5FU will  find  damaged skin cells and destroy them.   During treatment, the skin will become red and look very irritated. This is the expected  normal response,  Some patients using 5FU show minimal redness and scaling while others have a very  vigorous  response where the skin scabs and peels. The important thing to realize is that 5FU is treating sun-damaged skin that carries skin cancer risk.        While the skin is irritated, open, sore or scabbed you can apply aquaphor, vaseline or 1% hydrocortisone cream in the morning.      You should apply a thin layer of the cream to the affected area twice a day for 2  weeks every night. A strip of cream the length of your finger tip should be enough to cover your entire face.  For tougher skin like arms, legs, or back, we may  suggest longer treatment plans.  However if you react really strong and fast, you might stop earlier or use less frequently. - please call if it is very strong and you are concern you might need to stop early.      If you prescription coverage allows we may add calcipotriene (Dovonex ), a vitamin-D derivative, to the treatment plan.  In these cases we will have you mix the calcipotriene with the efudex to help shorten the treatment course and improve outcomes.      Typically very strong reactions are related to lots of underlying sun damage, and this means you are getting a good response to the medication. However, there is no need to be miserable while using this. Please let us know if you are having trouble or concerns!     It was a pleasure speaking to Lane Bosch today.  Previous clinic  notes and pertinent laboratory tests were reviewed prior to Lane Bosch's visit.  Signs and Symptoms of skin cancer discussed with patient.  Patient encouraged to perform monthly skin exams.  UV precautions reviewed with patient.  Return to clinic 3 months

## 2024-04-02 NOTE — PATIENT INSTRUCTIONS
Using 5-Flurouracil Cream        5-Fluorouracil (5FU) topical cream (brand names Efudex, Carac) is a prescription topical medicine to treat actinic keratoses (pre-squamous cell skin cancer lesions), sun-damaged skin as well as superficial skin cancers.    When applied the areas of sun-damaged skin, the 5FU will  find  damaged skin cells and destroy them.   During treatment, the skin will become red and look very irritated. This is the expected  normal response,  Some patients using 5FU show minimal redness and scaling while others have a very  vigorous  response where the skin scabs and peels. The important thing to realize is that 5FU is treating sun-damaged skin that carries skin cancer risk.      While the skin is irritated, open, sore or scabbed you can apply aquaphor, vaseline or 1% hydrocortisone cream in the morning.     You should apply a thin layer of the cream to the affected area twice a day for 2  weeks every night. A strip of cream the length of your finger tip should be enough to cover your entire face.  For tougher skin like arms, legs, or back, we may suggest longer treatment plans.  However if you react really strong and fast, you might stop earlier or use less frequently. - please call if it is very strong and you are concern you might need to stop early.        Typically very strong reactions are related to lots of underlying sun damage, and this means you are getting a good response to the medication. However, there is no need to be miserable while using this. Please let us know if you are having trouble or concerns!      Patient Education       Proper skin care from [facility]:    -Eliminate harsh soaps as they strip the natural oils from the skin, often resulting in dry itchy skin ( i.e. Dial, Zest, [place] Spring)  -Use mild soaps such as Cetaphil or Dove Sensitive Skin in the shower. You do not need to use soap on arms, legs, and trunk every time you shower unless visibly soiled.    -Avoid hot or cold showers.  -After showering, lightly dry off and apply moisturizing within 2-3 minutes. This will help trap moisture in the skin.   -Aggressive use of a moisturizer at least 1-2 times a day to the entire body (including -Vanicream, Cetaphil, Aquaphor or Cerave) and moisturize hands after every washing.  -We recommend using moisturizers that come in a tub that needs to be scooped out, not a pump. This has more of an oil base. It will hold moisture in your skin much better than a water base moisturizer. The above recommended are non-pore clogging.      Wear a sunscreen with at least SPF 30 on your face, ears, neck and V of the chest daily. Wear sunscreen on other areas of the body if those areas are exposed to the sun throughout the day. Sunscreens can contain physical and/or chemical blockers. Physical blockers are less likely to clog pores, these include zinc oxide and titanium dioxide. Reapply every two hour and after swimming.     Sunscreen examples: https://www.ewg.org/sunscreen/    UV radiation  UVA radiation remains constant throughout the day and throughout the year. It is a longer wavelength than UVB and therefore penetrates deeper into the skin leading to immediate and delayed tanning, photoaging, and skin cancer. 70-80% of UVA and UVB radiation occurs between the hours of 10am-2pm.  UVB radiation  UVB radiation causes the most harmful effects and is more significant during the summer months. However, snow and ice can reflect UVB radiation leading to skin damage during the winter months as well. UVB radiation is responsible for tanning, burning, inflammation, delayed erythema (pinkness), pigmentation (brown spots), and skin cancer.     I recommend self monthly full body exams and yearly full body exams with a dermatology provider. If you develop a new or changing lesion please follow up for examination. Most skin cancers are pink and scaly or pink and pearly. However, we do see  blue/brown/black skin cancers.  Consider the ABCDEs of melanoma when giving yourself your monthly full body exam ( don't forget the groin, buttocks, feet, toes, etc). A-asymmetry, B-borders, C-color, D-diameter, E-elevation or evolving. If you see any of these changes please follow up in clinic. If you cannot see your back I recommend purchasing a hand held mirror to use with a larger wall mirror.       Checking for Skin Cancer  You can find cancer early by checking your skin each month. There are 3 kinds of skin cancer. They are melanoma, basal cell carcinoma, and squamous cell carcinoma. Doing monthly skin checks is the best way to find new marks or skin changes. Follow the instructions below for checking your skin.   The ABCDEs of checking moles for melanoma   Check your moles or growths for signs of melanoma using ABCDE:   Asymmetry: the sides of the mole or growth don t match  Border: the edges are ragged, notched, or blurred  Color: the color within the mole or growth varies  Diameter: the mole or growth is larger than 6 mm (size of a pencil eraser)  Evolving: the size, shape, or color of the mole or growth is changing (evolving is not shown in the images below)    Checking for other types of skin cancer  Basal cell carcinoma or squamous cell carcinoma have symptoms such as:     A spot or mole that looks different from all other marks on your skin  Changes in how an area feels, such as itching, tenderness, or pain  Changes in the skin's surface, such as oozing, bleeding, or scaliness  A sore that does not heal  New swelling or redness beyond the border of a mole    Who s at risk?  Anyone can get skin cancer. But you are at greater risk if you have:   Fair skin, light-colored hair, or light-colored eyes  Many moles or abnormal moles on your skin  A history of sunburns from sunlight or tanning beds  A family history of skin cancer  A history of exposure to radiation or chemicals  A weakened immune system  If you  have had skin cancer in the past, you are at risk for recurring skin cancer.   How to check your skin  Do your monthly skin checkups in front of a full-length mirror. Check all parts of your body, including your:   Head (ears, face, neck, and scalp)  Torso (front, back, and sides)  Arms (tops, undersides, upper, and lower armpits)  Hands (palms, backs, and fingers, including under the nails)  Buttocks and genitals  Legs (front, back, and sides)  Feet (tops, soles, toes, including under the nails, and between toes)  If you have a lot of moles, take digital photos of them each month. Make sure to take photos both up close and from a distance. These can help you see if any moles change over time.   Most skin changes are not cancer. But if you see any changes in your skin, call your doctor right away. Only he or she can diagnose a problem. If you have skin cancer, seeing your doctor can be the first step toward getting the treatment that could save your life.   [employer] last reviewed this educational content on 4/1/2019 2000-2020 [employer]. [address] [place] [address]. All rights reserved. This information is not intended as a substitute for professional medical care. Always follow your healthcare professional's instructions.       When should I call my doctor?  If you are worsening or not improving, please, contact us or seek urgent care as noted below.     Who should I call with questions (adults)?  [facility] (adult and pediatric): [phone]  [facility] (adult): [phone]  [facility] ([place], [place], [place] and [place]) [phone]  For urgent needs outside of business hours call the [facility] at [phone] and ask for the dermatology resident on call to be paged  If this is a medical emergency and you are unable to reach an ER, Call [phone]      If you need a prescription  refill, please contact your pharmacy. Refills are approved or denied by our Physicians during normal business hours, Monday through Fridays  Per office policy, refills will not be granted if you have not been seen within the past year (or sooner depending on your child's condition)

## 2024-04-02 NOTE — LETTER
4/2/2024         RE: Lane Bosch  [address] [address]        Dear Colleague,    Thank you for referring your patient, Lane Bosch, to the [facility]. Please see a copy of my visit note below.    Lane Bosch , a 59 year old year old male patient, I was asked to see by Dr. Hernandez  for spots on face.  Patient has no other skin complaints today.  Remainder of the HPI, Meds, PMH, Allergies, FH, and SH was reviewed in chart.      Past Medical History:   Diagnosis Date     Hypertension      Osteoarthritis of left hip 3/16/2022       Past Surgical History:   Procedure Laterality Date     ARTHROPLASTY HIP Left 3/16/2022    Procedure: Left total hip arthroplasty;  Surgeon: Miguel Angel Rhoades MD;  Location: [facility] OR     COLONOSCOPY N/A 10/5/2020    Procedure: COLONOSCOPY, WITH POLYPECTOMY AND BIOPSY;  Surgeon: Jackie Ruiz MD;  Location: [facility]     HEMILAMINECTOMY, DISCECTOMY LUMBAR ONE LEVEL, COMBINED  12/13/2012     L4-5     ZZC CYSTOTOMY,REPAIR URETEROCELE  2015        Family History   Problem Relation Age of Onset     Prostate Cancer Father      Other Cancer Father         pancreatic     Prostate Cancer Paternal Grandfather      Other - See Comments Son         cleft palete       Social History     Socioeconomic History     Marital status:      Spouse name: Not on file     Number of children: Not on file     Years of education: Not on file     Highest education level: Not on file   Occupational History     Not on file   Tobacco Use     Smoking status: Never     Smokeless tobacco: Never   Vaping Use     Vaping Use: Never used   Substance and Sexual Activity     Alcohol use: No     Drug use: No     Sexual activity: Yes     Partners: Female     Birth control/protection: Female Surgical   Other Topics Concern     Parent/sibling w/ CABG, MI or angioplasty before 65F 55M? No   Social History Narrative     Not on file     Social Determinants of Health      Financial Resource Strain: Low Risk  (1/9/2024)    Financial Resource Strain      Within the past 12 months, have you or your family members you live with been unable to get utilities (heat, electricity) when it was really needed?: No   Food Insecurity: Low Risk  (1/9/2024)    Food Insecurity      Within the past 12 months, did you worry that your food would run out before you got money to buy more?: No      Within the past 12 months, did the food you bought just not last and you didn t have money to get more?: No   Transportation Needs: Low Risk  (1/9/2024)    Transportation Needs      Within the past 12 months, has lack of transportation kept you from medical appointments, getting your medicines, non-medical meetings or appointments, work, or from getting things that you need?: No   Physical Activity: Not on file   Stress: Not on file   Social Connections: Not on file   Interpersonal Safety: Low Risk  (1/9/2024)    Interpersonal Safety      Do you feel physically and emotionally safe where you currently live?: Yes      Within the past 12 months, have you been hit, slapped, kicked or otherwise physically hurt by someone?: No      Within the past 12 months, have you been humiliated or emotionally abused in other ways by your partner or ex-partner?: No   Housing Stability: Low Risk  (1/9/2024)    Housing Stability      Do you have housing? : Yes      Are you worried about losing your housing?: No       Outpatient Encounter Medications as of 4/2/2024   Medication Sig Dispense Refill     amoxicillin (AMOXIL) 500 MG capsule Take 4 caps (2000mg) 30-60 minutes before dental procedure 4 capsule 3     atorvastatin (LIPITOR) 40 MG tablet Take 1 tablet (40 mg) by mouth daily (Patient not taking: Reported on 1/9/2024) 90 tablet 3     lisinopril (ZESTRIL) 20 MG tablet Take 1 tablet (20 mg) by mouth daily 90 tablet 3     metroNIDAZOLE (METROCREAM) 0.75 % external cream Apply topically 2 times daily On affected area on nose,  keep away from eyes and mouth wash hands after use 45 g 0     No facility-administered encounter medications on file as of 4/2/2024.             Review Of Systems  Skin: As above  Eyes: negative  Ears/Nose/Throat: negative  Respiratory: No shortness of breath, dyspnea on exertion, cough, or hemoptysis  Cardiovascular: negative  Gastrointestinal: negative  Genitourinary: negative  Musculoskeletal: negative  Neurologic: negative  Psychiatric: negative  Hematologic/Lymphatic/Immunologic: negative  Endocrine: negative      O:   NAD, WDWN, Alert & Oriented, Mood & Affect wnl, Vitals stable   General appearance silvia ii   Vitals stable   Alert, oriented and in no acute distress   Gritty scaly papule on scalp and nose  Red papules on nose and face  Stuck on papules and brown macules on scalp   Red papules on face   Flesh colored papules on face      Eyes: Conjunctivae/lids:Normal     ENT: Lips, buccal mucosa, tongue: normal    MSK:Normal    Cardiovascular: peripheral edema none    Pulm: Breathing Normal    Neuro/Psych: Orientation:Normal; Mood/Affect:Normal      A/P:  1. Seborrheic keratosis, lentigo, angioma, dermal nevus  2. Actinic keratosis   Using 5-Flurouracil Cream    5-Fluorouracil (5FU) topical cream (brand names Efudex, Carac) is a prescription topical medicine to treat actinic keratoses (pre-squamous cell skin cancer lesions), sun-damaged skin as well as superficial skin cancers.    When applied the areas of sun-damaged skin, the 5FU will  find  damaged skin cells and destroy them.   During treatment, the skin will become red and look very irritated. This is the expected  normal response,  Some patients using 5FU show minimal redness and scaling while others have a very  vigorous  response where the skin scabs and peels. The important thing to realize is that 5FU is treating sun-damaged skin that carries skin cancer risk.        While the skin is irritated, open, sore or scabbed you can apply aquaphor, vaseline  or 1% hydrocortisone cream in the morning.      You should apply a thin layer of the cream to the affected area twice a day for 2  weeks every night. A strip of cream the length of your finger tip should be enough to cover your entire face.  For tougher skin like arms, legs, or back, we may suggest longer treatment plans.  However if you react really strong and fast, you might stop earlier or use less frequently. - please call if it is very strong and you are concern you might need to stop early.      If you prescription coverage allows we may add calcipotriene (Dovonex ), a vitamin-D derivative, to the treatment plan.  In these cases we will have you mix the calcipotriene with the efudex to help shorten the treatment course and improve outcomes.      Typically very strong reactions are related to lots of underlying sun damage, and this means you are getting a good response to the medication. However, there is no need to be miserable while using this. Please let us know if you are having trouble or concerns!     It was a pleasure speaking to Lane Bosch today.  Previous clinic  notes and pertinent laboratory tests were reviewed prior to Lane Bosch's visit.  Signs and Symptoms of skin cancer discussed with patient.  Patient encouraged to perform monthly skin exams.  UV precautions reviewed with patient.  Return to clinic 3 months      Again, thank you for allowing me to participate in the care of your patient.        Sincerely,        Monty Marquez MD
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
## 2024-06-14 DIAGNOSIS — I10 BENIGN ESSENTIAL HYPERTENSION: ICD-10-CM

## 2024-06-17 PROBLEM — Z71.89 ADVANCED DIRECTIVES, COUNSELING/DISCUSSION: Status: RESOLVED | Noted: 2018-11-16 | Resolved: 2024-06-17

## 2024-06-17 RX ORDER — LISINOPRIL 20 MG/1
20 TABLET ORAL DAILY
Qty: 90 TABLET | Refills: 0 | Status: SHIPPED | OUTPATIENT
Start: 2024-06-17 | End: 2024-07-09

## 2024-07-09 ENCOUNTER — OFFICE VISIT (OUTPATIENT)
Dept: FAMILY MEDICINE | Facility: CLINIC | Age: 59
End: 2024-07-09
Payer: COMMERCIAL

## 2024-07-09 VITALS
DIASTOLIC BLOOD PRESSURE: 82 MMHG | RESPIRATION RATE: 20 BRPM | OXYGEN SATURATION: 98 % | HEIGHT: 69 IN | HEART RATE: 57 BPM | TEMPERATURE: 97.9 F | WEIGHT: 161.7 LBS | BODY MASS INDEX: 23.95 KG/M2 | SYSTOLIC BLOOD PRESSURE: 130 MMHG

## 2024-07-09 DIAGNOSIS — Z12.5 SCREENING FOR PROSTATE CANCER: ICD-10-CM

## 2024-07-09 DIAGNOSIS — I10 BENIGN ESSENTIAL HYPERTENSION: ICD-10-CM

## 2024-07-09 DIAGNOSIS — Z23 HIGH PRIORITY FOR 2019-NCOV VACCINE: ICD-10-CM

## 2024-07-09 DIAGNOSIS — Z83.2 FAMILY HISTORY OF ANEMIA: ICD-10-CM

## 2024-07-09 DIAGNOSIS — Z00.00 ROUTINE GENERAL MEDICAL EXAMINATION AT A HEALTH CARE FACILITY: Primary | ICD-10-CM

## 2024-07-09 DIAGNOSIS — Z13.220 LIPID SCREENING: ICD-10-CM

## 2024-07-09 LAB
ANION GAP SERPL CALCULATED.3IONS-SCNC: 10 MMOL/L (ref 7–15)
BASOPHILS # BLD AUTO: 0 10E3/UL (ref 0–0.2)
BASOPHILS NFR BLD AUTO: 1 %
BUN SERPL-MCNC: 15.4 MG/DL (ref 8–23)
CALCIUM SERPL-MCNC: 9 MG/DL (ref 8.8–10.2)
CHLORIDE SERPL-SCNC: 104 MMOL/L (ref 98–107)
CREAT SERPL-MCNC: 0.79 MG/DL (ref 0.67–1.17)
DEPRECATED HCO3 PLAS-SCNC: 26 MMOL/L (ref 22–29)
EGFRCR SERPLBLD CKD-EPI 2021: >90 ML/MIN/1.73M2
EOSINOPHIL # BLD AUTO: 0.1 10E3/UL (ref 0–0.7)
EOSINOPHIL NFR BLD AUTO: 2 %
ERYTHROCYTE [DISTWIDTH] IN BLOOD BY AUTOMATED COUNT: 11.6 % (ref 10–15)
GLUCOSE SERPL-MCNC: 85 MG/DL (ref 70–99)
HCT VFR BLD AUTO: 41 % (ref 40–53)
HGB BLD-MCNC: 13.8 G/DL (ref 13.3–17.7)
IMM GRANULOCYTES # BLD: 0 10E3/UL
IMM GRANULOCYTES NFR BLD: 0 %
LYMPHOCYTES # BLD AUTO: 1.2 10E3/UL (ref 0.8–5.3)
LYMPHOCYTES NFR BLD AUTO: 19 %
MCH RBC QN AUTO: 29 PG (ref 26.5–33)
MCHC RBC AUTO-ENTMCNC: 33.7 G/DL (ref 31.5–36.5)
MCV RBC AUTO: 86 FL (ref 78–100)
MONOCYTES # BLD AUTO: 0.6 10E3/UL (ref 0–1.3)
MONOCYTES NFR BLD AUTO: 9 %
NEUTROPHILS # BLD AUTO: 4.3 10E3/UL (ref 1.6–8.3)
NEUTROPHILS NFR BLD AUTO: 69 %
PLATELET # BLD AUTO: 258 10E3/UL (ref 150–450)
POTASSIUM SERPL-SCNC: 3.8 MMOL/L (ref 3.4–5.3)
PSA SERPL DL<=0.01 NG/ML-MCNC: 1.48 NG/ML (ref 0–4.5)
RBC # BLD AUTO: 4.76 10E6/UL (ref 4.4–5.9)
SODIUM SERPL-SCNC: 140 MMOL/L (ref 135–145)
WBC # BLD AUTO: 6.2 10E3/UL (ref 4–11)

## 2024-07-09 PROCEDURE — 80048 BASIC METABOLIC PNL TOTAL CA: CPT | Performed by: STUDENT IN AN ORGANIZED HEALTH CARE EDUCATION/TRAINING PROGRAM

## 2024-07-09 PROCEDURE — 36415 COLL VENOUS BLD VENIPUNCTURE: CPT | Performed by: STUDENT IN AN ORGANIZED HEALTH CARE EDUCATION/TRAINING PROGRAM

## 2024-07-09 PROCEDURE — 99213 OFFICE O/P EST LOW 20 MIN: CPT | Mod: 25 | Performed by: STUDENT IN AN ORGANIZED HEALTH CARE EDUCATION/TRAINING PROGRAM

## 2024-07-09 PROCEDURE — 99396 PREV VISIT EST AGE 40-64: CPT | Mod: 25 | Performed by: STUDENT IN AN ORGANIZED HEALTH CARE EDUCATION/TRAINING PROGRAM

## 2024-07-09 PROCEDURE — 90480 ADMN SARSCOV2 VAC 1/ONLY CMP: CPT | Performed by: STUDENT IN AN ORGANIZED HEALTH CARE EDUCATION/TRAINING PROGRAM

## 2024-07-09 PROCEDURE — G0103 PSA SCREENING: HCPCS | Performed by: STUDENT IN AN ORGANIZED HEALTH CARE EDUCATION/TRAINING PROGRAM

## 2024-07-09 PROCEDURE — 85025 COMPLETE CBC W/AUTO DIFF WBC: CPT | Performed by: STUDENT IN AN ORGANIZED HEALTH CARE EDUCATION/TRAINING PROGRAM

## 2024-07-09 PROCEDURE — 91320 SARSCV2 VAC 30MCG TRS-SUC IM: CPT | Performed by: STUDENT IN AN ORGANIZED HEALTH CARE EDUCATION/TRAINING PROGRAM

## 2024-07-09 RX ORDER — DEXTROAMPHETAMINE SACCHARATE, AMPHETAMINE ASPARTATE MONOHYDRATE, DEXTROAMPHETAMINE SULFATE AND AMPHETAMINE SULFATE 5; 5; 5; 5 MG/1; MG/1; MG/1; MG/1
20 CAPSULE, EXTENDED RELEASE ORAL DAILY
COMMUNITY
Start: 2024-06-18

## 2024-07-09 RX ORDER — LISINOPRIL 20 MG/1
20 TABLET ORAL DAILY
Qty: 90 TABLET | Refills: 0 | Status: SHIPPED | OUTPATIENT
Start: 2024-07-09

## 2024-07-09 SDOH — HEALTH STABILITY: PHYSICAL HEALTH: ON AVERAGE, HOW MANY DAYS PER WEEK DO YOU ENGAGE IN MODERATE TO STRENUOUS EXERCISE (LIKE A BRISK WALK)?: 6 DAYS

## 2024-07-09 SDOH — HEALTH STABILITY: PHYSICAL HEALTH: ON AVERAGE, HOW MANY MINUTES DO YOU ENGAGE IN EXERCISE AT THIS LEVEL?: 60 MIN

## 2024-07-09 ASSESSMENT — PAIN SCALES - GENERAL: PAINLEVEL: NO PAIN (0)

## 2024-07-09 ASSESSMENT — SOCIAL DETERMINANTS OF HEALTH (SDOH): HOW OFTEN DO YOU GET TOGETHER WITH FRIENDS OR RELATIVES?: MORE THAN THREE TIMES A WEEK

## 2024-07-09 NOTE — PROGRESS NOTES
Preventive Care Visit  [facility]  SHANNAN MYLES MD, Family Medicine  Jul 9, 2024      Assessment & Plan     Routine general medical examination at a health care facility  Family history of anemia  - CBC with platelets and differential; Future  - CBC with platelets and differential    Lipid screening  - Lipid panel reflex to direct LDL Fasting; Future    Screening for prostate cancer  - PSA, screen; Future  - PSA, screen    High priority for 2019-nCoV vaccine  > administered today in clinic    Benign essential hypertension, currently at goal <140/90  - lisinopril (ZESTRIL) 20 MG tablet; Take 1 tablet (20 mg) by mouth daily  - Basic metabolic panel  (Ca, Cl, CO2, Creat, Gluc, K, Na, BUN); Future  - Basic metabolic panel  (Ca, Cl, CO2, Creat, Gluc, K, Na, BUN)    Follow-up plan:   - patient will fill out RUPINDER from facility where he was diagnosed and treated for ADHD so we can continue him on stimulant based therapy, PDMP reviewed, patient's history matches records from PDMP     Radha Mir is a 59 year old, presenting for the following:  Physical, LAB REQUEST (Would like PSA, hemoglobin and lipids checked - not fasting), Health Maintenance (Would like covid-19 vaccine today), Recheck Medication, and Hypertension        7/9/2024    11:07 AM   Additional Questions   Roomed by Maria D CROOKS LPN   Accompanied by self         7/9/2024    11:07 AM   Patient Reported Additional Medications   Patient reports taking the following new medications no new meds        Health Care Directive  Patient does not have a Health Care Directive or Living Will: Discussed advance care planning with patient; information given to patient to review.    HPI       Was recently started on adderallXR 20mg by an outside provider ([facility])   Patient was increased to 30mg, but felt that his symptoms were controlled at 20mg with less risks of side effects   Patient is interested in switching over to  [facility] for management of ADHD     Hypertension Follow-up    Do you check your blood pressure regularly outside of the clinic? Yes   Are you following a low salt diet? Yes  Are your blood pressures ever more than 140 on the top number (systolic) OR more   than 90 on the bottom number (diastolic), for example 140/90? No  Medication Followup of Lisinopril 20 mg daily  Taking Medication as prescribed: yes  Side Effects:  None  Medication Helping Symptoms:  yes        7/9/2024   General Health   How would you rate your overall physical health? Good   Feel stress (tense, anxious, or unable to sleep) To some extent      (!) STRESS CONCERN      7/9/2024   Nutrition   Three or more servings of calcium each day? Yes   Diet: Vegetarian/vegan   How many servings of fruit and vegetables per day? 4 or more   How many sweetened beverages each day? 0-1            7/9/2024   Exercise   Days per week of moderate/strenous exercise 6 days   Average minutes spent exercising at this level 60 min            7/9/2024   Social Factors   Frequency of gathering with friends or relatives More than three times a week   Worry food won't last until get money to buy more No   Food not last or not have enough money for food? No   Do you have housing? (Housing is defined as stable permanent housing and does not include staying ouside in a car, in a tent, in an abandoned building, in an overnight shelter, or couch-surfing.) Yes   Are you worried about losing your housing? No   Lack of transportation? No   Unable to get utilities (heat,electricity)? No            7/9/2024   Fall Risk   Fallen 2 or more times in the past year? No   Trouble with walking or balance? No             7/9/2024   Dental   Dentist two times every year? Yes            7/9/2024   TB Screening   Were you born outside of the [place]? No      Today's PHQ-2 Score:       7/9/2024    11:14 AM   PHQ-2 ( 1999 Pfizer)   Q1: Little interest or pleasure in doing things 0   Q2: Feeling down,  "depressed or hopeless 0   PHQ-2 Score 0         7/9/2024   Substance Use   Alcohol more than 3/day or more than 7/wk No   Do you use any other substances recreationally? No        Social History     Tobacco Use    Smoking status: Never    Smokeless tobacco: Never   Vaping Use    Vaping status: Never Used   Substance Use Topics    Alcohol use: No    Drug use: No           7/9/2024   STI Screening   New sexual partner(s) since last STI/HIV test? No      Last PSA:   PSA   Date Value Ref Range Status   09/08/2020 1.29 0 - 4 ug/L Final     Comment:     Assay Method:  Chemiluminescence using Siemens Vista analyzer     Prostate Specific Antigen Screen   Date Value Ref Range Status   02/22/2022 1.89 0.00 - 4.00 ug/L Final     ASCVD Risk   The 10-year ASCVD risk score (Russell RODRÍGUEZ, et al., 2019) is: 9.1%    Values used to calculate the score:      Age: 59 years      Sex: Male      Is Non- : No      Diabetic: No      Tobacco smoker: No      Systolic Blood Pressure: 130 mmHg      Is BP treated: Yes      HDL Cholesterol: 60 mg/dL      Total Cholesterol: 182 mg/dL         Reviewed and updated as needed this visit by Provider                    Review of Systems   Constitutional:  Negative for chills and fever.   HENT:  Negative for ear pain.    Eyes:  Negative for pain.   Respiratory:  Negative for cough.    Cardiovascular:  Negative for chest pain.   Gastrointestinal:  Negative for abdominal pain.   Genitourinary:  Negative for dysuria.   Musculoskeletal:  Negative for neck pain.   Skin:  Negative for rash.   Neurological:  Negative for headaches.          Objective    Exam  /82 (BP Location: Left arm, Patient Position: Sitting, Cuff Size: Adult Regular)   Pulse 57   Temp 97.9  F (36.6  C) (Tympanic)   Resp 20   Ht 1.752 m (5' 8.98\")   Wt 73.3 kg (161 lb 11.2 oz)   SpO2 98%   BMI 23.90 kg/m     Estimated body mass index is 23.9 kg/m  as calculated from the following:    Height as of " "this encounter: 1.752 m (5' 8.98\").    Weight as of this encounter: 73.3 kg (161 lb 11.2 oz).    Physical Exam  Constitutional:       General: He is not in acute distress.  HENT:      Head: Normocephalic and atraumatic.      Right Ear: External ear normal.      Left Ear: External ear normal.      Mouth/Throat:      Mouth: Mucous membranes are moist.      Pharynx: Oropharynx is clear. No oropharyngeal exudate or posterior oropharyngeal erythema.   Eyes:      Extraocular Movements: Extraocular movements intact.   Cardiovascular:      Rate and Rhythm: Normal rate and regular rhythm.      Heart sounds: Normal heart sounds.   Pulmonary:      Effort: Pulmonary effort is normal. No respiratory distress.      Breath sounds: Normal breath sounds. No wheezing or rhonchi.   Abdominal:      Palpations: Abdomen is soft. There is no mass.      Tenderness: There is no abdominal tenderness.   Musculoskeletal:         General: No deformity. Normal range of motion.      Cervical back: Normal range of motion and neck supple.   Skin:     General: Skin is warm.      Findings: No rash.   Neurological:      General: No focal deficit present.      Mental Status: He is alert and oriented to person, place, and time.   Psychiatric:         Mood and Affect: Mood normal.                Signed Electronically by: SHANNAN MYLES MD    "

## 2024-07-09 NOTE — NURSING NOTE
Immunizations Administered       Name Date Dose VIS Date Route    COVID-19 12+ (2023-24) (Pfizer) 7/9/24 11:50 AM 0.3 mL EUI,10/19/2023,Given today Intramuscular          Prior to immunization administration, verified patients identity using patient s name and date of birth. Please see Immunization Activity for additional information.     Screening Questionnaire for Adult Immunization    Are you sick today?   No   Do you have allergies to medications, food, a vaccine component or latex?   No   Have you ever had a serious reaction after receiving a vaccination?   No   Do you have a long-term health problem with heart, lung, kidney, or metabolic disease (e.g., diabetes), asthma, a blood disorder, no spleen, complement component deficiency, a cochlear implant, or a spinal fluid leak?  Are you on long-term aspirin therapy?   No   Do you have cancer, leukemia, HIV/AIDS, or any other immune system problem?   No   Do you have a parent, brother, or sister with an immune system problem?   No   In the past 3 months, have you taken medications that affect  your immune system, such as prednisone, other steroids, or anticancer drugs; drugs for the treatment of rheumatoid arthritis, Crohn s disease, or psoriasis; or have you had radiation treatments?   No   Have you had a seizure, or a brain or other nervous system problem?   No   During the past year, have you received a transfusion of blood or blood    products, or been given immune (gamma) globulin or antiviral drug?   No   For women: Are you pregnant or is there a chance you could become       pregnant during the next month?   No   Have you received any vaccinations in the past 4 weeks?   No     Immunization questionnaire answers were all negative.      Patient instructed to remain in clinic for 15 minutes afterwards, and to report any adverse reactions.     Screening performed by Maria D CROOKS LPN on 7/9/2024 at 11:58 AM.

## 2024-07-09 NOTE — PATIENT INSTRUCTIONS
Patient Education   Preventive Care Advice   This is general advice given by our system to help you stay healthy. However, your care team may have specific advice just for you. Please talk to your care team about your preventive care needs.  Nutrition  Eat 5 or more servings of fruits and vegetables each day.  Try wheat bread, brown rice and whole grain pasta (instead of white bread, rice, and pasta).  Get enough calcium and vitamin D. Check the label on foods and aim for 100% of the RDA (recommended daily allowance).  Lifestyle  Exercise at least 150 minutes each week  (30 minutes a day, 5 days a week).  Do muscle strengthening activities 2 days a week. These help control your weight and prevent disease.  No smoking.  Wear sunscreen to prevent skin cancer.  Have a dental exam and cleaning every 6 months.  Yearly exams  See your health care team every year to talk about:  Any changes in your health.  Any medicines your care team has prescribed.  Preventive care, family planning, and ways to prevent chronic diseases.  Shots (vaccines)   HPV shots (up to age 26), if you've never had them before.  Hepatitis B shots (up to age 59), if you've never had them before.  COVID-19 shot: Get this shot when it's due.  Flu shot: Get a flu shot every year.  Tetanus shot: Get a tetanus shot every 10 years.  Pneumococcal, hepatitis A, and RSV shots: Ask your care team if you need these based on your risk.  Shingles shot (for age 50 and up)  General health tests  Diabetes screening:  Starting at age 35, Get screened for diabetes at least every 3 years.  If you are younger than age 35, ask your care team if you should be screened for diabetes.  Cholesterol test: At age 39, start having a cholesterol test every 5 years, or more often if advised.  Bone density scan (DEXA): At age 50, ask your care team if you should have this scan for osteoporosis (brittle bones).  Hepatitis C: Get tested at least once in your life.  STIs (sexually  transmitted infections)  Before age 24: Ask your care team if you should be screened for STIs.  After age 24: Get screened for STIs if you're at risk. You are at risk for STIs (including HIV) if:  You are sexually active with more than one person.  You don't use condoms every time.  You or a partner was diagnosed with a sexually transmitted infection.  If you are at risk for HIV, ask about PrEP medicine to prevent HIV.  Get tested for HIV at least once in your life, whether you are at risk for HIV or not.  Cancer screening tests  Cervical cancer screening: If you have a cervix, begin getting regular cervical cancer screening tests starting at age 21.  Breast cancer scan (mammogram): If you've ever had breasts, begin having regular mammograms starting at age 40. This is a scan to check for breast cancer.  Colon cancer screening: It is important to start screening for colon cancer at age 45.  Have a colonoscopy test every 10 years (or more often if you're at risk) Or, ask your provider about stool tests like a FIT test every year or Cologuard test every 3 years.  To learn more about your testing options, visit:   .  For help making a decision, visit:   https://bit.ly/tz46117.  Prostate cancer screening test: If you have a prostate, ask your care team if a prostate cancer screening test (PSA) at age 55 is right for you.  Lung cancer screening: If you are a current or former smoker ages 50 to 80, ask your care team if ongoing lung cancer screenings are right for you.  For informational purposes only. Not to replace the advice of your health care provider. Copyright   2023 [employer]. All rights reserved. Clinically reviewed by the [facility]itions [facility]. [employer] 064905 - REV 01/24.

## 2024-07-17 ENCOUNTER — LAB (OUTPATIENT)
Dept: LAB | Facility: CLINIC | Age: 59
End: 2024-07-17
Payer: COMMERCIAL

## 2024-07-17 ENCOUNTER — OFFICE VISIT (OUTPATIENT)
Dept: DERMATOLOGY | Facility: CLINIC | Age: 59
End: 2024-07-17
Payer: COMMERCIAL

## 2024-07-17 DIAGNOSIS — L82.1 SEBORRHEIC KERATOSES: Primary | ICD-10-CM

## 2024-07-17 DIAGNOSIS — Z87.2 HISTORY OF ACTINIC KERATOSES: ICD-10-CM

## 2024-07-17 DIAGNOSIS — Z13.220 LIPID SCREENING: ICD-10-CM

## 2024-07-17 LAB
CHOLEST SERPL-MCNC: 140 MG/DL
FASTING STATUS PATIENT QL REPORTED: YES
HDLC SERPL-MCNC: 52 MG/DL
LDLC SERPL CALC-MCNC: 80 MG/DL
NONHDLC SERPL-MCNC: 88 MG/DL
TRIGL SERPL-MCNC: 40 MG/DL

## 2024-07-17 PROCEDURE — 36415 COLL VENOUS BLD VENIPUNCTURE: CPT

## 2024-07-17 PROCEDURE — 99212 OFFICE O/P EST SF 10 MIN: CPT | Performed by: DERMATOLOGY

## 2024-07-17 PROCEDURE — 80061 LIPID PANEL: CPT

## 2024-07-17 NOTE — PROGRESS NOTES
Lane Bosch is an extremely pleasant 59 year old year old male patient here today for follow up efudex did great.  HE notes one spot on left hand.  Patient has no other skin complaints today.  Remainder of the HPI, Meds, PMH, Allergies, FH, and SH was reviewed in chart.      Past Medical History:   Diagnosis Date    Hypertension     Osteoarthritis of left hip 3/16/2022       Past Surgical History:   Procedure Laterality Date    ARTHROPLASTY HIP Left 3/16/2022    Procedure: Left total hip arthroplasty;  Surgeon: Miguel Angel Rhoades MD;  [facility]    COLONOSCOPY N/A 10/5/2020    Procedure: COLONOSCOPY, WITH POLYPECTOMY AND BIOPSY;  Surgeon: Jackie Ruiz MD;  Location: [facility]    HEMILAMINECTOMY, DISCECTOMY LUMBAR ONE LEVEL, COMBINED  12/13/2012     L4-5    ZZC CYSTOTOMY,REPAIR URETEROCELE  2015        Family History   Problem Relation Age of Onset    Other Cancer Mother     Prostate Cancer Father     Other Cancer Father         pancreatic    Prostate Cancer Paternal Grandfather     Other - See Comments Son         cleft palete       Social History     Socioeconomic History    Marital status:      Spouse name: Not on file    Number of children: Not on file    Years of education: Not on file    Highest education level: Not on file   Occupational History    Not on file   Tobacco Use    Smoking status: Never    Smokeless tobacco: Never   Vaping Use    Vaping status: Never Used   Substance and Sexual Activity    Alcohol use: No    Drug use: No    Sexual activity: Yes     Partners: Female     Birth control/protection: Female Surgical   Other Topics Concern    Parent/sibling w/ CABG, MI or angioplasty before 65F 55M? No   Social History Narrative    Not on file     Social Determinants of Health     Financial Resource Strain: Low Risk  (7/9/2024)    Financial Resource Strain     Within the past 12 months, have you or your family members you live with been unable to get utilities (heat, electricity) when it was  really needed?: No   Food Insecurity: Low Risk  (7/9/2024)    Food Insecurity     Within the past 12 months, did you worry that your food would run out before you got money to buy more?: No     Within the past 12 months, did the food you bought just not last and you didn t have money to get more?: No   Transportation Needs: Low Risk  (7/9/2024)    Transportation Needs     Within the past 12 months, has lack of transportation kept you from medical appointments, getting your medicines, non-medical meetings or appointments, work, or from getting things that you need?: No   Physical Activity: Sufficiently Active (7/9/2024)    Exercise Vital Sign     Days of Exercise per Week: 6 days     Minutes of Exercise per Session: 60 min   Stress: Stress Concern Present (7/9/2024)    [employer] - Occupational Stress Questionnaire     Feeling of Stress : To some extent   Social Connections: Unknown (7/9/2024)    Social Connection and Isolation Panel [NHANES]     Frequency of Communication with Friends and Family: Not on file     Frequency of Social Gatherings with Friends and Family: More than three times a week     Attends [employer] Services: Not on file     Active Member of Clubs or Organizations: Not on file     Attends Club or Organization Meetings: Not on file     Marital Status: Not on file   Interpersonal Safety: Low Risk  (7/9/2024)    Interpersonal Safety     Do you feel physically and emotionally safe where you currently live?: Yes     Within the past 12 months, have you been hit, slapped, kicked or otherwise physically hurt by someone?: No     Within the past 12 months, have you been humiliated or emotionally abused in other ways by your partner or ex-partner?: No   Housing Stability: Low Risk  (7/9/2024)    Housing Stability     Do you have housing? : Yes     Are you worried about losing your housing?: No       Outpatient Encounter Medications as of 7/17/2024   Medication Sig Dispense Refill     amoxicillin (AMOXIL) 500 MG capsule Take 4 caps (2000mg) 30-60 minutes before dental procedure 4 capsule 3    amphetamine-dextroamphetamine (ADDERALL XR) 20 MG 24 hr capsule Take 20 mg by mouth daily      atorvastatin (LIPITOR) 40 MG tablet Take 1 tablet (40 mg) by mouth daily 90 tablet 3    COMPOUNDED NON-CONTROLLED SUBSTANCE (CMPD RX) - PHARMACY TO MIX COMPOUNDED MEDICATION Fluorouracil 5% Calcipotriene 0.005% 1:1 Cream apply twice daily for 1 weeks to nose and scalp 30 g 6    lisinopril (ZESTRIL) 20 MG tablet Take 1 tablet (20 mg) by mouth daily 90 tablet 0    metroNIDAZOLE (METROCREAM) 0.75 % external cream Apply topically 2 times daily On affected area on nose, keep away from eyes and mouth wash hands after use 45 g 0     No facility-administered encounter medications on file as of 7/17/2024.             O:   NAD, WDWN, Alert & Oriented, Mood & Affect wnl, Vitals stable   General appearance normal   Vitals stable   Alert, oriented and in no acute distress     L hand stuck on papule  Face clear of actinic keratosis       Eyes: Conjunctivae/lids:Normal     ENT: Lips, mucosa: normal    MSK:Normal    Cardiovascular: peripheral edema none    Pulm: Breathing Normal    Neuro/Psych: Orientation:Alert and Orientedx3 ; Mood/Affect:normal       A/P:  Seborrheic keratosis, hx of actinic keratosis   Did well   It was a pleasure speaking to Lane Bosch today.  Previous clinic notes and pertinent laboratory tests were reviewed prior to Lane Bosch's visit.  Nature and genetics of benign skin lesions dicussed with patient.  Patient encouraged to perform monthly skin exams.  UV precautions reviewed with patient.  Return to clinic 12 months

## 2024-07-17 NOTE — LETTER
7/17/2024      Lane Bosch  [address] [address]      Dear Colleague,    Thank you for referring your patient, Lane Bosch, to the [facility]. Please see a copy of my visit note below.    Lane Bosch is an extremely pleasant 59 year old year old male patient here today for follow up efudex did great.  HE notes one spot on left hand.  Patient has no other skin complaints today.  Remainder of the HPI, Meds, PMH, Allergies, FH, and SH was reviewed in chart.      Past Medical History:   Diagnosis Date     Hypertension      Osteoarthritis of left hip 3/16/2022       Past Surgical History:   Procedure Laterality Date     ARTHROPLASTY HIP Left 3/16/2022    Procedure: Left total hip arthroplasty;  Surgeon: Miguel Angel Rhoades MD;  [facility]     COLONOSCOPY N/A 10/5/2020    Procedure: COLONOSCOPY, WITH POLYPECTOMY AND BIOPSY;  Surgeon: Jackie Ruiz MD;  Location: [facility]     HEMILAMINECTOMY, DISCECTOMY LUMBAR ONE LEVEL, COMBINED  12/13/2012     L4-5     ZZC CYSTOTOMY,REPAIR URETEROCELE  2015        Family History   Problem Relation Age of Onset     Other Cancer Mother      Prostate Cancer Father      Other Cancer Father         pancreatic     Prostate Cancer Paternal Grandfather      Other - See Comments Son         cleft palete       Social History     Socioeconomic History     Marital status:      Spouse name: Not on file     Number of children: Not on file     Years of education: Not on file     Highest education level: Not on file   Occupational History     Not on file   Tobacco Use     Smoking status: Never     Smokeless tobacco: Never   Vaping Use     Vaping status: Never Used   Substance and Sexual Activity     Alcohol use: No     Drug use: No     Sexual activity: Yes     Partners: Female     Birth control/protection: Female Surgical   Other Topics Concern     Parent/sibling w/ CABG, MI or angioplasty before 65F 55M? No   Social History Narrative      Not on file     Social Determinants of Health     Financial Resource Strain: Low Risk  (7/9/2024)    Financial Resource Strain      Within the past 12 months, have you or your family members you live with been unable to get utilities (heat, electricity) when it was really needed?: No   Food Insecurity: Low Risk  (7/9/2024)    Food Insecurity      Within the past 12 months, did you worry that your food would run out before you got money to buy more?: No      Within the past 12 months, did the food you bought just not last and you didn t have money to get more?: No   Transportation Needs: Low Risk  (7/9/2024)    Transportation Needs      Within the past 12 months, has lack of transportation kept you from medical appointments, getting your medicines, non-medical meetings or appointments, work, or from getting things that you need?: No   Physical Activity: Sufficiently Active (7/9/2024)    Exercise Vital Sign      Days of Exercise per Week: 6 days      Minutes of Exercise per Session: 60 min   Stress: Stress Concern Present (7/9/2024)    [employer] - Occupational Stress Questionnaire      Feeling of Stress : To some extent   Social Connections: Unknown (7/9/2024)    Social Connection and Isolation Panel [NHANES]      Frequency of Communication with Friends and Family: Not on file      Frequency of Social Gatherings with Friends and Family: More than three times a week      Attends Anabaptist Services: Not on file      Active Member of Clubs or Organizations: Not on file      Attends Club or Organization Meetings: Not on file      Marital Status: Not on file   Interpersonal Safety: Low Risk  (7/9/2024)    Interpersonal Safety      Do you feel physically and emotionally safe where you currently live?: Yes      Within the past 12 months, have you been hit, slapped, kicked or otherwise physically hurt by someone?: No      Within the past 12 months, have you been humiliated or emotionally  abused in other ways by your partner or ex-partner?: No   Housing Stability: Low Risk  (7/9/2024)    Housing Stability      Do you have housing? : Yes      Are you worried about losing your housing?: No       Outpatient Encounter Medications as of 7/17/2024   Medication Sig Dispense Refill     amoxicillin (AMOXIL) 500 MG capsule Take 4 caps (2000mg) 30-60 minutes before dental procedure 4 capsule 3     amphetamine-dextroamphetamine (ADDERALL XR) 20 MG 24 hr capsule Take 20 mg by mouth daily       atorvastatin (LIPITOR) 40 MG tablet Take 1 tablet (40 mg) by mouth daily 90 tablet 3     COMPOUNDED NON-CONTROLLED SUBSTANCE (CMPD RX) - PHARMACY TO MIX COMPOUNDED MEDICATION Fluorouracil 5% Calcipotriene 0.005% 1:1 Cream apply twice daily for 1 weeks to nose and scalp 30 g 6     lisinopril (ZESTRIL) 20 MG tablet Take 1 tablet (20 mg) by mouth daily 90 tablet 0     metroNIDAZOLE (METROCREAM) 0.75 % external cream Apply topically 2 times daily On affected area on nose, keep away from eyes and mouth wash hands after use 45 g 0     No facility-administered encounter medications on file as of 7/17/2024.             O:   NAD, WDWN, Alert & Oriented, Mood & Affect wnl, Vitals stable   General appearance normal   Vitals stable   Alert, oriented and in no acute distress     L hand stuck on papule  Face clear of actinic keratosis       Eyes: Conjunctivae/lids:Normal     ENT: Lips, mucosa: normal    MSK:Normal    Cardiovascular: peripheral edema none    Pulm: Breathing Normal    Neuro/Psych: Orientation:Alert and Orientedx3 ; Mood/Affect:normal       A/P:  Seborrheic keratosis, hx of actinic keratosis   Did well   It was a pleasure speaking to Lane Bosch today.  Previous clinic notes and pertinent laboratory tests were reviewed prior to Lane Bosch's visit.  Nature and genetics of benign skin lesions dicussed with patient.  Patient encouraged to perform monthly skin exams.  UV precautions reviewed with patient.  Return to  clinic 12 months      Again, thank you for allowing me to participate in the care of your patient.        Sincerely,        Monty Marquez MD

## 2024-08-07 ENCOUNTER — TRANSFERRED RECORDS (OUTPATIENT)
Dept: HEALTH INFORMATION MANAGEMENT | Facility: CLINIC | Age: 59
End: 2024-08-07

## 2024-08-16 ENCOUNTER — HOSPITAL ENCOUNTER (EMERGENCY)
Facility: CLINIC | Age: 59
Discharge: HOME | End: 2024-08-16
Attending: NURSE PRACTITIONER | Admitting: NURSE PRACTITIONER
Payer: COMMERCIAL

## 2024-08-16 VITALS
TEMPERATURE: 96.4 F | DIASTOLIC BLOOD PRESSURE: 73 MMHG | SYSTOLIC BLOOD PRESSURE: 135 MMHG | RESPIRATION RATE: 16 BRPM | OXYGEN SATURATION: 100 % | HEART RATE: 55 BPM

## 2024-08-16 DIAGNOSIS — N41.0 ACUTE PROSTATITIS WITHOUT HEMATURIA: ICD-10-CM

## 2024-08-16 DIAGNOSIS — N39.0 COMPLICATED UTI (URINARY TRACT INFECTION): ICD-10-CM

## 2024-08-16 LAB
ALBUMIN UR-MCNC: NEGATIVE MG/DL
APPEARANCE UR: CLEAR
BILIRUB UR QL STRIP: NEGATIVE
COLOR UR AUTO: YELLOW
GLUCOSE UR STRIP-MCNC: NEGATIVE MG/DL
HGB UR QL STRIP: NEGATIVE
KETONES UR STRIP-MCNC: NEGATIVE MG/DL
LEUKOCYTE ESTERASE UR QL STRIP: ABNORMAL
NITRATE UR QL: NEGATIVE
PH UR STRIP: 7 [PH] (ref 5–7)
RBC URINE: 0 /HPF
SP GR UR STRIP: 1 (ref 1–1.03)
UROBILINOGEN UR STRIP-MCNC: NORMAL MG/DL
WBC URINE: 37 /HPF

## 2024-08-16 PROCEDURE — 87186 SC STD MICRODIL/AGAR DIL: CPT | Performed by: NURSE PRACTITIONER

## 2024-08-16 PROCEDURE — G0463 HOSPITAL OUTPT CLINIC VISIT: HCPCS | Performed by: NURSE PRACTITIONER

## 2024-08-16 PROCEDURE — 87086 URINE CULTURE/COLONY COUNT: CPT | Performed by: NURSE PRACTITIONER

## 2024-08-16 PROCEDURE — 81001 URINALYSIS AUTO W/SCOPE: CPT | Performed by: NURSE PRACTITIONER

## 2024-08-16 PROCEDURE — 99213 OFFICE O/P EST LOW 20 MIN: CPT | Performed by: NURSE PRACTITIONER

## 2024-08-16 RX ORDER — TAMSULOSIN HYDROCHLORIDE 0.4 MG/1
0.4 CAPSULE ORAL DAILY
Qty: 14 CAPSULE | Refills: 0 | Status: SHIPPED | OUTPATIENT
Start: 2024-08-16 | End: 2024-08-22

## 2024-08-16 RX ORDER — SULFAMETHOXAZOLE/TRIMETHOPRIM 800-160 MG
1 TABLET ORAL 2 TIMES DAILY
Qty: 28 TABLET | Refills: 0 | Status: SHIPPED | OUTPATIENT
Start: 2024-08-16 | End: 2024-08-19

## 2024-08-16 ASSESSMENT — COLUMBIA-SUICIDE SEVERITY RATING SCALE - C-SSRS
2. HAVE YOU ACTUALLY HAD ANY THOUGHTS OF KILLING YOURSELF IN THE PAST MONTH?: NO
6. HAVE YOU EVER DONE ANYTHING, STARTED TO DO ANYTHING, OR PREPARED TO DO ANYTHING TO END YOUR LIFE?: NO
1. IN THE PAST MONTH, HAVE YOU WISHED YOU WERE DEAD OR WISHED YOU COULD GO TO SLEEP AND NOT WAKE UP?: NO

## 2024-08-16 ASSESSMENT — ACTIVITIES OF DAILY LIVING (ADL): ADLS_ACUITY_SCORE: 38

## 2024-08-16 NOTE — ED PROVIDER NOTES
ED Provider Note  [facility]      History   No chief complaint on file.    HPI  Lane Bosch is a 59 year old male who reports that he has had 7 days of difficulty urinating.  Denies any blood in his urine, no fever or chills reported.  No abdominal pain or flank pain reported.  Denies any previous history of difficulty urinating.  Reports that he has had kidney stones in his past.  Denies nausea, vomiting or diarrhea.            Allergies:  No Known Allergies    Problem List:    Patient Active Problem List    Diagnosis Date Noted    Osteoarthritis of left hip 03/16/2022     Priority: Medium    Benign essential hypertension 11/17/2017     Priority: Medium    Actinic keratosis 11/17/2017     Priority: Medium        Past Medical History:    Past Medical History:   Diagnosis Date    Hypertension     Osteoarthritis of left hip 3/16/2022       Past Surgical History:    Past Surgical History:   Procedure Laterality Date    ARTHROPLASTY HIP Left 3/16/2022    Procedure: Left total hip arthroplasty;  Surgeon: Miguel Angel Rhoades MD;  [facility]    COLONOSCOPY N/A 10/5/2020    Procedure: COLONOSCOPY, WITH POLYPECTOMY AND BIOPSY;  Surgeon: Jackie Ruiz MD;  Location: [facility]    HEMILAMINECTOMY, DISCECTOMY LUMBAR ONE LEVEL, COMBINED  12/13/2012     L4-5    ZZC CYSTOTOMY,REPAIR URETEROCELE  2015       Family History:    Family History   Problem Relation Age of Onset    Other Cancer Mother     Prostate Cancer Father     Other Cancer Father         pancreatic    Prostate Cancer Paternal Grandfather     Other - See Comments Son         cleft palete       Social History:  Marital Status:   [2]  Social History     Tobacco Use    Smoking status: Never    Smokeless tobacco: Never   Vaping Use    Vaping status: Never Used   Substance Use Topics    Alcohol use: No    Drug use: No        Medications:    amoxicillin (AMOXIL) 500 MG capsule  amphetamine-dextroamphetamine (ADDERALL XR) 20 MG 24 hr  capsule  atorvastatin (LIPITOR) 40 MG tablet  COMPOUNDED NON-CONTROLLED SUBSTANCE (CMPD RX) - PHARMACY TO MIX COMPOUNDED MEDICATION  lisinopril (ZESTRIL) 20 MG tablet  metroNIDAZOLE (METROCREAM) 0.75 % external cream          Review of Systems  A medically appropriate review of systems was performed with pertinent positives and negatives noted in the HPI, and all other systems negative.    Physical Exam   No data found.       Physical Exam  General: alert and in distress on arrival  Head: atraumatic, normocephalic  Lungs:  nonlabored, CTA bilateral throughout.  CV: Rate and rhythm, extremities warm and perfused, no edema in lower extremities.  Abd: nondistended, nontender, bladder distended, no CVA tenderness bilateral  : No penile lesions present, no visualized drainage from urethra.  Prostate exam: Enlarged firm nonboggy prostate approximately 90 g.  Pain with prostate exam.   Skin: no rashes, no diaphoresis and skin color normal  Neuro: Patient awake, alert, speech is fluent, no focal deficits  Psychiatric: affect/mood normal, appropriate historian      ED Course                 Procedures                    Results for orders placed or performed during the hospital encounter of 08/16/24 (from the past 24 hour(s))   UA with Microscopic reflex to Culture    Specimen: Urine, Clean Catch   Result Value Ref Range    Color Urine Yellow Colorless, Straw, Light Yellow, Yellow    Appearance Urine Clear Clear    Glucose Urine Negative Negative mg/dL    Bilirubin Urine Negative Negative    Ketones Urine Negative Negative mg/dL    Specific Gravity Urine 1.005 1.003 - 1.035    Blood Urine Negative Negative    pH Urine 7.0 5.0 - 7.0    Protein Albumin Urine Negative Negative mg/dL    Urobilinogen Urine Normal Normal, 2.0 mg/dL    Nitrite Urine Negative Negative    Leukocyte Esterase Urine Moderate (A) Negative    RBC Urine 0 <=2 /HPF    WBC Urine 37 (H) <=5 /HPF    Narrative    Urine Culture ordered based on laboratory  criteria       MEDICATIONS GIVEN IN THE EMERGENCY DEPARTMENT:  Medications - No data to display             Assessments & Plan (with Medical Decision Making)  59 year old male who presents to the Urgent Care for evaluation of difficult and painful urination, bladder distention.  UA has elevated white blood cells, elevated leukocyte Esterase, UC has been sent and is pending, patient is aware that he will be contacted with these results when they are available.  He is also aware that they may need to change or augment antibiotics he is currently taking.  Prostate exam was painful patient has nonboggy, enlarged prostate, patient was able to cath himself with verbal cueing, a catheter was left in place as there was an initial 750 mL drained from his bladder, over the next 45 minutes there was another 500 mL that drained from his bladder.  Catheter was inserted without complications using sterile technique.  Patient was given a leg bag, a large drainage bag recommended that he only sleep with a large drainage bag in a dependent position to prevent backflow into the bladder.  Bactrim is ordered twice daily for 14 days.  Recommend the patient is seen promptly by urology and/or primary care urology consult was ordered.  Patient was also provided with a central scheduling phone number if he chooses to schedule this otherwise he will be contacted by urology to be seen.  Patient advised to return if he develops fever, chills worsening symptoms advised if he returns for these he should be seen in the emergency department for further evaluation.  Differentials: Complicated UTI, acute prostatitis Bactrim is ordered twice daily for 14 days.  Patient was given tamsulosin for enlarged prostate.  Bactrim would treat complicated UTI or acute prostatitis.  Await UC results.       I have reviewed the nursing notes.    I have reviewed the findings, diagnosis, plan and need for follow up with the patient.        NEW PRESCRIPTIONS STARTED  AT TODAY'S ER VISIT  Discharge Medication List as of 8/16/2024  5:15 PM        START taking these medications    Details   sulfamethoxazole-trimethoprim (BACTRIM DS) 800-160 MG tablet Take 1 tablet by mouth 2 times daily for 14 days, Disp-28 tablet, R-0, E-Prescribe      tamsulosin (FLOMAX) 0.4 MG capsule Take 1 capsule (0.4 mg) by mouth daily for 14 days, Disp-14 capsule, R-0, E-Prescribe             Final diagnoses:   Acute prostatitis without hematuria   Complicated UTI (urinary tract infection)       8/16/2024   [facility] EMERGENCY DEPT       Jeaneth Clifton, MARCELA CNP  08/16/24 [id number]

## 2024-08-16 NOTE — ED NOTES
Discussed standard fuller bag vs leg back at home. Walked through changing bag from standard to leg bag, and patient elected to keep standard bag in place at this time. Sent home with supplies to change to leg bag, discussed infection control issues and procedures, given supplies and gloves to use. Reviewed discharge papers with patient, will [facility]  [facility].

## 2024-08-16 NOTE — DISCHARGE INSTRUCTIONS
Recommend leaving the catheter in for at minimum 3 days, if you take the catheter out yourself and you are unable to void you have a spare catheter that you can put in and the sterile techniques shown to you in urgent care today.  Recommend not sleeping with the leg back on as this can back up into your bladder.  Starting on antibiotics Bactrim twice daily for 14 days.  Please be seen in your primary clinic next week.  I will place a urology consult for you today.  They will contact you to schedule this if you choose to schedule this you can call the central scheduling phone number at [phone].

## 2024-08-19 ENCOUNTER — TELEPHONE (OUTPATIENT)
Dept: NURSING | Facility: CLINIC | Age: 59
End: 2024-08-19
Payer: COMMERCIAL

## 2024-08-19 ENCOUNTER — MYC MEDICAL ADVICE (OUTPATIENT)
Dept: FAMILY MEDICINE | Facility: CLINIC | Age: 59
End: 2024-08-19
Payer: COMMERCIAL

## 2024-08-19 LAB — BACTERIA UR CULT: ABNORMAL

## 2024-08-19 RX ORDER — CIPROFLOXACIN 500 MG/1
500 TABLET, FILM COATED ORAL 2 TIMES DAILY
Qty: 28 TABLET | Refills: 0 | Status: SHIPPED | OUTPATIENT
Start: 2024-08-19 | End: 2024-09-02

## 2024-08-19 NOTE — TELEPHONE ENCOUNTER
[facility]-Emergency Dept    Reason for call: Lab Result Notification     Lab Result (including Rx patient on, if applicable).  If culture, copy of lab report at bottom.  Lab Result: Final Urine Culture Report on 8/19/24  [facility] Emergency Dept discharge antibiotic prescribed: Sulfamethoxazole-Trimethoprim (Bactrim DS, Septra DS) 800-160 mg PO tablet,  1 tablet by mouth 2 times daily for 14 days.   Bacterial Growth: 50,000 - 100,000 CFU/ML Enterococcus faecalis   Recommendations in treatment per [facility] ED lab result Urine Culture protocol.     Creatinine Level (mg/dl)   Creatinine   Date Value Ref Range Status   07/09/2024 0.79 0.67 - 1.17 mg/dL Final   09/08/2020 0.75 0.66 - 1.25 mg/dL Final    Creatinine clearance (ml/min), if applicable    Serum creatinine: 0.79 mg/dL 07/09/24 1156  Estimated creatinine clearance: 101.8 mL/min     Patient's current Symptoms:   Discomfort with urination  Is able to urinating some now  Still gets some urgency to urinate after urinating    RN Recommendations/Instructions per [place] ED lab result protocol:   [facility] ED lab result protocol utilized: urine culture  RN will consult with ED provider.    [facility] Emergency Department Provider: Jessica ortiz PA-C  Consultation Time: 2:15PM  Provider Recommendation:  Discontinue the Bactrim DS and switch to Ciprofloxacin 500 mg PO BID for 14 days  Patient/parent notified of Providers recommendations (YES/NO): yes  .  Patient/care giver notified to contact your PCP clinic or return to the Emergency department if your:  Symptoms do not improve after 3 days on antibiotic.  Symptoms do not resolve after completing antibiotic.  Symptoms worsen or other concerning symptoms.          Cooper Merino RN

## 2024-08-22 ENCOUNTER — OFFICE VISIT (OUTPATIENT)
Dept: UROLOGY | Facility: CLINIC | Age: 59
End: 2024-08-22
Payer: COMMERCIAL

## 2024-08-22 VITALS
HEART RATE: 68 BPM | SYSTOLIC BLOOD PRESSURE: 150 MMHG | DIASTOLIC BLOOD PRESSURE: 77 MMHG | HEIGHT: 69 IN | BODY MASS INDEX: 23.85 KG/M2 | WEIGHT: 161 LBS | TEMPERATURE: 97.5 F | OXYGEN SATURATION: 99 %

## 2024-08-22 DIAGNOSIS — R39.12 BENIGN PROSTATIC HYPERPLASIA WITH WEAK URINARY STREAM: Primary | ICD-10-CM

## 2024-08-22 DIAGNOSIS — N40.1 BENIGN PROSTATIC HYPERPLASIA WITH WEAK URINARY STREAM: Primary | ICD-10-CM

## 2024-08-22 PROCEDURE — 99203 OFFICE O/P NEW LOW 30 MIN: CPT | Mod: 25 | Performed by: STUDENT IN AN ORGANIZED HEALTH CARE EDUCATION/TRAINING PROGRAM

## 2024-08-22 PROCEDURE — 51798 US URINE CAPACITY MEASURE: CPT | Performed by: STUDENT IN AN ORGANIZED HEALTH CARE EDUCATION/TRAINING PROGRAM

## 2024-08-22 RX ORDER — TAMSULOSIN HYDROCHLORIDE 0.4 MG/1
0.4 CAPSULE ORAL DAILY
Qty: 90 CAPSULE | Refills: 3 | Status: SHIPPED | OUTPATIENT
Start: 2024-08-22

## 2024-08-22 ASSESSMENT — PAIN SCALES - GENERAL: PAINLEVEL: NO PAIN (0)

## 2024-08-22 NOTE — NURSING NOTE
"Initial BP (!) 150/77   Pulse 68   Temp 97.5  F (36.4  C) (Tympanic)   Ht 1.752 m (5' 8.98\")   Wt 73 kg (161 lb)   SpO2 99%   BMI 23.79 kg/m   Estimated body mass index is 23.79 kg/m  as calculated from the following:    Height as of this encounter: 1.752 m (5' 8.98\").    Weight as of this encounter: 73 kg (161 lb). .  Active order to obtain bladder scan? Yes   Name of ordering provider:  Astrid Pretty  Bladder scan preformed post void yes.  Bladder scan reveled 21ML  Provider notified?  Yes    Sharelne MONTGOMERY CMA          "

## 2024-08-22 NOTE — PROGRESS NOTES
Chief Complaint:   LUTS         History of Present Illness:   Lane Bosch is a 59 year old male with a history of HTN who presents for evaluation of LUTS.    The patient presented to the ED on 8/16/2024 with difficulty urinating for 7 days.  UA was notable for 37 WBCs, urine culture grew Enterococcus. An indwelling catheter was replaced with 750 mL of immediate urine return and another 500 mL over the next 45 minutes. He was prescribed Bactrim initially, but was changed to a 14 day course of ciprofloxacin. He was also started on tamsulosin.     The indwelling catheter fell out about a day after he was in the ED.     In the months leading up to his visit to the ED, he did notice some weakening of his urinary stream and pain with ejaculation.          Past Medical History:     Past Medical History:   Diagnosis Date    Hypertension     Osteoarthritis of left hip 3/16/2022            Past Surgical History:     Past Surgical History:   Procedure Laterality Date    ARTHROPLASTY HIP Left 3/16/2022    Procedure: Left total hip arthroplasty;  Surgeon: Miguel Angel Rhoades MD;  Location: [facility] OR    COLONOSCOPY N/A 10/5/2020    Procedure: COLONOSCOPY, WITH POLYPECTOMY AND BIOPSY;  Surgeon: Jackie Ruiz MD;  Location: [facility]    HEMILAMINECTOMY, DISCECTOMY LUMBAR ONE LEVEL, COMBINED  12/13/2012     L4-5    ZZC CYSTOTOMY,REPAIR URETEROCELE  2015            Medications     Current Outpatient Medications   Medication Sig Dispense Refill    amoxicillin (AMOXIL) 500 MG capsule Take 4 caps (2000mg) 30-60 minutes before dental procedure 4 capsule 3    amphetamine-dextroamphetamine (ADDERALL XR) 20 MG 24 hr capsule Take 20 mg by mouth daily      atorvastatin (LIPITOR) 40 MG tablet Take 1 tablet (40 mg) by mouth daily 90 tablet 3    ciprofloxacin (CIPRO) 500 MG tablet Take 1 tablet (500 mg) by mouth 2 times daily for 14 days 28 tablet 0    COMPOUNDED NON-CONTROLLED SUBSTANCE (CMPD RX) - PHARMACY TO MIX COMPOUNDED MEDICATION  "Fluorouracil 5% Calcipotriene 0.005% 1:1 Cream apply twice daily for 1 weeks to nose and scalp 30 g 6    lisinopril (ZESTRIL) 20 MG tablet Take 1 tablet (20 mg) by mouth daily 90 tablet 0    metroNIDAZOLE (METROCREAM) 0.75 % external cream Apply topically 2 times daily On affected area on nose, keep away from eyes and mouth wash hands after use 45 g 0    tamsulosin (FLOMAX) 0.4 MG capsule Take 1 capsule (0.4 mg) by mouth daily for 14 days 14 capsule 0     No current facility-administered medications for this visit.            Allergies:   Patient has no known allergies.         Review of Systems:  From intake questionnaire   Negative 14 system review except as noted on HPI, nurse's note.         Physical Exam:   Patient is a 59 year old  male   Vitals: Blood pressure (!) 150/77, pulse 68, temperature 97.5  F (36.4  C), temperature source Tympanic, height 1.752 m (5' 8.98\"), weight 73 kg (161 lb), SpO2 99%.  General Appearance Adult: Alert, no acute distress, oriented.  Lungs: Non-labored breathing.  Heart: No obvious jugular venous distension present.  Neuro: Alert, oriented, speech and mentation normal    PVR: 21 mL      Labs and Pathology:    I personally reviewed all applicable laboratory data and went over findings with patient  Significant for:    CBC RESULTS:  Recent Labs   Lab Test 07/09/24  1156 03/18/22  0629 03/17/22  0812   WBC 6.2  --   --    HGB 13.8 10.8* 11.0*     --   --         BMP RESULTS:  Recent Labs   Lab Test 07/09/24  1156 05/18/23  1439 03/18/22  0615 03/17/22  0812 03/16/22  0549 02/22/22  0933 09/08/20  0932 11/16/18  1411 11/17/17  1426   0000    141  --   --   --  139 138 137 140  --    POTASSIUM 3.8 4.8  --   --   --  3.9 3.9 3.8 3.9  --    CHLORIDE 104 104  --   --   --  109 104 103 106  --    CO2 26 29  --   --   --  30 30 28 27  --    ANIONGAP 10 8  --   --   --  <1* 4 6 7  --    GLC 85 87 82 130*   < > 94 95 76 80   < >   BUN 15.4 13.1  --   --   --  20 11 15 23  --  "   CR 0.79 0.83  --   --   --  0.80 0.75 0.77 0.86  --    GFRESTIMATED >90 >90  --   --   --  >90 >90 >90 >90  --    GFRESTBLACK  --   --   --   --   --   --  >90 >90 >90  --    ROHITH 9.0 10.1  --   --   --  9.0 8.8 9.3 9.6  --     < > = values in this interval not displayed.       UA RESULTS:   Recent Labs   Lab Test 08/16/24  1510   SG 1.005   URINEPH 7.0   NITRITE Negative   RBCU 0   WBCU 37*       PSA RESULTS  PSA   Date Value Ref Range Status   09/08/2020 1.29 0 - 4 ug/L Final     Comment:     Assay Method:  Chemiluminescence using Siemens Vista analyzer   11/16/2018 1.76 0 - 4 ug/L Final     Comment:     Assay Method:  Chemiluminescence using Siemens Vista analyzer     Prostate Specific Antigen Screen   Date Value Ref Range Status   07/09/2024 1.48 0.00 - 4.50 ng/mL Final   02/22/2022 1.89 0.00 - 4.00 ug/L Final            Assessment and Plan:     Assessment: 59 year old male seen in evaluation for LUTS and urinary retention. The patient presented to the ED on 8/16/2024 with difficulty urinating for 7 days.  UA was notable for 37 WBCs, urine culture grew Enterococcus. An indwelling catheter was replaced with 750 mL of immediate urine return and another 500 mL over the next 45 minutes. He was prescribed Bactrim initially, but was changed to a 14 day course of ciprofloxacin. He was also started on tamsulosin.     The indwelling catheter fell out about a day after he was in the ED. In the months leading up to his visit to the ED, he did notice some weakening of his urinary stream and pain with ejaculation.     His PVR today was 21 mL. We discussed BPH as the likely cause of his urinary symptoms. We reviewed treatment options including observation, alpha blockers, 5-alpha reductase inhibitors, and bladder outlet surgery.     The patient will continue ciprofloxacin and tamsulosin for now. If he continues to note bothersome urinary symptoms in two weeks from now, he will send me a MyChart message and let me know.      Plan:  Complete ciprofloxacin as prescribed.   Continue tamsulosin 0.4 mg daily.   Follow up if symptoms are not improved.    MARÍA ELENA MARTINEZ PA-C  Department of Urology

## 2024-08-27 ENCOUNTER — TRANSFERRED RECORDS (OUTPATIENT)
Dept: HEALTH INFORMATION MANAGEMENT | Facility: CLINIC | Age: 59
End: 2024-08-27
Payer: COMMERCIAL

## 2024-09-02 ENCOUNTER — MYC MEDICAL ADVICE (OUTPATIENT)
Dept: UROLOGY | Facility: CLINIC | Age: 59
End: 2024-09-02
Payer: COMMERCIAL

## 2024-09-02 DIAGNOSIS — R31.0 GROSS HEMATURIA: Primary | ICD-10-CM

## 2024-09-03 ENCOUNTER — LAB (OUTPATIENT)
Dept: LAB | Facility: CLINIC | Age: 59
End: 2024-09-03
Payer: COMMERCIAL

## 2024-09-03 DIAGNOSIS — R31.0 GROSS HEMATURIA: ICD-10-CM

## 2024-09-03 DIAGNOSIS — N30.01 ACUTE CYSTITIS WITH HEMATURIA: Primary | ICD-10-CM

## 2024-09-03 LAB
ALBUMIN UR-MCNC: 30 MG/DL
APPEARANCE UR: ABNORMAL
BACTERIA #/AREA URNS HPF: ABNORMAL /HPF
BILIRUB UR QL STRIP: ABNORMAL
COLOR UR AUTO: ABNORMAL
GLUCOSE UR STRIP-MCNC: NEGATIVE MG/DL
HGB UR QL STRIP: ABNORMAL
KETONES UR STRIP-MCNC: NEGATIVE MG/DL
LEUKOCYTE ESTERASE UR QL STRIP: ABNORMAL
NITRATE UR QL: NEGATIVE
PH UR STRIP: 8 [PH] (ref 5–7)
RBC #/AREA URNS AUTO: >100 /HPF
SP GR UR STRIP: 1.01 (ref 1–1.03)
SQUAMOUS #/AREA URNS AUTO: ABNORMAL /LPF
UROBILINOGEN UR STRIP-ACNC: 1 E.U./DL
WBC #/AREA URNS AUTO: ABNORMAL /HPF

## 2024-09-03 PROCEDURE — 81001 URINALYSIS AUTO W/SCOPE: CPT

## 2024-09-03 PROCEDURE — 87086 URINE CULTURE/COLONY COUNT: CPT

## 2024-09-03 PROCEDURE — 88112 CYTOPATH CELL ENHANCE TECH: CPT | Performed by: PATHOLOGY

## 2024-09-03 RX ORDER — SULFAMETHOXAZOLE/TRIMETHOPRIM 800-160 MG
1 TABLET ORAL 2 TIMES DAILY
Qty: 14 TABLET | Refills: 0 | Status: SHIPPED | OUTPATIENT
Start: 2024-09-03 | End: 2024-09-04

## 2024-09-05 LAB
BACTERIA UR CULT: NO GROWTH
PATH REPORT.COMMENTS IMP SPEC: NORMAL
PATH REPORT.FINAL DX SPEC: NORMAL
PATH REPORT.GROSS SPEC: NORMAL
PATH REPORT.MICROSCOPIC SPEC OTHER STN: NORMAL
PATH REPORT.RELEVANT HX SPEC: NORMAL

## 2024-09-17 ENCOUNTER — HOSPITAL ENCOUNTER (OUTPATIENT)
Dept: CT IMAGING | Facility: CLINIC | Age: 59
Discharge: HOME | End: 2024-09-17
Attending: STUDENT IN AN ORGANIZED HEALTH CARE EDUCATION/TRAINING PROGRAM | Admitting: STUDENT IN AN ORGANIZED HEALTH CARE EDUCATION/TRAINING PROGRAM
Payer: COMMERCIAL

## 2024-09-17 DIAGNOSIS — R31.0 GROSS HEMATURIA: ICD-10-CM

## 2024-09-17 PROCEDURE — 250N000009 HC RX 250: Performed by: RADIOLOGY

## 2024-09-17 PROCEDURE — 74178 CT ABD&PLV WO CNTR FLWD CNTR: CPT

## 2024-09-17 PROCEDURE — 250N000011 HC RX IP 250 OP 636: Performed by: RADIOLOGY

## 2024-09-17 RX ORDER — IOPAMIDOL 755 MG/ML
79 INJECTION, SOLUTION INTRAVASCULAR ONCE
Status: COMPLETED | OUTPATIENT
Start: 2024-09-17 | End: 2024-09-17

## 2024-09-17 RX ADMIN — IOPAMIDOL 79 ML: 755 INJECTION, SOLUTION INTRAVENOUS at 14:59

## 2024-09-17 RX ADMIN — SODIUM CHLORIDE 100 ML: 9 INJECTION, SOLUTION INTRAVENOUS at 14:59

## 2024-09-29 NOTE — PROGRESS NOTES
CYSTOSCOPY      PREOPERATIVE DIAGNOSIS:  Gross hematuria    POSTOPERATIVE DIAGNOSIS:  same    ATTENDING SURGEONS:  Dr. Kuhn       ANESTHESIA:  Xylocaine jelly    PREPARATION:  Betadine    INDICATIONS FOR PROCEDURE:  This 59 year old male patient presents with gross hematuria .  Has history of UTI with urinary retention on Tamsulosin only.   The procedure, indications, risks and alternatives were discussed.  The patient wished to proceed.  Cytology Negative for High Grade Urothelial Carcinoma                 Other Findings:                  Numerous crystals present.    CTU:   1.  Layering high density in the posterior/dependent portion of the  urinary bladder, suggestive of blood products in this patient with  history of gross hematuria.  2.  Punctate nonobstructing left intrarenal calculi. No  hydronephrosis.  3.  Simple appearing left renal cysts. No follow-up is necessary.  4.  Prostatomegaly.    PROCEDURE AND FINDINGS:  A time-out was completed verifying correct patient, procedure, site, positioning and implant(s) or special equipment.    After informed consent was obtained, the patient was prepped and draped in the usual manner in the supine frog legged position.  Cystoscopy was carried out using the flexible cystoscope.    The urethra was normal.  The prostatic urethra was normal.   The ureteral orifices were normal in position and configuration and effluxing clear urine.  Prostate with trilobar hypertrophy.  IML noted on retroflexion.  Bladder with erythema and varicosities.  Small amounts of layered debris consistent with resolving clot/debris from incomplete emptying.  Small area in the dome erythematous textured though not papillary appearance.     ESTIMATED BLOOD LOSS:  None    COMPLICATIONS:  None    IMPRESSION:   Gross Hematuria   BPH  - Plan for cysto bladder biopsy of the erythematous area of the dome   - BPH, interested in surgical options. Refer to Dr. Cintron for HoLEP discussion   - Discussed  risks and benefits of cysto bladder biopsy, he understands and would like to proceed     Refugio Kuhn MD, MS  Department of Urology  Infertility, Sexual Medicine, Reconstruction  [facility]

## 2024-09-30 ENCOUNTER — TELEPHONE (OUTPATIENT)
Dept: UROLOGY | Facility: CLINIC | Age: 59
End: 2024-09-30

## 2024-09-30 ENCOUNTER — HOSPITAL ENCOUNTER (OUTPATIENT)
Facility: AMBULATORY SURGERY CENTER | Age: 59
End: 2024-09-30
Attending: STUDENT IN AN ORGANIZED HEALTH CARE EDUCATION/TRAINING PROGRAM | Admitting: STUDENT IN AN ORGANIZED HEALTH CARE EDUCATION/TRAINING PROGRAM
Payer: COMMERCIAL

## 2024-09-30 ENCOUNTER — OFFICE VISIT (OUTPATIENT)
Dept: UROLOGY | Facility: CLINIC | Age: 59
End: 2024-09-30
Payer: COMMERCIAL

## 2024-09-30 VITALS
SYSTOLIC BLOOD PRESSURE: 137 MMHG | OXYGEN SATURATION: 100 % | WEIGHT: 161 LBS | BODY MASS INDEX: 23.85 KG/M2 | DIASTOLIC BLOOD PRESSURE: 75 MMHG | HEART RATE: 60 BPM | HEIGHT: 69 IN

## 2024-09-30 DIAGNOSIS — R31.0 GROSS HEMATURIA: Primary | ICD-10-CM

## 2024-09-30 PROCEDURE — 52000 CYSTOURETHROSCOPY: CPT | Performed by: STUDENT IN AN ORGANIZED HEALTH CARE EDUCATION/TRAINING PROGRAM

## 2024-09-30 ASSESSMENT — PAIN SCALES - GENERAL: PAINLEVEL: NO PAIN (0)

## 2024-10-01 ENCOUNTER — TELEPHONE (OUTPATIENT)
Dept: UROLOGY | Facility: CLINIC | Age: 59
End: 2024-10-01
Payer: COMMERCIAL

## 2024-10-01 ENCOUNTER — HOSPITAL ENCOUNTER (OUTPATIENT)
Facility: AMBULATORY SURGERY CENTER | Age: 59
End: 2024-10-01
Attending: STUDENT IN AN ORGANIZED HEALTH CARE EDUCATION/TRAINING PROGRAM
Payer: COMMERCIAL

## 2024-10-01 PROBLEM — R31.0 GROSS HEMATURIA: Status: ACTIVE | Noted: 2024-09-30

## 2024-10-15 ENCOUNTER — MYC REFILL (OUTPATIENT)
Dept: FAMILY MEDICINE | Facility: CLINIC | Age: 59
End: 2024-10-15
Payer: COMMERCIAL

## 2024-10-15 DIAGNOSIS — I10 BENIGN ESSENTIAL HYPERTENSION: ICD-10-CM

## 2024-10-16 RX ORDER — LISINOPRIL 20 MG/1
20 TABLET ORAL DAILY
Qty: 90 TABLET | Refills: 0 | Status: SHIPPED | OUTPATIENT
Start: 2024-10-16

## 2024-10-26 ENCOUNTER — APPOINTMENT (OUTPATIENT)
Dept: CT IMAGING | Facility: CLINIC | Age: 59
End: 2024-10-26
Attending: FAMILY MEDICINE
Payer: COMMERCIAL

## 2024-10-26 ENCOUNTER — HOSPITAL ENCOUNTER (EMERGENCY)
Facility: CLINIC | Age: 59
Discharge: HOME | End: 2024-10-26
Attending: FAMILY MEDICINE | Admitting: FAMILY MEDICINE
Payer: COMMERCIAL

## 2024-10-26 VITALS
HEART RATE: 73 BPM | HEIGHT: 69 IN | TEMPERATURE: 98.7 F | SYSTOLIC BLOOD PRESSURE: 118 MMHG | BODY MASS INDEX: 23.78 KG/M2 | DIASTOLIC BLOOD PRESSURE: 58 MMHG | RESPIRATION RATE: 18 BRPM | OXYGEN SATURATION: 98 %

## 2024-10-26 DIAGNOSIS — K57.32 SIGMOID DIVERTICULITIS: ICD-10-CM

## 2024-10-26 LAB
ALBUMIN SERPL BCG-MCNC: 3.9 G/DL (ref 3.5–5.2)
ALBUMIN UR-MCNC: NEGATIVE MG/DL
ALP SERPL-CCNC: 68 U/L (ref 40–150)
ALT SERPL W P-5'-P-CCNC: 17 U/L (ref 0–70)
ANION GAP SERPL CALCULATED.3IONS-SCNC: 9 MMOL/L (ref 7–15)
APPEARANCE UR: ABNORMAL
AST SERPL W P-5'-P-CCNC: 16 U/L (ref 0–45)
BASOPHILS # BLD AUTO: 0 10E3/UL (ref 0–0.2)
BASOPHILS NFR BLD AUTO: 0 %
BILIRUB SERPL-MCNC: 1.1 MG/DL
BILIRUB UR QL STRIP: NEGATIVE
BUN SERPL-MCNC: 17.1 MG/DL (ref 8–23)
CALCIUM SERPL-MCNC: 9.2 MG/DL (ref 8.8–10.4)
CHLORIDE SERPL-SCNC: 99 MMOL/L (ref 98–107)
COLOR UR AUTO: YELLOW
CREAT SERPL-MCNC: 0.79 MG/DL (ref 0.67–1.17)
EGFRCR SERPLBLD CKD-EPI 2021: >90 ML/MIN/1.73M2
EOSINOPHIL # BLD AUTO: 0 10E3/UL (ref 0–0.7)
EOSINOPHIL NFR BLD AUTO: 0 %
ERYTHROCYTE [DISTWIDTH] IN BLOOD BY AUTOMATED COUNT: 12 % (ref 10–15)
GLUCOSE SERPL-MCNC: 116 MG/DL (ref 70–99)
GLUCOSE UR STRIP-MCNC: NEGATIVE MG/DL
HCO3 SERPL-SCNC: 27 MMOL/L (ref 22–29)
HCT VFR BLD AUTO: 37.4 % (ref 40–53)
HGB BLD-MCNC: 13 G/DL (ref 13.3–17.7)
HGB UR QL STRIP: ABNORMAL
IMM GRANULOCYTES # BLD: 0.1 10E3/UL
IMM GRANULOCYTES NFR BLD: 1 %
KETONES UR STRIP-MCNC: 5 MG/DL
LEUKOCYTE ESTERASE UR QL STRIP: NEGATIVE
LIPASE SERPL-CCNC: 12 U/L (ref 13–60)
LYMPHOCYTES # BLD AUTO: 1 10E3/UL (ref 0.8–5.3)
LYMPHOCYTES NFR BLD AUTO: 5 %
MCH RBC QN AUTO: 30 PG (ref 26.5–33)
MCHC RBC AUTO-ENTMCNC: 34.8 G/DL (ref 31.5–36.5)
MCV RBC AUTO: 86 FL (ref 78–100)
MONOCYTES # BLD AUTO: 1.6 10E3/UL (ref 0–1.3)
MONOCYTES NFR BLD AUTO: 8 %
MUCOUS THREADS #/AREA URNS LPF: PRESENT /LPF
NEUTROPHILS # BLD AUTO: 16.7 10E3/UL (ref 1.6–8.3)
NEUTROPHILS NFR BLD AUTO: 86 %
NITRATE UR QL: NEGATIVE
NRBC # BLD AUTO: 0 10E3/UL
NRBC BLD AUTO-RTO: 0 /100
PH UR STRIP: 7 [PH] (ref 5–7)
PLAT MORPH BLD: NORMAL
PLATELET # BLD AUTO: 248 10E3/UL (ref 150–450)
POTASSIUM SERPL-SCNC: 4 MMOL/L (ref 3.4–5.3)
PROT SERPL-MCNC: 6.7 G/DL (ref 6.4–8.3)
RBC # BLD AUTO: 4.34 10E6/UL (ref 4.4–5.9)
RBC MORPH BLD: NORMAL
RBC URINE: 7 /HPF
SODIUM SERPL-SCNC: 135 MMOL/L (ref 135–145)
SP GR UR STRIP: 1.03 (ref 1–1.03)
UROBILINOGEN UR STRIP-MCNC: 2 MG/DL
WBC # BLD AUTO: 19.4 10E3/UL (ref 4–11)
WBC CLUMPS #/AREA URNS HPF: PRESENT /HPF
WBC URINE: 7 /HPF
YEAST #/AREA URNS HPF: ABNORMAL /HPF

## 2024-10-26 PROCEDURE — 250N000011 HC RX IP 250 OP 636: Performed by: FAMILY MEDICINE

## 2024-10-26 PROCEDURE — 36415 COLL VENOUS BLD VENIPUNCTURE: CPT | Performed by: FAMILY MEDICINE

## 2024-10-26 PROCEDURE — 99285 EMERGENCY DEPT VISIT HI MDM: CPT | Mod: 25 | Performed by: FAMILY MEDICINE

## 2024-10-26 PROCEDURE — 80053 COMPREHEN METABOLIC PANEL: CPT | Performed by: FAMILY MEDICINE

## 2024-10-26 PROCEDURE — 96374 THER/PROPH/DIAG INJ IV PUSH: CPT | Mod: 59 | Performed by: FAMILY MEDICINE

## 2024-10-26 PROCEDURE — 250N000013 HC RX MED GY IP 250 OP 250 PS 637: Performed by: FAMILY MEDICINE

## 2024-10-26 PROCEDURE — 81001 URINALYSIS AUTO W/SCOPE: CPT | Performed by: FAMILY MEDICINE

## 2024-10-26 PROCEDURE — 250N000009 HC RX 250: Performed by: FAMILY MEDICINE

## 2024-10-26 PROCEDURE — 85004 AUTOMATED DIFF WBC COUNT: CPT | Performed by: FAMILY MEDICINE

## 2024-10-26 PROCEDURE — 83690 ASSAY OF LIPASE: CPT | Performed by: FAMILY MEDICINE

## 2024-10-26 PROCEDURE — 99284 EMERGENCY DEPT VISIT MOD MDM: CPT | Performed by: FAMILY MEDICINE

## 2024-10-26 PROCEDURE — 74177 CT ABD & PELVIS W/CONTRAST: CPT

## 2024-10-26 RX ORDER — KETOROLAC TROMETHAMINE 15 MG/ML
15 INJECTION, SOLUTION INTRAMUSCULAR; INTRAVENOUS ONCE
Status: COMPLETED | OUTPATIENT
Start: 2024-10-26 | End: 2024-10-26

## 2024-10-26 RX ORDER — CIPROFLOXACIN 500 MG/1
500 TABLET, FILM COATED ORAL ONCE
Status: COMPLETED | OUTPATIENT
Start: 2024-10-26 | End: 2024-10-26

## 2024-10-26 RX ORDER — METRONIDAZOLE 500 MG/1
500 TABLET ORAL ONCE
Status: COMPLETED | OUTPATIENT
Start: 2024-10-26 | End: 2024-10-26

## 2024-10-26 RX ORDER — METRONIDAZOLE 500 MG/1
500 TABLET ORAL 3 TIMES DAILY
Qty: 30 TABLET | Refills: 0 | Status: SHIPPED | OUTPATIENT
Start: 2024-10-26 | End: 2024-11-05

## 2024-10-26 RX ORDER — IOPAMIDOL 755 MG/ML
79 INJECTION, SOLUTION INTRAVASCULAR ONCE
Status: COMPLETED | OUTPATIENT
Start: 2024-10-26 | End: 2024-10-26

## 2024-10-26 RX ORDER — CIPROFLOXACIN 500 MG/1
500 TABLET, FILM COATED ORAL 2 TIMES DAILY
Qty: 20 TABLET | Refills: 0 | Status: SHIPPED | OUTPATIENT
Start: 2024-10-26 | End: 2024-11-05

## 2024-10-26 RX ADMIN — CIPROFLOXACIN HYDROCHLORIDE 500 MG: 500 TABLET, FILM COATED ORAL at 22:21

## 2024-10-26 RX ADMIN — METRONIDAZOLE 500 MG: 500 TABLET ORAL at 22:21

## 2024-10-26 RX ADMIN — SODIUM CHLORIDE 60 ML: 9 INJECTION, SOLUTION INTRAVENOUS at 21:33

## 2024-10-26 RX ADMIN — IOPAMIDOL 79 ML: 755 INJECTION, SOLUTION INTRAVENOUS at 21:33

## 2024-10-26 RX ADMIN — KETOROLAC TROMETHAMINE 15 MG: 15 INJECTION, SOLUTION INTRAMUSCULAR; INTRAVENOUS at 22:19

## 2024-10-26 ASSESSMENT — COLUMBIA-SUICIDE SEVERITY RATING SCALE - C-SSRS
1. IN THE PAST MONTH, HAVE YOU WISHED YOU WERE DEAD OR WISHED YOU COULD GO TO SLEEP AND NOT WAKE UP?: NO
6. HAVE YOU EVER DONE ANYTHING, STARTED TO DO ANYTHING, OR PREPARED TO DO ANYTHING TO END YOUR LIFE?: NO
2. HAVE YOU ACTUALLY HAD ANY THOUGHTS OF KILLING YOURSELF IN THE PAST MONTH?: NO

## 2024-10-26 ASSESSMENT — ACTIVITIES OF DAILY LIVING (ADL)
ADLS_ACUITY_SCORE: 0

## 2024-10-26 NOTE — ED TRIAGE NOTES
LLQ abdominal pain started yesterday     Triage Assessment (Adult)       Row Name 10/26/24 5659          Triage Assessment    Airway WDL WDL        Respiratory WDL    Respiratory WDL WDL        Peripheral/Neurovascular WDL    Peripheral Neurovascular WDL WDL

## 2024-10-27 NOTE — DISCHARGE INSTRUCTIONS
Take ciprofloxacin 500 mg twice daily for 10 days.  Take metronidazole 500 mg 3 times daily for 10 days.  Return to be seen if you are not improved in 48 hours or have new or worsening symptoms at any time.  When you have completely recovered talk to your primary care physician about repeat colonoscopy to ensure no more worrisome process being hidden by the inflammatory changes on the CT.

## 2024-10-27 NOTE — ED PROVIDER NOTES
History     Chief Complaint   Patient presents with    Abdominal Pain     HPI    Lane Bosch is a 59 year old male who comes in with left lower quadrant abdominal pain that began yesterday.  He feels nauseated.  Yesterday he vomited.  He has not had fevers or chills.  He has been anorexic today.  He only had a couple bowls of cereal to eat.  He has not had this pain previously.  He is never had surgery on his abdomen.  He had a colonoscopy in 2020 where there is no mention of diverticulosis and he is never had diverticulitis that he is aware of.  He has been told he had stones in his kidney in the past but has never had symptomatic renal colic.  He is not short of breath and is not having chest pain.  He is not having any irritative voiding symptoms.    Allergies:  No Known Allergies    Problem List:    Patient Active Problem List    Diagnosis Date Noted    Gross hematuria 09/30/2024     Priority: Medium    Osteoarthritis of left hip 03/16/2022     Priority: Medium    Benign essential hypertension 11/17/2017     Priority: Medium    Actinic keratosis 11/17/2017     Priority: Medium        Past Medical History:    Past Medical History:   Diagnosis Date    Hypertension     Osteoarthritis of left hip 3/16/2022       Past Surgical History:    Past Surgical History:   Procedure Laterality Date    ARTHROPLASTY HIP Left 3/16/2022    Procedure: Left total hip arthroplasty;  Surgeon: Miguel Angel Rhoades MD;  Location: [facility]    COLONOSCOPY N/A 10/5/2020    Procedure: COLONOSCOPY, WITH POLYPECTOMY AND BIOPSY;  Surgeon: Jackie Ruiz MD;  Location: [facility]    HEMILAMINECTOMY, DISCECTOMY LUMBAR ONE LEVEL, COMBINED  12/13/2012     L4-5    ZZC CYSTOTOMY,REPAIR URETEROCELE  2015       Family History:    Family History   Problem Relation Age of Onset    Other Cancer Mother     Prostate Cancer Father     Other Cancer Father         pancreatic    Prostate Cancer Paternal Grandfather     Other - See Comments Son         cleft palete  "      Social History:  Marital Status:   [2]  Social History     Tobacco Use    Smoking status: Never    Smokeless tobacco: Never   Vaping Use    Vaping status: Never Used   Substance Use Topics    Alcohol use: No    Drug use: No        Medications:    ciprofloxacin (CIPRO) 500 MG tablet  metroNIDAZOLE (FLAGYL) 500 MG tablet  amoxicillin (AMOXIL) 500 MG capsule  amphetamine-dextroamphetamine (ADDERALL XR) 20 MG 24 hr capsule  atorvastatin (LIPITOR) 40 MG tablet  COMPOUNDED NON-CONTROLLED SUBSTANCE (CMPD RX) - PHARMACY TO MIX COMPOUNDED MEDICATION  lisinopril (ZESTRIL) 20 MG tablet  metroNIDAZOLE (METROCREAM) 0.75 % external cream  Multiple Vitamin (MULTIVITAMIN ADULT PO)  sulfamethoxazole-trimethoprim (BACTRIM DS) 800-160 MG tablet  tamsulosin (FLOMAX) 0.4 MG capsule          Review of Systems  All other systems are reviewed and are negative    Physical Exam   BP: 113/60  Pulse: 75  Temp: 98.7  F (37.1  C)  Resp: 16  Height: 175.3 cm (5' 9\")  SpO2: 99 %      Physical Exam    Nursing note and vitals were reviewed.  Constitutional: Awake and alert, adequately nourished and developed appearing 59-year-old in mild discomfort, who does not appear acutely ill, and who answers questions appropriately and cooperates with examination.  HEENT: Speech is fluent.  Voice quality is normal.  PERRL.  EOMI.   Neck: Freely mobile.  Cardiovascular: Cardiac examination reveals normal heart rate and regular rhythm without murmur.  Pulmonary/Chest: Breathing is unlabored.  Breath sounds are clear and equal bilaterally.  There no retractions, tachypnea, rales, wheezes, or rhonchi.  Abdomen: Soft, severely tender in the left lower quadrant with localized peritoneal signs and some referred tenderness from the right to the left lower quadrant, no HSM or masses rebound or guarding.  No CVA tenderness to percussion.  Musculoskeletal: Extremities are warm and well-perfused and without edema  Neurological: Alert, oriented, thought " content logical, coherent   Skin: Warm, dry, no rashes.  Psychiatric: Affect broad and appropriate.    ED Course        Procedures              Critical Care time:  none              Results for orders placed or performed during the hospital encounter of 10/26/24 (from the past 24 hours)   Comprehensive metabolic panel   Result Value Ref Range    Sodium 135 135 - 145 mmol/L    Potassium 4.0 3.4 - 5.3 mmol/L    Carbon Dioxide (CO2) 27 22 - 29 mmol/L    Anion Gap 9 7 - 15 mmol/L    Urea Nitrogen 17.1 8.0 - 23.0 mg/dL    Creatinine 0.79 0.67 - 1.17 mg/dL    GFR Estimate >90 >60 mL/min/1.73m2    Calcium 9.2 8.8 - 10.4 mg/dL    Chloride 99 98 - 107 mmol/L    Glucose 116 (H) 70 - 99 mg/dL    Alkaline Phosphatase 68 40 - 150 U/L    AST 16 0 - 45 U/L    ALT 17 0 - 70 U/L    Protein Total 6.7 6.4 - 8.3 g/dL    Albumin 3.9 3.5 - 5.2 g/dL    Bilirubin Total 1.1 <=1.2 mg/dL   CBC with platelets, differential    Narrative    The following orders were created for panel order CBC with platelets, differential.  Procedure                               Abnormality         Status                     ---------                               -----------         ------                     CBC with platelets and d...[[id number]]  Abnormal            Final result               RBC and Platelet Morphology[[id number]]                      Final result                 Please view results for these tests on the individual orders.   CBC with platelets and differential   Result Value Ref Range    WBC Count 19.4 (H) 4.0 - 11.0 10e3/uL    RBC Count 4.34 (L) 4.40 - 5.90 10e6/uL    Hemoglobin 13.0 (L) 13.3 - 17.7 g/dL    Hematocrit 37.4 (L) 40.0 - 53.0 %    MCV 86 78 - 100 fL    MCH 30.0 26.5 - 33.0 pg    MCHC 34.8 31.5 - 36.5 g/dL    RDW 12.0 10.0 - 15.0 %    Platelet Count 248 150 - 450 10e3/uL    % Neutrophils 86 %    % Lymphocytes 5 %    % Monocytes 8 %    % Eosinophils 0 %    % Basophils 0 %    % Immature Granulocytes 1 %    NRBCs per 100 WBC 0 <1  /100    Absolute Neutrophils 16.7 (H) 1.6 - 8.3 10e3/uL    Absolute Lymphocytes 1.0 0.8 - 5.3 10e3/uL    Absolute Monocytes 1.6 (H) 0.0 - 1.3 10e3/uL    Absolute Eosinophils 0.0 0.0 - 0.7 10e3/uL    Absolute Basophils 0.0 0.0 - 0.2 10e3/uL    Absolute Immature Granulocytes 0.1 <=0.4 10e3/uL    Absolute NRBCs 0.0 10e3/uL   RBC and Platelet Morphology   Result Value Ref Range    RBC Morphology Confirmed RBC Indices     Platelet Assessment  Automated Count Confirmed. Platelet morphology is normal.     Automated Count Confirmed. Platelet morphology is normal.   Lipase   Result Value Ref Range    Lipase 12 (L) 13 - 60 U/L   CT Abdomen Pelvis w Contrast    Narrative    EXAM: CT ABDOMEN PELVIS W CONTRAST  LOCATION: [facility]  DATE: 10/26/2024    INDICATION: Left lower quadrant abdominal pain.  COMPARISON: CT abdomen and pelvis 9/17/2024.  TECHNIQUE: CT scan of the abdomen and pelvis was performed following injection of IV contrast. Multiplanar reformats were obtained. Dose reduction techniques were used.  CONTRAST: 79 mL Isovue 370.    FINDINGS:   LOWER CHEST: Normal.    HEPATOBILIARY: Normal.    PANCREAS: Normal.    SPLEEN: Normal.    ADRENAL GLANDS: Normal.    KIDNEYS/BLADDER: Small parenchymal and left parapelvic renal cysts require no further follow-up. Small 2 mm nonobstructing kidney stones, one on the right and three on the left.    BOWEL: Focal colonic wall thickening mid sigmoid colon with pericolonic inflammatory stranding has developed since 9/17/2024 suggesting acute diverticulitis. No abscess and no free intraperitoneal air. Trace fluid is seen adjacent to the sigmoid colon   but no discrete abscess. No free intraperitoneal air. No colonic obstruction. Appendix normal. Nothing for enteritis.    LYMPH NODES: Normal.    VASCULATURE: Normal caliber abdominal aorta.    PELVIC ORGANS: Prostatic enlargement. Linear calcifications along the posterior dependent bladder may be due to small  stones.    MUSCULOSKELETAL: Degenerative change thoracolumbar spine. Left hip arthroplasty. No suspicious osseous lesion.      Impression    IMPRESSION:   1.  Focal colonic wall thickening with pericolonic inflammatory stranding mid sigmoid colon suggesting acute diverticulitis new since 9/17/2024. Trace adjacent pericolonic fluid but no discrete abscess. Recommend close follow-up to assure resolution of   these findings given the extent of the inflammatory change.  2.  Bilateral nonobstructing kidney stones with probable layering small bladder stones. Prostatic enlargement.    NOTE: ABNORMAL REPORT    THE DICTATION ABOVE DESCRIBES AN ABNORMALITY FOR WHICH FOLLOW-UP IS NEEDED.    UA with Microscopic reflex to Culture    Specimen: Urine, Clean Catch   Result Value Ref Range    Color Urine Yellow Colorless, Straw, Light Yellow, Yellow    Appearance Urine Cloudy (A) Clear    Glucose Urine Negative Negative mg/dL    Bilirubin Urine Negative Negative    Ketones Urine 5 (A) Negative mg/dL    Specific Gravity Urine 1.028 1.003 - 1.035    Blood Urine Small (A) Negative    pH Urine 7.0 5.0 - 7.0    Protein Albumin Urine Negative Negative mg/dL    Urobilinogen Urine 2.0 Normal, 2.0 mg/dL    Nitrite Urine Negative Negative    Leukocyte Esterase Urine Negative Negative    WBC Clumps Urine Present (A) None Seen /HPF    Budding Yeast Urine Few (A) None Seen /HPF    Mucus Urine Present (A) None Seen /LPF    RBC Urine 7 (H) <=2 /HPF    WBC Urine 7 (H) <=5 /HPF    Narrative    Urine Culture not indicated       Medications   metroNIDAZOLE (FLAGYL) tablet 500 mg (has no administration in time range)   ciprofloxacin (CIPRO) tablet 500 mg (has no administration in time range)   ketorolac (TORADOL) injection 15 mg (has no administration in time range)   CT Saline (60 mLs Intravenous $Given 10/26/24 2133)   iopamidol (ISOVUE-370) solution 79 mL (79 mLs Intravenous $Given 10/26/24 2133)       Assessments & Plan (with Medical Decision  Making)     59-year-old male presented with left lower quadrant abdominal pain that began yesterday as described above.  He had localized tenderness with localized peritoneal signs but no generalized peritonitis.  He had normal vital signs.  His white count was markedly elevated at 19,000.  He underwent CT scan of the abdomen pelvis to assess for cause of this and sigmoid diverticulitis was identified without free rupture and without abscess.  I discussed the findings with him.  We discussed treatment options.  He is currently taking amoxicillin for a root canal and given this we are going to treat him with ciprofloxacin and metronidazole as he may be resistant to the Augmentin.  If he is not improved in 48 hours he needs to return for reevaluation or if he is worsening at any time.  He did have a colonoscopy 4 years ago.  Would consider repeating that after successful treatment to ensure no more ominous process is being obscured by the inflammation on CT.  He and his wife expressed understanding and their questions were answered.    I have reviewed the nursing notes.    I have reviewed the findings, diagnosis, plan and need for follow up with the patient.         New Prescriptions    CIPROFLOXACIN (CIPRO) 500 MG TABLET    Take 1 tablet (500 mg) by mouth 2 times daily for 10 days.    METRONIDAZOLE (FLAGYL) 500 MG TABLET    Take 1 tablet (500 mg) by mouth 3 times daily for 10 days.       Final diagnoses:   Sigmoid diverticulitis       10/26/2024   [facility] EMERGENCY DEPT       Hosea Lopez MD  10/26/24 2876

## 2025-01-30 ENCOUNTER — MYC REFILL (OUTPATIENT)
Dept: FAMILY MEDICINE | Facility: CLINIC | Age: 60
End: 2025-01-30
Payer: COMMERCIAL

## 2025-01-30 DIAGNOSIS — I10 BENIGN ESSENTIAL HYPERTENSION: ICD-10-CM

## 2025-01-30 RX ORDER — LISINOPRIL 20 MG/1
20 TABLET ORAL DAILY
Qty: 90 TABLET | Refills: 0 | Status: SHIPPED | OUTPATIENT
Start: 2025-01-30

## 2025-04-22 DIAGNOSIS — I10 BENIGN ESSENTIAL HYPERTENSION: ICD-10-CM

## 2025-04-22 RX ORDER — LISINOPRIL 20 MG/1
20 TABLET ORAL DAILY
Qty: 90 TABLET | Refills: 0 | Status: SHIPPED | OUTPATIENT
Start: 2025-04-22

## 2025-06-09 ENCOUNTER — PATIENT OUTREACH (OUTPATIENT)
Dept: CARE COORDINATION | Facility: CLINIC | Age: 60
End: 2025-06-09
Payer: COMMERCIAL

## 2025-06-23 ENCOUNTER — PATIENT OUTREACH (OUTPATIENT)
Dept: CARE COORDINATION | Facility: CLINIC | Age: 60
End: 2025-06-23
Payer: COMMERCIAL

## 2025-07-23 DIAGNOSIS — I10 BENIGN ESSENTIAL HYPERTENSION: ICD-10-CM

## 2025-07-23 RX ORDER — LISINOPRIL 20 MG/1
20 TABLET ORAL DAILY
Qty: 90 TABLET | Refills: 0 | Status: SHIPPED | OUTPATIENT
Start: 2025-07-23

## 2025-08-09 ENCOUNTER — HEALTH MAINTENANCE LETTER (OUTPATIENT)
Age: 60
End: 2025-08-09

## 2025-08-25 ENCOUNTER — MYC REFILL (OUTPATIENT)
Dept: UROLOGY | Facility: CLINIC | Age: 60
End: 2025-08-25
Payer: COMMERCIAL

## 2025-08-25 DIAGNOSIS — N40.1 BENIGN PROSTATIC HYPERPLASIA WITH WEAK URINARY STREAM: ICD-10-CM

## 2025-08-25 DIAGNOSIS — R39.12 BENIGN PROSTATIC HYPERPLASIA WITH WEAK URINARY STREAM: ICD-10-CM

## 2025-08-25 RX ORDER — TAMSULOSIN HYDROCHLORIDE 0.4 MG/1
0.4 CAPSULE ORAL DAILY
Qty: 90 CAPSULE | Refills: 0 | Status: SHIPPED | OUTPATIENT
Start: 2025-08-25

## (undated) DEVICE — LINEN MAYO STAND COVER OVERSIZE PACK 5458

## (undated) DEVICE — GLOVE PROTEXIS POWDER FREE 7.5 ORTHOPEDIC 2D73ET75

## (undated) DEVICE — ESU GROUND PAD ADULT W/CORD E7507

## (undated) DEVICE — SOL NACL 0.9% IRRIG 1000ML BOTTLE 2F7124

## (undated) DEVICE — SUCTION MANIFOLD NEPTUNE 2 SYS 4 PORT 0702-020-000

## (undated) DEVICE — SU VICRYL 0 CT 36" J358H

## (undated) DEVICE — SU VICRYL 2-0 CT-1 27" UND J259H

## (undated) DEVICE — DRAPE STERI TOWEL LG 1010

## (undated) DEVICE — SOL NACL 0.9% INJ 1000ML BAG 2B1324X

## (undated) DEVICE — DEVICE RETRIEVER HEWSON 71111579

## (undated) DEVICE — GLOVE PROTEXIS W/NEU-THERA 8.0  2D73TE80

## (undated) DEVICE — PREP CHLORAPREP 26ML TINTED HI-LITE ORANGE 930815

## (undated) DEVICE — STRAP STIRRUP W/SLIP 30187-030

## (undated) DEVICE — PACK TOTAL HIP W/POUCH RIVERSIDE LATEX FREE

## (undated) DEVICE — GOWN IMPERVIOUS SPECIALTY XLG/XLONG 32474

## (undated) DEVICE — BLADE SAW RECIP STRK 70X6X0.025MM 0277-096-250S5

## (undated) DEVICE — DRSG TEGADERM 4X10" 1627

## (undated) DEVICE — POSITIONER ABDUCTION PILLOW FOAM MED FP-ABDUCTM

## (undated) DEVICE — SUCTION IRR SYSTEM W/O TIP INTERPULSE HANDPIECE 0210-100-000

## (undated) DEVICE — SPONGE LAP 18X18" X8435

## (undated) DEVICE — ESU PENCIL W/SMOKE EVAC NEPTUNE STRYKER 0703-046-000

## (undated) DEVICE — LINEN TOWEL PACK X5 5464

## (undated) DEVICE — GLOVE PROTEXIS W/NEU-THERA 6.0  2D73TE60

## (undated) DEVICE — ADH SKIN CLOSURE PREMIERPRO EXOFIN 1.0ML 3470

## (undated) DEVICE — STRAP KNEE/BODY 31143004

## (undated) DEVICE — SOL WATER IRRIG 1000ML BOTTLE 2F7114

## (undated) DEVICE — BLADE KNIFE SURG 20 371120

## (undated) DEVICE — HOOD FLYTE W/PEELAWAY 408-800-100

## (undated) DEVICE — SU ETHIBOND 5 V-37 4X30" MB66G

## (undated) DEVICE — GLOVE PROTEXIS BLUE W/NEU-THERA 6.5  2D73EB65

## (undated) DEVICE — NDL SPINAL 18GA 3.5" 405184

## (undated) DEVICE — GLOVE PROTEXIS BLUE W/NEU-THERA 8.0  2D73EB80

## (undated) DEVICE — GOWN XLG DISP 9545

## (undated) DEVICE — DRAPE SLEEVE 599

## (undated) DEVICE — DRSG TEGADERM ALGINATE AG 4X5" 90303

## (undated) DEVICE — SU MONOCRYL 3-0 PS-1 27" Y936H

## (undated) DEVICE — BONE CLEANING TIP INTERPULSE  0210-010-000

## (undated) DEVICE — DRSG KERLIX 4 1/2"X4YDS ROLL 6730

## (undated) DEVICE — LINEN ORTHO PACK 5446

## (undated) DEVICE — DRILL BIT FLEXIBLE REFLECTION 35MM 71362935

## (undated) DEVICE — DRAPE IOBAN INCISE 36X23" 6651EZ

## (undated) DEVICE — DRSG STERI STRIP 1/2X4" R1547

## (undated) DEVICE — DECANTER VIAL 2006S

## (undated) RX ORDER — FENTANYL CITRATE 50 UG/ML
INJECTION, SOLUTION INTRAMUSCULAR; INTRAVENOUS
Status: DISPENSED
Start: 2022-03-16

## (undated) RX ORDER — CEFAZOLIN SODIUM/WATER 2 G/20 ML
SYRINGE (ML) INTRAVENOUS
Status: DISPENSED
Start: 2022-03-16

## (undated) RX ORDER — LIDOCAINE HYDROCHLORIDE 20 MG/ML
INJECTION, SOLUTION EPIDURAL; INFILTRATION; INTRACAUDAL; PERINEURAL
Status: DISPENSED
Start: 2022-03-16

## (undated) RX ORDER — FENTANYL CITRATE-0.9 % NACL/PF 10 MCG/ML
PLASTIC BAG, INJECTION (ML) INTRAVENOUS
Status: DISPENSED
Start: 2022-03-16

## (undated) RX ORDER — ACETAMINOPHEN 325 MG/1
TABLET ORAL
Status: DISPENSED
Start: 2022-03-16

## (undated) RX ORDER — CELECOXIB 200 MG/1
CAPSULE ORAL
Status: DISPENSED
Start: 2022-03-16

## (undated) RX ORDER — PROPOFOL 10 MG/ML
INJECTION, EMULSION INTRAVENOUS
Status: DISPENSED
Start: 2022-03-16

## (undated) RX ORDER — DEXAMETHASONE SODIUM PHOSPHATE 4 MG/ML
INJECTION, SOLUTION INTRA-ARTICULAR; INTRALESIONAL; INTRAMUSCULAR; INTRAVENOUS; SOFT TISSUE
Status: DISPENSED
Start: 2022-03-16

## (undated) RX ORDER — PROPOFOL 10 MG/ML
INJECTION, EMULSION INTRAVENOUS
Status: DISPENSED
Start: 2020-10-05

## (undated) RX ORDER — GABAPENTIN 300 MG/1
CAPSULE ORAL
Status: DISPENSED
Start: 2022-03-16

## (undated) RX ORDER — TRANEXAMIC ACID 650 MG/1
TABLET ORAL
Status: DISPENSED
Start: 2022-03-16

## (undated) RX ORDER — LIDOCAINE HYDROCHLORIDE 10 MG/ML
INJECTION, SOLUTION EPIDURAL; INFILTRATION; INTRACAUDAL; PERINEURAL
Status: DISPENSED
Start: 2020-10-05

## (undated) RX ORDER — GLYCOPYRROLATE 0.2 MG/ML
INJECTION, SOLUTION INTRAMUSCULAR; INTRAVENOUS
Status: DISPENSED
Start: 2020-10-05